# Patient Record
Sex: FEMALE | Race: WHITE | Employment: UNEMPLOYED | URBAN - METROPOLITAN AREA
[De-identification: names, ages, dates, MRNs, and addresses within clinical notes are randomized per-mention and may not be internally consistent; named-entity substitution may affect disease eponyms.]

---

## 2018-06-19 PROBLEM — I50.43 CHF (CONGESTIVE HEART FAILURE), NYHA CLASS I, ACUTE ON CHRONIC, COMBINED (HCC): Status: ACTIVE | Noted: 2018-06-19

## 2018-06-20 PROBLEM — I50.33 ACUTE ON CHRONIC DIASTOLIC CONGESTIVE HEART FAILURE (HCC): Status: ACTIVE | Noted: 2018-06-19

## 2018-08-29 ENCOUNTER — HOSPITAL ENCOUNTER (OUTPATIENT)
Age: 83
Discharge: HOME | End: 2018-08-29
Payer: MEDICARE

## 2018-08-29 LAB
BACTERIA: ABNORMAL /HPF
BILIRUBIN URINE: NEGATIVE
BLOOD, URINE: NEGATIVE
CLARITY: CLEAR
COLOR: YELLOW
EPITHELIAL CELLS, UA: ABNORMAL /HPF
GLUCOSE URINE: NEGATIVE MG/DL
KETONES, URINE: NEGATIVE MG/DL
LEUKOCYTE ESTERASE, URINE: NEGATIVE
MICROSCOPIC EXAMINATION: YES
NITRITE, URINE: POSITIVE
PH UA: 5.5
PROTEIN UA: NEGATIVE MG/DL
RBC UA: ABNORMAL /HPF (ref 0–2)
SPECIFIC GRAVITY UA: <=1.005
URINE TYPE: ABNORMAL
UROBILINOGEN, URINE: 0.2 E.U./DL
WBC UA: ABNORMAL /HPF (ref 0–5)

## 2018-08-29 PROCEDURE — 87186 SC STD MICRODIL/AGAR DIL: CPT

## 2018-08-29 PROCEDURE — 87077 CULTURE AEROBIC IDENTIFY: CPT

## 2018-08-29 PROCEDURE — 81001 URINALYSIS AUTO W/SCOPE: CPT

## 2018-08-29 PROCEDURE — 87086 URINE CULTURE/COLONY COUNT: CPT

## 2018-08-31 LAB
ORGANISM: ABNORMAL
URINE CULTURE, ROUTINE: ABNORMAL
URINE CULTURE, ROUTINE: ABNORMAL

## 2018-10-03 ENCOUNTER — HOSPITAL ENCOUNTER (OUTPATIENT)
Age: 83
Setting detail: SPECIMEN
Discharge: HOME | End: 2018-10-03
Payer: MEDICARE

## 2018-10-03 LAB
BILIRUBIN URINE: NEGATIVE
BLOOD, URINE: NEGATIVE
CLARITY: ABNORMAL
COLOR: YELLOW
GLUCOSE URINE: NEGATIVE MG/DL
KETONES, URINE: NEGATIVE MG/DL
LEUKOCYTE ESTERASE, URINE: NEGATIVE
MICROSCOPIC EXAMINATION: ABNORMAL
NITRITE, URINE: NEGATIVE
PH UA: 6.5
PROTEIN UA: NEGATIVE MG/DL
SPECIFIC GRAVITY UA: 1.01
URINE REFLEX TO CULTURE: ABNORMAL
URINE TYPE: ABNORMAL
UROBILINOGEN, URINE: 0.2 E.U./DL

## 2018-10-03 PROCEDURE — 81003 URINALYSIS AUTO W/O SCOPE: CPT

## 2018-12-06 ENCOUNTER — APPOINTMENT (OUTPATIENT)
Dept: GENERAL RADIOLOGY | Age: 83
End: 2018-12-06
Payer: MEDICARE

## 2018-12-06 ENCOUNTER — HOSPITAL ENCOUNTER (EMERGENCY)
Age: 83
Discharge: HOME | End: 2018-12-06
Attending: EMERGENCY MEDICINE
Payer: MEDICARE

## 2018-12-06 VITALS
OXYGEN SATURATION: 94 % | DIASTOLIC BLOOD PRESSURE: 65 MMHG | SYSTOLIC BLOOD PRESSURE: 148 MMHG | HEART RATE: 75 BPM | RESPIRATION RATE: 16 BRPM | TEMPERATURE: 98 F

## 2018-12-06 DIAGNOSIS — S22.060A CLOSED WEDGE COMPRESSION FRACTURE OF EIGHTH THORACIC VERTEBRA, INITIAL ENCOUNTER: ICD-10-CM

## 2018-12-06 DIAGNOSIS — M54.6 ACUTE BILATERAL THORACIC BACK PAIN: Primary | ICD-10-CM

## 2018-12-06 LAB
A/G RATIO: 1.3 (ref 1.1–2.2)
ALBUMIN SERPL-MCNC: 3.8 G/DL (ref 3.4–5)
ALP BLD-CCNC: 77 U/L (ref 40–129)
ALT SERPL-CCNC: 8 U/L (ref 10–40)
ANION GAP SERPL CALCULATED.3IONS-SCNC: 8 MMOL/L (ref 3–16)
AST SERPL-CCNC: 14 U/L (ref 15–37)
BILIRUB SERPL-MCNC: 0.5 MG/DL (ref 0–1)
BILIRUBIN URINE: NEGATIVE
BLOOD, URINE: NEGATIVE
BUN BLDV-MCNC: 21 MG/DL (ref 7–20)
CALCIUM SERPL-MCNC: 9.1 MG/DL (ref 8.3–10.6)
CHLORIDE BLD-SCNC: 91 MMOL/L (ref 99–110)
CLARITY: CLEAR
CO2: 35 MMOL/L (ref 21–32)
COLOR: YELLOW
CREAT SERPL-MCNC: 1.3 MG/DL (ref 0.6–1.2)
GFR AFRICAN AMERICAN: 46
GFR NON-AFRICAN AMERICAN: 38
GLOBULIN: 3 G/DL
GLUCOSE BLD-MCNC: 215 MG/DL (ref 70–99)
GLUCOSE URINE: NEGATIVE MG/DL
HCT VFR BLD CALC: 38.8 % (ref 36–48)
HEMOGLOBIN: 12.8 G/DL (ref 12–16)
KETONES, URINE: NEGATIVE MG/DL
LEUKOCYTE ESTERASE, URINE: NEGATIVE
LIPASE: 15 U/L (ref 13–60)
MCH RBC QN AUTO: 31.9 PG (ref 26–34)
MCHC RBC AUTO-ENTMCNC: 33.1 G/DL (ref 31–36)
MCV RBC AUTO: 96.4 FL (ref 80–100)
MICROSCOPIC EXAMINATION: NORMAL
NITRITE, URINE: NEGATIVE
PDW BLD-RTO: 14.3 % (ref 12.4–15.4)
PH UA: 7.5
PLATELET # BLD: 213 K/UL (ref 135–450)
PMV BLD AUTO: 7.3 FL (ref 5–10.5)
POTASSIUM SERPL-SCNC: 5.1 MMOL/L (ref 3.5–5.1)
PROTEIN UA: NEGATIVE MG/DL
RBC # BLD: 4.02 M/UL (ref 4–5.2)
SODIUM BLD-SCNC: 134 MMOL/L (ref 136–145)
SPECIFIC GRAVITY UA: 1.01
TOTAL PROTEIN: 6.8 G/DL (ref 6.4–8.2)
URINE TYPE: NORMAL
UROBILINOGEN, URINE: 0.2 E.U./DL
WBC # BLD: 7.9 K/UL (ref 4–11)

## 2018-12-06 PROCEDURE — 99283 EMERGENCY DEPT VISIT LOW MDM: CPT

## 2018-12-06 PROCEDURE — 72074 X-RAY EXAM THORAC SPINE4/>VW: CPT

## 2018-12-06 PROCEDURE — 80053 COMPREHEN METABOLIC PANEL: CPT

## 2018-12-06 PROCEDURE — 6370000000 HC RX 637 (ALT 250 FOR IP): Performed by: EMERGENCY MEDICINE

## 2018-12-06 PROCEDURE — 85027 COMPLETE CBC AUTOMATED: CPT

## 2018-12-06 PROCEDURE — 81003 URINALYSIS AUTO W/O SCOPE: CPT

## 2018-12-06 PROCEDURE — 72100 X-RAY EXAM L-S SPINE 2/3 VWS: CPT

## 2018-12-06 PROCEDURE — 72072 X-RAY EXAM THORAC SPINE 3VWS: CPT

## 2018-12-06 PROCEDURE — 83690 ASSAY OF LIPASE: CPT

## 2018-12-06 PROCEDURE — 71046 X-RAY EXAM CHEST 2 VIEWS: CPT

## 2018-12-06 RX ORDER — LIDOCAINE 4 G/G
1 PATCH TOPICAL ONCE
Status: DISCONTINUED | OUTPATIENT
Start: 2018-12-06 | End: 2018-12-06 | Stop reason: HOSPADM

## 2018-12-06 RX ORDER — LIDOCAINE 50 MG/G
2 PATCH TOPICAL DAILY
Qty: 30 PATCH | Refills: 0 | Status: SHIPPED | OUTPATIENT
Start: 2018-12-06

## 2018-12-06 RX ORDER — LIDOCAINE 50 MG/G
1 PATCH TOPICAL DAILY
Status: DISCONTINUED | OUTPATIENT
Start: 2018-12-06 | End: 2018-12-06 | Stop reason: CLARIF

## 2018-12-06 ASSESSMENT — PAIN SCALES - GENERAL: PAINLEVEL_OUTOF10: 10

## 2018-12-06 ASSESSMENT — PAIN DESCRIPTION - LOCATION: LOCATION: BACK;RIB CAGE

## 2018-12-06 ASSESSMENT — PAIN DESCRIPTION - PAIN TYPE: TYPE: ACUTE PAIN

## 2018-12-06 NOTE — ED NOTES
Medication reconciliation incomplete due to patient/family's inability to provide complete list.  Will need follow up.        Ross Daily, ENRIQUETA  12/06/18 6701

## 2018-12-07 ENCOUNTER — TELEPHONE (OUTPATIENT)
Dept: ORTHOPEDIC SURGERY | Age: 83
End: 2018-12-07

## 2018-12-07 NOTE — ED PROVIDER NOTES
CHIEF COMPLAINT  Back Pain (c/o back pain \"from waist up. \" and rib pain x 1 week. denies injury/trauma or falling or coughing. \"in my spine\")      HISTORY OF PRESENT ILLNESS  Ronan Vallejo is a 80 y.o. female with a history of diabetes mellitus, hypertension, and TIAs who presents to the ED complaining of back pain. Patient reports that she bent over to  a laundry basket within the last week and since that time his had severe lower back pain. Patient denies any radicular symptoms, saddle anesthesia, urinary incontinence. No blunt trauma noted. She further denies any abdominal pain. .   No other complaints, modifying factors or associated symptoms. I have reviewed the following from the nursing documentation. Past Medical History:   Diagnosis Date    Diabetes mellitus ([facility] [address]     Hypertension     TIA (transient ischemic attack)      Past Surgical History:   Procedure Laterality Date    CARDIAC SURGERY      CHOLECYSTECTOMY      CORONARY ARTERY BYPASS GRAFT      2 way     SPINE SURGERY      THYROIDECTOMY       History reviewed. No pertinent family history. Social History     Social History    Marital status:      Spouse name: N/A    Number of children: N/A    Years of education: N/A     Occupational History    Not on file. Social History Main Topics    Smoking status: Former Smoker    Smokeless tobacco: Not on file    Alcohol use No    Drug use: No    Sexual activity: Not on file     Other Topics Concern    Not on file     Social History Narrative    No narrative on file     No current facility-administered medications for this encounter. Current Outpatient Prescriptions   Medication Sig Dispense Refill    lidocaine (LIDODERM) 5 % Place 2 patches onto the skin daily 12 hours on, 12 hours off. 30 patch 0    gabapentin (NEURONTIN) 100 MG capsule Take 1 capsule by mouth 2 times daily for 30 days. . 60 capsule 0    lisinopril (PRINIVIL;ZESTRIL) 5 MG tablet Take 1

## 2018-12-11 ENCOUNTER — OFFICE VISIT (OUTPATIENT)
Dept: ORTHOPEDIC SURGERY | Age: 83
End: 2018-12-11
Payer: MEDICARE

## 2018-12-11 VITALS
DIASTOLIC BLOOD PRESSURE: 80 MMHG | SYSTOLIC BLOOD PRESSURE: 133 MMHG | HEART RATE: 78 BPM | WEIGHT: 152.78 LBS | BODY MASS INDEX: 28.84 KG/M2 | HEIGHT: 61 IN

## 2018-12-11 DIAGNOSIS — S22.000A CLOSED COMPRESSION FRACTURE OF THORACIC VERTEBRA, INITIAL ENCOUNTER (HCC): Primary | ICD-10-CM

## 2018-12-11 PROCEDURE — 1090F PRES/ABSN URINE INCON ASSESS: CPT | Performed by: PHYSICIAN ASSISTANT

## 2018-12-11 PROCEDURE — G8419 CALC BMI OUT NRM PARAM NOF/U: HCPCS | Performed by: PHYSICIAN ASSISTANT

## 2018-12-11 PROCEDURE — 99203 OFFICE O/P NEW LOW 30 MIN: CPT | Performed by: PHYSICIAN ASSISTANT

## 2018-12-11 PROCEDURE — G8427 DOCREV CUR MEDS BY ELIG CLIN: HCPCS | Performed by: PHYSICIAN ASSISTANT

## 2018-12-11 PROCEDURE — 1101F PT FALLS ASSESS-DOCD LE1/YR: CPT | Performed by: PHYSICIAN ASSISTANT

## 2018-12-11 PROCEDURE — G8484 FLU IMMUNIZE NO ADMIN: HCPCS | Performed by: PHYSICIAN ASSISTANT

## 2018-12-11 NOTE — PROGRESS NOTES
History of present illness:   Ms. Hunter Wei  is a pleasant 80 y.o. female with a PMH of TIA, HTN, and diabetes kindly referred by Dr. Ori Dang from [facility] ED for consultation regarding her thoracic back pain. She states her pain began insidiously about 1.5 weeks ago after doing laundry. Her pain has steadily persisted since then. She rates her back pain 10/10 and leg pain 0/10. She has a history of low back and intermittent right leg pain from lumbar stenosis, but notes her new pain is in her mid to lower thoracic spine and radiates into her lower rib cage. She describes the pain as sharp. She denies new lower extremity symptoms or bowel or bladder dysfunction. She states she can sit as long as she likes and stand for a maximum of 10 minutes. The pain moderately disrupts her sleep. She takes percocet. Past medical history:  Her past medical history has been reviewed and is significant for TIA, HTN, and diabetes. Her past surgical history has been reviewed and is non-contributory to her present illness. Her medications and allergies were reviewed. Her social history has been reviewed and she is a former smoker. Her family history has been reviewed is not pertinent to her current illness. Review of symptoms:  Patient's review of symptoms was reviewed and is significant for back pain and negative for recent weight loss, fatigue, chills, visual disturbances, blood in stool or urine, recent infection, chest pain, or shortness of breath. All other ROS were negative. Physical examination:  Ms. Deann Angulo's most recent vitals:  Vitals  BP: 133/80  Pulse: 78  Height: 5' 1.5\" (156.2 cm)  Weight: 152 lb 12.5 oz (69.3 kg)  Body mass index is 28.4 kg/m². General exam:  She is well-developed and well-nourished, is in obvious pain and alert and oriented to person, place, and time. She demonstrates appropriate mood and affect. Her skin is warm and dry.  Her gait is normal and she uses a
scar. She has 5/5 strength of EHLs, FHLs, foot evertors, ankle dorsiflexors, plantarflexors, quadriceps, hamstrings, iliopsoas, abductors and adductors about the hips bilaterally. She has a negative straight leg raise, bilaterally. Achilles and quadriceps reflexes are 1+. Sensation is intact to light touch L3 to S1 bilaterally. She has no clonus. Hip range of motion painless. Imaging:  I reviewed MRI images of her lumbar spine ***    Assessment:  ***    Plan:  We discussed treatment options including observation, additional oral steroids, physical therapy, epidural injection and microlumbar discectomy. She wishes to proceed with microdiscectomy. We discussed the risks, benefits, and alternatives to surgery including the risks of nerve or vessel injury, paralysis, spinal blood clot, spinal fluid leak, death, infection, need for additional surgery for recurrent herniation or low back pain, failure of surgery to alleviate her symptoms and worse symptoms following surgery. She understands these risks and wishes to proceed with surgery. Her questions were answered. She was instructed to call us emergently if she begins to experience bowel or bladder dysfunction, saddle anesthesia, increasing muscle weakness, or *** leg symptoms.

## 2018-12-28 ENCOUNTER — HOSPITAL ENCOUNTER (OUTPATIENT)
Age: 83
Setting detail: SPECIMEN
Discharge: HOME | End: 2018-12-28
Payer: MEDICARE

## 2018-12-28 LAB
AMORPHOUS: ABNORMAL /HPF
BACTERIA: ABNORMAL /HPF
BILIRUBIN URINE: NEGATIVE
BLOOD, URINE: NEGATIVE
CLARITY: ABNORMAL
COLOR: YELLOW
EPITHELIAL CELLS, UA: ABNORMAL /HPF
GLUCOSE URINE: NEGATIVE MG/DL
KETONES, URINE: NEGATIVE MG/DL
LEUKOCYTE ESTERASE, URINE: NEGATIVE
MICROSCOPIC EXAMINATION: YES
NITRITE, URINE: POSITIVE
PH UA: 8.5
PROTEIN UA: NEGATIVE MG/DL
RBC UA: ABNORMAL /HPF (ref 0–2)
SPECIFIC GRAVITY UA: 1.01
URINE TYPE: ABNORMAL
UROBILINOGEN, URINE: 0.2 E.U./DL
WBC UA: ABNORMAL /HPF (ref 0–5)

## 2018-12-28 PROCEDURE — 87186 SC STD MICRODIL/AGAR DIL: CPT

## 2018-12-28 PROCEDURE — 87077 CULTURE AEROBIC IDENTIFY: CPT

## 2018-12-28 PROCEDURE — 87086 URINE CULTURE/COLONY COUNT: CPT

## 2018-12-28 PROCEDURE — 81001 URINALYSIS AUTO W/SCOPE: CPT

## 2018-12-30 LAB
ORGANISM: ABNORMAL
URINE CULTURE, ROUTINE: ABNORMAL
URINE CULTURE, ROUTINE: ABNORMAL

## 2019-01-04 ENCOUNTER — APPOINTMENT (OUTPATIENT)
Dept: GENERAL RADIOLOGY | Age: 84
DRG: 291 | End: 2019-01-04
Payer: MEDICARE

## 2019-01-04 ENCOUNTER — HOSPITAL ENCOUNTER (INPATIENT)
Age: 84
LOS: 5 days | Discharge: NURSING, REHAB OR LONG-TERM CARE | DRG: 291 | End: 2019-01-09
Attending: EMERGENCY MEDICINE | Admitting: INTERNAL MEDICINE
Payer: MEDICARE

## 2019-01-04 DIAGNOSIS — J18.9 PNEUMONIA DUE TO ORGANISM: ICD-10-CM

## 2019-01-04 DIAGNOSIS — I50.9 ACUTE ON CHRONIC CONGESTIVE HEART FAILURE, UNSPECIFIED HEART FAILURE TYPE (HCC): Primary | ICD-10-CM

## 2019-01-04 DIAGNOSIS — G89.29 OTHER CHRONIC PAIN: ICD-10-CM

## 2019-01-04 PROBLEM — I10 BENIGN ESSENTIAL HTN: Status: ACTIVE | Noted: 2019-01-04

## 2019-01-04 PROBLEM — E11.9 DIABETES MELLITUS (HCC): Status: ACTIVE | Noted: 2019-01-04

## 2019-01-04 PROBLEM — I50.33 ACUTE ON CHRONIC DIASTOLIC CHF (CONGESTIVE HEART FAILURE) (HCC): Status: ACTIVE | Noted: 2019-01-04

## 2019-01-04 LAB
A/G RATIO: 1.1 (ref 1.1–2.2)
ALBUMIN SERPL-MCNC: 3.5 G/DL (ref 3.4–5)
ALP BLD-CCNC: 98 U/L (ref 40–129)
ALT SERPL-CCNC: 9 U/L (ref 10–40)
ANION GAP SERPL CALCULATED.3IONS-SCNC: 7 MMOL/L (ref 3–16)
AST SERPL-CCNC: 24 U/L (ref 15–37)
BASOPHILS ABSOLUTE: 0 K/UL (ref 0–0.2)
BASOPHILS RELATIVE PERCENT: 0.5 %
BILIRUB SERPL-MCNC: 0.3 MG/DL (ref 0–1)
BILIRUBIN URINE: NEGATIVE
BLOOD, URINE: NEGATIVE
BUN BLDV-MCNC: 19 MG/DL (ref 7–20)
CALCIUM SERPL-MCNC: 9.1 MG/DL (ref 8.3–10.6)
CHLORIDE BLD-SCNC: 91 MMOL/L (ref 99–110)
CLARITY: CLEAR
CO2: 35 MMOL/L (ref 21–32)
COLOR: YELLOW
CREAT SERPL-MCNC: 1.4 MG/DL (ref 0.6–1.2)
EKG ATRIAL RATE: 87 BPM
EKG DIAGNOSIS: NORMAL
EKG P AXIS: 40 DEGREES
EKG P-R INTERVAL: 242 MS
EKG Q-T INTERVAL: 416 MS
EKG QRS DURATION: 92 MS
EKG QTC CALCULATION (BAZETT): 500 MS
EKG R AXIS: 82 DEGREES
EKG T AXIS: 30 DEGREES
EKG VENTRICULAR RATE: 87 BPM
EOSINOPHILS ABSOLUTE: 0 K/UL (ref 0–0.6)
EOSINOPHILS RELATIVE PERCENT: 0.3 %
GFR AFRICAN AMERICAN: 43
GFR NON-AFRICAN AMERICAN: 35
GLOBULIN: 3.2 G/DL
GLUCOSE BLD-MCNC: 113 MG/DL (ref 70–99)
GLUCOSE BLD-MCNC: 170 MG/DL (ref 70–99)
GLUCOSE URINE: NEGATIVE MG/DL
HCT VFR BLD CALC: 37.9 % (ref 36–48)
HEMOGLOBIN: 12.7 G/DL (ref 12–16)
KETONES, URINE: NEGATIVE MG/DL
LEUKOCYTE ESTERASE, URINE: NEGATIVE
LYMPHOCYTES ABSOLUTE: 0.3 K/UL (ref 1–5.1)
LYMPHOCYTES RELATIVE PERCENT: 5.6 %
MCH RBC QN AUTO: 33 PG (ref 26–34)
MCHC RBC AUTO-ENTMCNC: 33.5 G/DL (ref 31–36)
MCV RBC AUTO: 98.3 FL (ref 80–100)
MICROSCOPIC EXAMINATION: ABNORMAL
MONOCYTES ABSOLUTE: 0.3 K/UL (ref 0–1.3)
MONOCYTES RELATIVE PERCENT: 6.3 %
NEUTROPHILS ABSOLUTE: 4.4 K/UL (ref 1.7–7.7)
NEUTROPHILS RELATIVE PERCENT: 87.3 %
NITRITE, URINE: NEGATIVE
PDW BLD-RTO: 14.9 % (ref 12.4–15.4)
PERFORMED ON: ABNORMAL
PH UA: 8.5
PLATELET # BLD: 225 K/UL (ref 135–450)
PMV BLD AUTO: 7.2 FL (ref 5–10.5)
POTASSIUM SERPL-SCNC: 4.9 MMOL/L (ref 3.5–5.1)
PRO-BNP: 2082 PG/ML (ref 0–449)
PROTEIN UA: NEGATIVE MG/DL
RBC # BLD: 3.86 M/UL (ref 4–5.2)
SODIUM BLD-SCNC: 133 MMOL/L (ref 136–145)
SPECIFIC GRAVITY UA: 1.01
TOTAL PROTEIN: 6.7 G/DL (ref 6.4–8.2)
TROPONIN: <0.01 NG/ML
TROPONIN: <0.01 NG/ML
URINE TYPE: ABNORMAL
UROBILINOGEN, URINE: 0.2 E.U./DL
WBC # BLD: 5 K/UL (ref 4–11)

## 2019-01-04 PROCEDURE — 2580000003 HC RX 258: Performed by: INTERNAL MEDICINE

## 2019-01-04 PROCEDURE — 83880 ASSAY OF NATRIURETIC PEPTIDE: CPT

## 2019-01-04 PROCEDURE — 80053 COMPREHEN METABOLIC PANEL: CPT

## 2019-01-04 PROCEDURE — 6360000002 HC RX W HCPCS: Performed by: EMERGENCY MEDICINE

## 2019-01-04 PROCEDURE — 93005 ELECTROCARDIOGRAM TRACING: CPT | Performed by: EMERGENCY MEDICINE

## 2019-01-04 PROCEDURE — 99285 EMERGENCY DEPT VISIT HI MDM: CPT

## 2019-01-04 PROCEDURE — 6370000000 HC RX 637 (ALT 250 FOR IP): Performed by: NURSE PRACTITIONER

## 2019-01-04 PROCEDURE — 84484 ASSAY OF TROPONIN QUANT: CPT

## 2019-01-04 PROCEDURE — 85025 COMPLETE CBC W/AUTO DIFF WBC: CPT

## 2019-01-04 PROCEDURE — 6360000002 HC RX W HCPCS: Performed by: INTERNAL MEDICINE

## 2019-01-04 PROCEDURE — 93010 ELECTROCARDIOGRAM REPORT: CPT | Performed by: INTERNAL MEDICINE

## 2019-01-04 PROCEDURE — 71046 X-RAY EXAM CHEST 2 VIEWS: CPT

## 2019-01-04 PROCEDURE — 81003 URINALYSIS AUTO W/O SCOPE: CPT

## 2019-01-04 PROCEDURE — 1200000000 HC SEMI PRIVATE

## 2019-01-04 PROCEDURE — 2580000003 HC RX 258: Performed by: EMERGENCY MEDICINE

## 2019-01-04 PROCEDURE — 6370000000 HC RX 637 (ALT 250 FOR IP): Performed by: INTERNAL MEDICINE

## 2019-01-04 PROCEDURE — 2700000000 HC OXYGEN THERAPY PER DAY

## 2019-01-04 PROCEDURE — 36415 COLL VENOUS BLD VENIPUNCTURE: CPT

## 2019-01-04 RX ORDER — OXYCODONE HYDROCHLORIDE AND ACETAMINOPHEN 5; 325 MG/1; MG/1
2 TABLET ORAL EVERY 4 HOURS PRN
Status: ON HOLD | COMMUNITY
End: 2019-01-09

## 2019-01-04 RX ORDER — ASPIRIN 325 MG
325 TABLET ORAL DAILY
Status: DISCONTINUED | OUTPATIENT
Start: 2019-01-05 | End: 2019-01-09 | Stop reason: HOSPADM

## 2019-01-04 RX ORDER — FUROSEMIDE 10 MG/ML
40 INJECTION INTRAMUSCULAR; INTRAVENOUS 2 TIMES DAILY
Status: DISCONTINUED | OUTPATIENT
Start: 2019-01-04 | End: 2019-01-06

## 2019-01-04 RX ORDER — ONDANSETRON 2 MG/ML
4 INJECTION INTRAMUSCULAR; INTRAVENOUS EVERY 6 HOURS PRN
Status: DISCONTINUED | OUTPATIENT
Start: 2019-01-04 | End: 2019-01-04

## 2019-01-04 RX ORDER — NICOTINE POLACRILEX 4 MG
15 LOZENGE BUCCAL PRN
Status: DISCONTINUED | OUTPATIENT
Start: 2019-01-04 | End: 2019-01-09 | Stop reason: HOSPADM

## 2019-01-04 RX ORDER — LEVOTHYROXINE SODIUM 0.1 MG/1
100 TABLET ORAL DAILY
Status: DISCONTINUED | OUTPATIENT
Start: 2019-01-04 | End: 2019-01-09 | Stop reason: HOSPADM

## 2019-01-04 RX ORDER — LISINOPRIL 5 MG/1
5 TABLET ORAL DAILY
Status: DISCONTINUED | OUTPATIENT
Start: 2019-01-05 | End: 2019-01-06

## 2019-01-04 RX ORDER — OXYCODONE HYDROCHLORIDE AND ACETAMINOPHEN 5; 325 MG/1; MG/1
2 TABLET ORAL EVERY 4 HOURS PRN
Status: DISCONTINUED | OUTPATIENT
Start: 2019-01-04 | End: 2019-01-09

## 2019-01-04 RX ORDER — DEXTROSE MONOHYDRATE 25 G/50ML
12.5 INJECTION, SOLUTION INTRAVENOUS PRN
Status: DISCONTINUED | OUTPATIENT
Start: 2019-01-04 | End: 2019-01-09 | Stop reason: HOSPADM

## 2019-01-04 RX ORDER — CITALOPRAM 20 MG/1
20 TABLET ORAL DAILY
Status: DISCONTINUED | OUTPATIENT
Start: 2019-01-05 | End: 2019-01-09 | Stop reason: HOSPADM

## 2019-01-04 RX ORDER — LIDOCAINE 4 G/G
1 PATCH TOPICAL DAILY
Status: DISCONTINUED | OUTPATIENT
Start: 2019-01-05 | End: 2019-01-09 | Stop reason: HOSPADM

## 2019-01-04 RX ORDER — SODIUM CHLORIDE 0.9 % (FLUSH) 0.9 %
10 SYRINGE (ML) INJECTION EVERY 12 HOURS SCHEDULED
Status: DISCONTINUED | OUTPATIENT
Start: 2019-01-04 | End: 2019-01-09 | Stop reason: HOSPADM

## 2019-01-04 RX ORDER — INSULIN GLARGINE 100 [IU]/ML
12 INJECTION, SOLUTION SUBCUTANEOUS NIGHTLY
Status: DISCONTINUED | OUTPATIENT
Start: 2019-01-04 | End: 2019-01-08

## 2019-01-04 RX ORDER — SODIUM CHLORIDE 9 MG/ML
INJECTION, SOLUTION INTRAVENOUS
Status: DISPENSED
Start: 2019-01-04 | End: 2019-01-05

## 2019-01-04 RX ORDER — SODIUM CHLORIDE 0.9 % (FLUSH) 0.9 %
10 SYRINGE (ML) INJECTION PRN
Status: DISCONTINUED | OUTPATIENT
Start: 2019-01-04 | End: 2019-01-09 | Stop reason: HOSPADM

## 2019-01-04 RX ORDER — GABAPENTIN 100 MG/1
100 CAPSULE ORAL 2 TIMES DAILY
Status: DISCONTINUED | OUTPATIENT
Start: 2019-01-04 | End: 2019-01-09 | Stop reason: HOSPADM

## 2019-01-04 RX ORDER — ATORVASTATIN CALCIUM 80 MG/1
80 TABLET, FILM COATED ORAL NIGHTLY
Status: DISCONTINUED | OUTPATIENT
Start: 2019-01-04 | End: 2019-01-09 | Stop reason: HOSPADM

## 2019-01-04 RX ORDER — TRAZODONE HYDROCHLORIDE 50 MG/1
150 TABLET ORAL NIGHTLY
Status: DISCONTINUED | OUTPATIENT
Start: 2019-01-04 | End: 2019-01-09 | Stop reason: HOSPADM

## 2019-01-04 RX ORDER — OXAZEPAM 15 MG/1
15 CAPSULE ORAL 3 TIMES DAILY PRN
Status: DISCONTINUED | OUTPATIENT
Start: 2019-01-04 | End: 2019-01-09 | Stop reason: HOSPADM

## 2019-01-04 RX ORDER — DEXTROSE MONOHYDRATE 50 MG/ML
100 INJECTION, SOLUTION INTRAVENOUS PRN
Status: DISCONTINUED | OUTPATIENT
Start: 2019-01-04 | End: 2019-01-09 | Stop reason: HOSPADM

## 2019-01-04 RX ORDER — OXAZEPAM 15 MG/1
CAPSULE ORAL
COMMUNITY
Start: 2019-01-03 | End: 2019-04-03

## 2019-01-04 RX ADMIN — FUROSEMIDE 40 MG: 10 INJECTION, SOLUTION INTRAMUSCULAR; INTRAVENOUS at 23:09

## 2019-01-04 RX ADMIN — TRAZODONE HYDROCHLORIDE 150 MG: 50 TABLET ORAL at 23:14

## 2019-01-04 RX ADMIN — INSULIN GLARGINE 12 UNITS: 100 INJECTION, SOLUTION SUBCUTANEOUS at 23:15

## 2019-01-04 RX ADMIN — OXYCODONE AND ACETAMINOPHEN 2 TABLET: 5; 325 TABLET ORAL at 23:13

## 2019-01-04 RX ADMIN — OXAZEPAM 15 MG: 15 CAPSULE ORAL at 23:12

## 2019-01-04 RX ADMIN — GABAPENTIN 100 MG: 100 CAPSULE ORAL at 23:13

## 2019-01-04 RX ADMIN — SODIUM CHLORIDE, PRESERVATIVE FREE 10 ML: 5 INJECTION INTRAVENOUS at 22:36

## 2019-01-04 RX ADMIN — ATORVASTATIN CALCIUM 80 MG: 80 TABLET, FILM COATED ORAL at 23:14

## 2019-01-04 RX ADMIN — CEFTRIAXONE SODIUM 1 G: 1 INJECTION, POWDER, FOR SOLUTION INTRAMUSCULAR; INTRAVENOUS at 22:36

## 2019-01-04 ASSESSMENT — PAIN DESCRIPTION - PAIN TYPE: TYPE: CHRONIC PAIN

## 2019-01-04 ASSESSMENT — ENCOUNTER SYMPTOMS
TROUBLE SWALLOWING: 0
COUGH: 1
SORE THROAT: 0
SHORTNESS OF BREATH: 1
RHINORRHEA: 0
DIARRHEA: 0
WHEEZING: 0
VOMITING: 0
CHEST TIGHTNESS: 0
ABDOMINAL PAIN: 0
STRIDOR: 0

## 2019-01-04 ASSESSMENT — PAIN DESCRIPTION - LOCATION: LOCATION: BACK;SHOULDER

## 2019-01-04 ASSESSMENT — PAIN SCALES - GENERAL: PAINLEVEL_OUTOF10: 9

## 2019-01-05 LAB
ANION GAP SERPL CALCULATED.3IONS-SCNC: 7 MMOL/L (ref 3–16)
BUN BLDV-MCNC: 20 MG/DL (ref 7–20)
CALCIUM SERPL-MCNC: 9 MG/DL (ref 8.3–10.6)
CHLORIDE BLD-SCNC: 93 MMOL/L (ref 99–110)
CHOLESTEROL, TOTAL: 120 MG/DL (ref 0–199)
CO2: 34 MMOL/L (ref 21–32)
CREAT SERPL-MCNC: 1.4 MG/DL (ref 0.6–1.2)
GFR AFRICAN AMERICAN: 43
GFR NON-AFRICAN AMERICAN: 35
GLUCOSE BLD-MCNC: 105 MG/DL (ref 70–99)
GLUCOSE BLD-MCNC: 153 MG/DL (ref 70–99)
GLUCOSE BLD-MCNC: 186 MG/DL (ref 70–99)
GLUCOSE BLD-MCNC: 226 MG/DL (ref 70–99)
GLUCOSE BLD-MCNC: 230 MG/DL (ref 70–99)
HDLC SERPL-MCNC: 52 MG/DL (ref 40–60)
LDL CHOLESTEROL CALCULATED: 54 MG/DL
MAGNESIUM: 2.5 MG/DL (ref 1.8–2.4)
PERFORMED ON: ABNORMAL
POTASSIUM SERPL-SCNC: 4.5 MMOL/L (ref 3.5–5.1)
SODIUM BLD-SCNC: 134 MMOL/L (ref 136–145)
TRIGL SERPL-MCNC: 68 MG/DL (ref 0–150)
TROPONIN: <0.01 NG/ML
VLDLC SERPL CALC-MCNC: 14 MG/DL

## 2019-01-05 PROCEDURE — 84484 ASSAY OF TROPONIN QUANT: CPT

## 2019-01-05 PROCEDURE — 94761 N-INVAS EAR/PLS OXIMETRY MLT: CPT

## 2019-01-05 PROCEDURE — 6370000000 HC RX 637 (ALT 250 FOR IP): Performed by: INTERNAL MEDICINE

## 2019-01-05 PROCEDURE — 6370000000 HC RX 637 (ALT 250 FOR IP): Performed by: NURSE PRACTITIONER

## 2019-01-05 PROCEDURE — 2580000003 HC RX 258: Performed by: INTERNAL MEDICINE

## 2019-01-05 PROCEDURE — 1200000000 HC SEMI PRIVATE

## 2019-01-05 PROCEDURE — 36415 COLL VENOUS BLD VENIPUNCTURE: CPT

## 2019-01-05 PROCEDURE — 83735 ASSAY OF MAGNESIUM: CPT

## 2019-01-05 PROCEDURE — 80061 LIPID PANEL: CPT

## 2019-01-05 PROCEDURE — 2700000000 HC OXYGEN THERAPY PER DAY

## 2019-01-05 PROCEDURE — 99223 1ST HOSP IP/OBS HIGH 75: CPT | Performed by: INTERNAL MEDICINE

## 2019-01-05 PROCEDURE — 80048 BASIC METABOLIC PNL TOTAL CA: CPT

## 2019-01-05 PROCEDURE — 6360000002 HC RX W HCPCS: Performed by: INTERNAL MEDICINE

## 2019-01-05 RX ORDER — METOLAZONE 2.5 MG/1
5 TABLET ORAL ONCE
Status: DISCONTINUED | OUTPATIENT
Start: 2019-01-05 | End: 2019-01-05

## 2019-01-05 RX ORDER — METOLAZONE 2.5 MG/1
5 TABLET ORAL ONCE
Status: COMPLETED | OUTPATIENT
Start: 2019-01-05 | End: 2019-01-05

## 2019-01-05 RX ADMIN — INSULIN GLARGINE 12 UNITS: 100 INJECTION, SOLUTION SUBCUTANEOUS at 22:01

## 2019-01-05 RX ADMIN — OXYCODONE AND ACETAMINOPHEN 2 TABLET: 5; 325 TABLET ORAL at 09:47

## 2019-01-05 RX ADMIN — TRAZODONE HYDROCHLORIDE 150 MG: 50 TABLET ORAL at 23:40

## 2019-01-05 RX ADMIN — OXYCODONE AND ACETAMINOPHEN 2 TABLET: 5; 325 TABLET ORAL at 14:20

## 2019-01-05 RX ADMIN — CITALOPRAM HYDROBROMIDE 20 MG: 20 TABLET ORAL at 07:59

## 2019-01-05 RX ADMIN — OXAZEPAM 15 MG: 15 CAPSULE ORAL at 17:11

## 2019-01-05 RX ADMIN — METOLAZONE 5 MG: 2.5 TABLET ORAL at 20:16

## 2019-01-05 RX ADMIN — SODIUM CHLORIDE, PRESERVATIVE FREE 10 ML: 5 INJECTION INTRAVENOUS at 08:03

## 2019-01-05 RX ADMIN — SODIUM CHLORIDE, PRESERVATIVE FREE 10 ML: 5 INJECTION INTRAVENOUS at 20:16

## 2019-01-05 RX ADMIN — LEVOTHYROXINE SODIUM 100 MCG: 100 TABLET ORAL at 07:59

## 2019-01-05 RX ADMIN — LISINOPRIL 5 MG: 5 TABLET ORAL at 07:59

## 2019-01-05 RX ADMIN — OXYCODONE AND ACETAMINOPHEN 2 TABLET: 5; 325 TABLET ORAL at 05:20

## 2019-01-05 RX ADMIN — GABAPENTIN 100 MG: 100 CAPSULE ORAL at 20:15

## 2019-01-05 RX ADMIN — FUROSEMIDE 40 MG: 10 INJECTION, SOLUTION INTRAMUSCULAR; INTRAVENOUS at 20:16

## 2019-01-05 RX ADMIN — OXYCODONE AND ACETAMINOPHEN 2 TABLET: 5; 325 TABLET ORAL at 20:15

## 2019-01-05 RX ADMIN — ASPIRIN 325 MG: 325 TABLET, COATED ORAL at 07:59

## 2019-01-05 RX ADMIN — ENOXAPARIN SODIUM 30 MG: 30 INJECTION SUBCUTANEOUS at 07:58

## 2019-01-05 RX ADMIN — OXAZEPAM 15 MG: 15 CAPSULE ORAL at 09:24

## 2019-01-05 RX ADMIN — ATORVASTATIN CALCIUM 80 MG: 80 TABLET, FILM COATED ORAL at 20:15

## 2019-01-05 RX ADMIN — FUROSEMIDE 40 MG: 10 INJECTION, SOLUTION INTRAMUSCULAR; INTRAVENOUS at 07:59

## 2019-01-05 RX ADMIN — INSULIN LISPRO 2 UNITS: 100 INJECTION, SOLUTION INTRAVENOUS; SUBCUTANEOUS at 22:01

## 2019-01-05 RX ADMIN — INSULIN LISPRO 2 UNITS: 100 INJECTION, SOLUTION INTRAVENOUS; SUBCUTANEOUS at 08:06

## 2019-01-05 RX ADMIN — GABAPENTIN 100 MG: 100 CAPSULE ORAL at 07:59

## 2019-01-05 RX ADMIN — INSULIN LISPRO 2 UNITS: 100 INJECTION, SOLUTION INTRAVENOUS; SUBCUTANEOUS at 13:18

## 2019-01-05 ASSESSMENT — PAIN SCALES - GENERAL
PAINLEVEL_OUTOF10: 8
PAINLEVEL_OUTOF10: 6
PAINLEVEL_OUTOF10: 9
PAINLEVEL_OUTOF10: 8
PAINLEVEL_OUTOF10: 9
PAINLEVEL_OUTOF10: 6
PAINLEVEL_OUTOF10: 2
PAINLEVEL_OUTOF10: 10

## 2019-01-06 ENCOUNTER — APPOINTMENT (OUTPATIENT)
Dept: ULTRASOUND IMAGING | Age: 84
DRG: 291 | End: 2019-01-06
Payer: MEDICARE

## 2019-01-06 LAB
ANION GAP SERPL CALCULATED.3IONS-SCNC: 8 MMOL/L (ref 3–16)
BILIRUBIN URINE: NEGATIVE
BLOOD, URINE: NEGATIVE
BUN BLDV-MCNC: 24 MG/DL (ref 7–20)
CALCIUM SERPL-MCNC: 9.1 MG/DL (ref 8.3–10.6)
CHLORIDE BLD-SCNC: 90 MMOL/L (ref 99–110)
CLARITY: CLEAR
CO2: 34 MMOL/L (ref 21–32)
COLOR: YELLOW
CREAT SERPL-MCNC: 1.7 MG/DL (ref 0.6–1.2)
GFR AFRICAN AMERICAN: 34
GFR NON-AFRICAN AMERICAN: 28
GLUCOSE BLD-MCNC: 100 MG/DL (ref 70–99)
GLUCOSE BLD-MCNC: 116 MG/DL (ref 70–99)
GLUCOSE BLD-MCNC: 132 MG/DL (ref 70–99)
GLUCOSE BLD-MCNC: 225 MG/DL (ref 70–99)
GLUCOSE BLD-MCNC: 248 MG/DL (ref 70–99)
GLUCOSE URINE: NEGATIVE MG/DL
KETONES, URINE: NEGATIVE MG/DL
LEUKOCYTE ESTERASE, URINE: NEGATIVE
MAGNESIUM: 2.3 MG/DL (ref 1.8–2.4)
MICROSCOPIC EXAMINATION: NORMAL
NITRITE, URINE: NEGATIVE
PERFORMED ON: ABNORMAL
PH UA: 7
POTASSIUM SERPL-SCNC: 4.5 MMOL/L (ref 3.5–5.1)
PRO-BNP: 1465 PG/ML (ref 0–449)
PROTEIN PROTEIN: 17 MG/DL
PROTEIN UA: NEGATIVE MG/DL
SODIUM BLD-SCNC: 132 MMOL/L (ref 136–145)
SODIUM URINE: 73 MMOL/L
SPECIFIC GRAVITY UA: 1.01
URINE TYPE: NORMAL
UROBILINOGEN, URINE: 0.2 E.U./DL

## 2019-01-06 PROCEDURE — 81003 URINALYSIS AUTO W/O SCOPE: CPT

## 2019-01-06 PROCEDURE — 83880 ASSAY OF NATRIURETIC PEPTIDE: CPT

## 2019-01-06 PROCEDURE — 6360000002 HC RX W HCPCS: Performed by: INTERNAL MEDICINE

## 2019-01-06 PROCEDURE — 80048 BASIC METABOLIC PNL TOTAL CA: CPT

## 2019-01-06 PROCEDURE — 2700000000 HC OXYGEN THERAPY PER DAY

## 2019-01-06 PROCEDURE — 2580000003 HC RX 258: Performed by: INTERNAL MEDICINE

## 2019-01-06 PROCEDURE — 83735 ASSAY OF MAGNESIUM: CPT

## 2019-01-06 PROCEDURE — 84300 ASSAY OF URINE SODIUM: CPT

## 2019-01-06 PROCEDURE — 6370000000 HC RX 637 (ALT 250 FOR IP): Performed by: INTERNAL MEDICINE

## 2019-01-06 PROCEDURE — 76770 US EXAM ABDO BACK WALL COMP: CPT

## 2019-01-06 PROCEDURE — 6370000000 HC RX 637 (ALT 250 FOR IP): Performed by: NURSE PRACTITIONER

## 2019-01-06 PROCEDURE — 36415 COLL VENOUS BLD VENIPUNCTURE: CPT

## 2019-01-06 PROCEDURE — 84156 ASSAY OF PROTEIN URINE: CPT

## 2019-01-06 PROCEDURE — 1200000000 HC SEMI PRIVATE

## 2019-01-06 PROCEDURE — 94761 N-INVAS EAR/PLS OXIMETRY MLT: CPT

## 2019-01-06 PROCEDURE — 99232 SBSQ HOSP IP/OBS MODERATE 35: CPT | Performed by: INTERNAL MEDICINE

## 2019-01-06 RX ORDER — HEPARIN SODIUM 5000 [USP'U]/ML
5000 INJECTION, SOLUTION INTRAVENOUS; SUBCUTANEOUS EVERY 8 HOURS SCHEDULED
Status: DISCONTINUED | OUTPATIENT
Start: 2019-01-06 | End: 2019-01-09 | Stop reason: HOSPADM

## 2019-01-06 RX ADMIN — OXAZEPAM 15 MG: 15 CAPSULE ORAL at 22:15

## 2019-01-06 RX ADMIN — TRAZODONE HYDROCHLORIDE 150 MG: 50 TABLET ORAL at 22:15

## 2019-01-06 RX ADMIN — HEPARIN SODIUM 5000 UNITS: 5000 INJECTION INTRAVENOUS; SUBCUTANEOUS at 21:18

## 2019-01-06 RX ADMIN — ATORVASTATIN CALCIUM 80 MG: 80 TABLET, FILM COATED ORAL at 21:17

## 2019-01-06 RX ADMIN — GABAPENTIN 100 MG: 100 CAPSULE ORAL at 21:17

## 2019-01-06 RX ADMIN — HEPARIN SODIUM 5000 UNITS: 5000 INJECTION INTRAVENOUS; SUBCUTANEOUS at 09:52

## 2019-01-06 RX ADMIN — OXAZEPAM 15 MG: 15 CAPSULE ORAL at 04:11

## 2019-01-06 RX ADMIN — ASPIRIN 325 MG: 325 TABLET, COATED ORAL at 09:43

## 2019-01-06 RX ADMIN — INSULIN LISPRO 4 UNITS: 100 INJECTION, SOLUTION INTRAVENOUS; SUBCUTANEOUS at 13:34

## 2019-01-06 RX ADMIN — OXYCODONE AND ACETAMINOPHEN 2 TABLET: 5; 325 TABLET ORAL at 09:59

## 2019-01-06 RX ADMIN — INSULIN GLARGINE 12 UNITS: 100 INJECTION, SOLUTION SUBCUTANEOUS at 21:18

## 2019-01-06 RX ADMIN — SODIUM CHLORIDE, PRESERVATIVE FREE 10 ML: 5 INJECTION INTRAVENOUS at 09:43

## 2019-01-06 RX ADMIN — INSULIN LISPRO 2 UNITS: 100 INJECTION, SOLUTION INTRAVENOUS; SUBCUTANEOUS at 21:18

## 2019-01-06 RX ADMIN — OXYCODONE AND ACETAMINOPHEN 2 TABLET: 5; 325 TABLET ORAL at 02:15

## 2019-01-06 RX ADMIN — OXYCODONE AND ACETAMINOPHEN 2 TABLET: 5; 325 TABLET ORAL at 16:24

## 2019-01-06 RX ADMIN — CITALOPRAM HYDROBROMIDE 20 MG: 20 TABLET ORAL at 09:43

## 2019-01-06 RX ADMIN — HEPARIN SODIUM 5000 UNITS: 5000 INJECTION INTRAVENOUS; SUBCUTANEOUS at 13:30

## 2019-01-06 RX ADMIN — FUROSEMIDE 40 MG: 10 INJECTION, SOLUTION INTRAMUSCULAR; INTRAVENOUS at 09:43

## 2019-01-06 RX ADMIN — LEVOTHYROXINE SODIUM 100 MCG: 100 TABLET ORAL at 09:43

## 2019-01-06 RX ADMIN — LISINOPRIL 5 MG: 5 TABLET ORAL at 09:43

## 2019-01-06 RX ADMIN — SODIUM CHLORIDE, PRESERVATIVE FREE 10 ML: 5 INJECTION INTRAVENOUS at 21:17

## 2019-01-06 RX ADMIN — OXAZEPAM 15 MG: 15 CAPSULE ORAL at 13:29

## 2019-01-06 RX ADMIN — GABAPENTIN 100 MG: 100 CAPSULE ORAL at 09:43

## 2019-01-06 ASSESSMENT — PAIN SCALES - GENERAL
PAINLEVEL_OUTOF10: 3
PAINLEVEL_OUTOF10: 6
PAINLEVEL_OUTOF10: 7
PAINLEVEL_OUTOF10: 8
PAINLEVEL_OUTOF10: 10

## 2019-01-06 ASSESSMENT — PAIN DESCRIPTION - PAIN TYPE: TYPE: CHRONIC PAIN

## 2019-01-06 ASSESSMENT — PAIN DESCRIPTION - LOCATION: LOCATION: BACK

## 2019-01-07 ENCOUNTER — APPOINTMENT (OUTPATIENT)
Dept: GENERAL RADIOLOGY | Age: 84
DRG: 291 | End: 2019-01-07
Payer: MEDICARE

## 2019-01-07 LAB
ALBUMIN SERPL-MCNC: 3.5 G/DL (ref 3.4–5)
ANION GAP SERPL CALCULATED.3IONS-SCNC: 10 MMOL/L (ref 3–16)
BUN BLDV-MCNC: 26 MG/DL (ref 7–20)
CALCIUM SERPL-MCNC: 9.3 MG/DL (ref 8.3–10.6)
CHLORIDE BLD-SCNC: 89 MMOL/L (ref 99–110)
CO2: 33 MMOL/L (ref 21–32)
CREAT SERPL-MCNC: 1.4 MG/DL (ref 0.6–1.2)
GFR AFRICAN AMERICAN: 43
GFR NON-AFRICAN AMERICAN: 35
GLUCOSE BLD-MCNC: 102 MG/DL (ref 70–99)
GLUCOSE BLD-MCNC: 146 MG/DL (ref 70–99)
GLUCOSE BLD-MCNC: 157 MG/DL (ref 70–99)
GLUCOSE BLD-MCNC: 56 MG/DL (ref 70–99)
GLUCOSE BLD-MCNC: 58 MG/DL (ref 70–99)
GLUCOSE BLD-MCNC: 94 MG/DL (ref 70–99)
GLUCOSE BLD-MCNC: 98 MG/DL (ref 70–99)
HCT VFR BLD CALC: 37.1 % (ref 36–48)
HEMOGLOBIN: 12.4 G/DL (ref 12–16)
MAGNESIUM: 2 MG/DL (ref 1.8–2.4)
MCH RBC QN AUTO: 32.3 PG (ref 26–34)
MCHC RBC AUTO-ENTMCNC: 33.3 G/DL (ref 31–36)
MCV RBC AUTO: 97 FL (ref 80–100)
PARATHYROID HORMONE INTACT: 40.9 PG/ML (ref 14–72)
PDW BLD-RTO: 14.7 % (ref 12.4–15.4)
PERFORMED ON: ABNORMAL
PERFORMED ON: NORMAL
PHOSPHORUS: 3.7 MG/DL (ref 2.5–4.9)
PLATELET # BLD: 225 K/UL (ref 135–450)
PMV BLD AUTO: 7 FL (ref 5–10.5)
POTASSIUM SERPL-SCNC: 4.5 MMOL/L (ref 3.5–5.1)
PROCALCITONIN: 0.19 NG/ML (ref 0–0.15)
RBC # BLD: 3.82 M/UL (ref 4–5.2)
SODIUM BLD-SCNC: 132 MMOL/L (ref 136–145)
TSH REFLEX: 2.14 UIU/ML (ref 0.27–4.2)
URIC ACID, SERUM: 7.1 MG/DL (ref 2.6–6)
VITAMIN D 25-HYDROXY: 52.8 NG/ML
WBC # BLD: 9.1 K/UL (ref 4–11)

## 2019-01-07 PROCEDURE — 97530 THERAPEUTIC ACTIVITIES: CPT

## 2019-01-07 PROCEDURE — 99232 SBSQ HOSP IP/OBS MODERATE 35: CPT | Performed by: NURSE PRACTITIONER

## 2019-01-07 PROCEDURE — 97116 GAIT TRAINING THERAPY: CPT

## 2019-01-07 PROCEDURE — 71046 X-RAY EXAM CHEST 2 VIEWS: CPT

## 2019-01-07 PROCEDURE — 6370000000 HC RX 637 (ALT 250 FOR IP): Performed by: INTERNAL MEDICINE

## 2019-01-07 PROCEDURE — 6370000000 HC RX 637 (ALT 250 FOR IP): Performed by: NURSE PRACTITIONER

## 2019-01-07 PROCEDURE — 6360000002 HC RX W HCPCS: Performed by: INTERNAL MEDICINE

## 2019-01-07 PROCEDURE — 84550 ASSAY OF BLOOD/URIC ACID: CPT

## 2019-01-07 PROCEDURE — 2580000003 HC RX 258: Performed by: INTERNAL MEDICINE

## 2019-01-07 PROCEDURE — 80069 RENAL FUNCTION PANEL: CPT

## 2019-01-07 PROCEDURE — 1200000000 HC SEMI PRIVATE

## 2019-01-07 PROCEDURE — 83970 ASSAY OF PARATHORMONE: CPT

## 2019-01-07 PROCEDURE — 83735 ASSAY OF MAGNESIUM: CPT

## 2019-01-07 PROCEDURE — 84443 ASSAY THYROID STIM HORMONE: CPT

## 2019-01-07 PROCEDURE — 82306 VITAMIN D 25 HYDROXY: CPT

## 2019-01-07 PROCEDURE — G8988 SELF CARE GOAL STATUS: HCPCS

## 2019-01-07 PROCEDURE — 97162 PT EVAL MOD COMPLEX 30 MIN: CPT

## 2019-01-07 PROCEDURE — G8987 SELF CARE CURRENT STATUS: HCPCS

## 2019-01-07 PROCEDURE — 85027 COMPLETE CBC AUTOMATED: CPT

## 2019-01-07 PROCEDURE — 2700000000 HC OXYGEN THERAPY PER DAY

## 2019-01-07 PROCEDURE — 36415 COLL VENOUS BLD VENIPUNCTURE: CPT

## 2019-01-07 PROCEDURE — 97166 OT EVAL MOD COMPLEX 45 MIN: CPT

## 2019-01-07 PROCEDURE — 84145 PROCALCITONIN (PCT): CPT

## 2019-01-07 RX ORDER — DOXYCYCLINE HYCLATE 100 MG
100 TABLET ORAL 2 TIMES DAILY WITH MEALS
Status: DISCONTINUED | OUTPATIENT
Start: 2019-01-07 | End: 2019-01-09 | Stop reason: HOSPADM

## 2019-01-07 RX ORDER — SODIUM CHLORIDE 9 MG/ML
INJECTION, SOLUTION INTRAVENOUS
Status: DISPENSED
Start: 2019-01-07 | End: 2019-01-08

## 2019-01-07 RX ORDER — FUROSEMIDE 10 MG/ML
40 INJECTION INTRAMUSCULAR; INTRAVENOUS ONCE
Status: COMPLETED | OUTPATIENT
Start: 2019-01-07 | End: 2019-01-07

## 2019-01-07 RX ADMIN — DOXYCYCLINE HYCLATE 100 MG: 100 TABLET, COATED ORAL at 17:59

## 2019-01-07 RX ADMIN — HEPARIN SODIUM 5000 UNITS: 5000 INJECTION INTRAVENOUS; SUBCUTANEOUS at 13:39

## 2019-01-07 RX ADMIN — SODIUM CHLORIDE, PRESERVATIVE FREE 10 ML: 5 INJECTION INTRAVENOUS at 09:31

## 2019-01-07 RX ADMIN — OXAZEPAM 15 MG: 15 CAPSULE ORAL at 16:06

## 2019-01-07 RX ADMIN — DEXTROSE 15 G: 15 GEL ORAL at 21:53

## 2019-01-07 RX ADMIN — GABAPENTIN 100 MG: 100 CAPSULE ORAL at 21:46

## 2019-01-07 RX ADMIN — TRAZODONE HYDROCHLORIDE 150 MG: 50 TABLET ORAL at 21:46

## 2019-01-07 RX ADMIN — OXYCODONE AND ACETAMINOPHEN 2 TABLET: 5; 325 TABLET ORAL at 13:40

## 2019-01-07 RX ADMIN — LEVOTHYROXINE SODIUM 100 MCG: 100 TABLET ORAL at 09:31

## 2019-01-07 RX ADMIN — OXYCODONE AND ACETAMINOPHEN 2 TABLET: 5; 325 TABLET ORAL at 21:46

## 2019-01-07 RX ADMIN — FUROSEMIDE 40 MG: 10 INJECTION, SOLUTION INTRAMUSCULAR; INTRAVENOUS at 14:14

## 2019-01-07 RX ADMIN — ATORVASTATIN CALCIUM 80 MG: 80 TABLET, FILM COATED ORAL at 21:47

## 2019-01-07 RX ADMIN — ASPIRIN 325 MG: 325 TABLET, COATED ORAL at 09:31

## 2019-01-07 RX ADMIN — SODIUM CHLORIDE, PRESERVATIVE FREE 10 ML: 5 INJECTION INTRAVENOUS at 23:31

## 2019-01-07 RX ADMIN — HEPARIN SODIUM 5000 UNITS: 5000 INJECTION INTRAVENOUS; SUBCUTANEOUS at 23:29

## 2019-01-07 RX ADMIN — CITALOPRAM HYDROBROMIDE 20 MG: 20 TABLET ORAL at 09:31

## 2019-01-07 RX ADMIN — INSULIN LISPRO 2 UNITS: 100 INJECTION, SOLUTION INTRAVENOUS; SUBCUTANEOUS at 16:07

## 2019-01-07 RX ADMIN — GABAPENTIN 100 MG: 100 CAPSULE ORAL at 09:31

## 2019-01-07 RX ADMIN — CEFTRIAXONE SODIUM 1 G: 1 INJECTION, POWDER, FOR SOLUTION INTRAMUSCULAR; INTRAVENOUS at 13:40

## 2019-01-07 RX ADMIN — HEPARIN SODIUM 5000 UNITS: 5000 INJECTION INTRAVENOUS; SUBCUTANEOUS at 05:32

## 2019-01-07 RX ADMIN — SODIUM CHLORIDE, PRESERVATIVE FREE 10 ML: 5 INJECTION INTRAVENOUS at 13:40

## 2019-01-07 RX ADMIN — INSULIN LISPRO 2 UNITS: 100 INJECTION, SOLUTION INTRAVENOUS; SUBCUTANEOUS at 18:02

## 2019-01-07 RX ADMIN — OXAZEPAM 15 MG: 15 CAPSULE ORAL at 07:10

## 2019-01-07 ASSESSMENT — ACTIVITIES OF DAILY LIVING (ADL): EFFECT OF PAIN ON DAILY ACTIVITIES: AMBULATING

## 2019-01-07 ASSESSMENT — PAIN SCALES - GENERAL
PAINLEVEL_OUTOF10: 10
PAINLEVEL_OUTOF10: 5
PAINLEVEL_OUTOF10: 0
PAINLEVEL_OUTOF10: 6
PAINLEVEL_OUTOF10: 8
PAINLEVEL_OUTOF10: 8

## 2019-01-07 ASSESSMENT — PAIN DESCRIPTION - DIRECTION: RADIATING_TOWARDS: LOWER MID BACK

## 2019-01-07 ASSESSMENT — PAIN DESCRIPTION - LOCATION
LOCATION: BACK

## 2019-01-07 ASSESSMENT — PAIN DESCRIPTION - ORIENTATION
ORIENTATION: MID

## 2019-01-07 ASSESSMENT — PAIN DESCRIPTION - PAIN TYPE
TYPE: ACUTE PAIN;CHRONIC PAIN
TYPE: CHRONIC PAIN
TYPE: ACUTE PAIN;CHRONIC PAIN
TYPE: ACUTE PAIN;CHRONIC PAIN

## 2019-01-07 ASSESSMENT — PAIN DESCRIPTION - DESCRIPTORS: DESCRIPTORS: ACHING

## 2019-01-07 ASSESSMENT — PAIN DESCRIPTION - PROGRESSION: CLINICAL_PROGRESSION: NOT CHANGED

## 2019-01-07 ASSESSMENT — PAIN DESCRIPTION - FREQUENCY: FREQUENCY: CONTINUOUS

## 2019-01-07 ASSESSMENT — PAIN DESCRIPTION - ONSET: ONSET: ON-GOING

## 2019-01-08 LAB
ALBUMIN SERPL-MCNC: 3.1 G/DL (ref 3.4–5)
ANION GAP SERPL CALCULATED.3IONS-SCNC: 9 MMOL/L (ref 3–16)
BUN BLDV-MCNC: 36 MG/DL (ref 7–20)
CALCIUM SERPL-MCNC: 8.8 MG/DL (ref 8.3–10.6)
CHLORIDE BLD-SCNC: 88 MMOL/L (ref 99–110)
CO2: 35 MMOL/L (ref 21–32)
CREAT SERPL-MCNC: 1.5 MG/DL (ref 0.6–1.2)
GFR AFRICAN AMERICAN: 39
GFR NON-AFRICAN AMERICAN: 33
GLUCOSE BLD-MCNC: 105 MG/DL (ref 70–99)
GLUCOSE BLD-MCNC: 136 MG/DL (ref 70–99)
GLUCOSE BLD-MCNC: 233 MG/DL (ref 70–99)
GLUCOSE BLD-MCNC: 316 MG/DL (ref 70–99)
GLUCOSE BLD-MCNC: 61 MG/DL (ref 70–99)
GLUCOSE BLD-MCNC: 86 MG/DL (ref 70–99)
GLUCOSE BLD-MCNC: 91 MG/DL (ref 70–99)
HCT VFR BLD CALC: 33.8 % (ref 36–48)
HEMOGLOBIN: 11.3 G/DL (ref 12–16)
MAGNESIUM: 2 MG/DL (ref 1.8–2.4)
MCH RBC QN AUTO: 32.7 PG (ref 26–34)
MCHC RBC AUTO-ENTMCNC: 33.3 G/DL (ref 31–36)
MCV RBC AUTO: 98.1 FL (ref 80–100)
PDW BLD-RTO: 14.9 % (ref 12.4–15.4)
PERFORMED ON: ABNORMAL
PERFORMED ON: NORMAL
PHOSPHORUS: 4.2 MG/DL (ref 2.5–4.9)
PLATELET # BLD: 215 K/UL (ref 135–450)
PMV BLD AUTO: 7.1 FL (ref 5–10.5)
POTASSIUM SERPL-SCNC: 4.7 MMOL/L (ref 3.5–5.1)
PRO-BNP: 1441 PG/ML (ref 0–449)
RBC # BLD: 3.45 M/UL (ref 4–5.2)
SODIUM BLD-SCNC: 132 MMOL/L (ref 136–145)
WBC # BLD: 8.8 K/UL (ref 4–11)

## 2019-01-08 PROCEDURE — 36415 COLL VENOUS BLD VENIPUNCTURE: CPT

## 2019-01-08 PROCEDURE — 2580000003 HC RX 258: Performed by: INTERNAL MEDICINE

## 2019-01-08 PROCEDURE — 6370000000 HC RX 637 (ALT 250 FOR IP): Performed by: NURSE PRACTITIONER

## 2019-01-08 PROCEDURE — 6370000000 HC RX 637 (ALT 250 FOR IP): Performed by: INTERNAL MEDICINE

## 2019-01-08 PROCEDURE — 83735 ASSAY OF MAGNESIUM: CPT

## 2019-01-08 PROCEDURE — 97116 GAIT TRAINING THERAPY: CPT

## 2019-01-08 PROCEDURE — 6360000002 HC RX W HCPCS: Performed by: INTERNAL MEDICINE

## 2019-01-08 PROCEDURE — 83880 ASSAY OF NATRIURETIC PEPTIDE: CPT

## 2019-01-08 PROCEDURE — 94761 N-INVAS EAR/PLS OXIMETRY MLT: CPT

## 2019-01-08 PROCEDURE — 99232 SBSQ HOSP IP/OBS MODERATE 35: CPT | Performed by: NURSE PRACTITIONER

## 2019-01-08 PROCEDURE — 1200000000 HC SEMI PRIVATE

## 2019-01-08 PROCEDURE — 97110 THERAPEUTIC EXERCISES: CPT

## 2019-01-08 PROCEDURE — 80069 RENAL FUNCTION PANEL: CPT

## 2019-01-08 PROCEDURE — 85027 COMPLETE CBC AUTOMATED: CPT

## 2019-01-08 PROCEDURE — 2700000000 HC OXYGEN THERAPY PER DAY

## 2019-01-08 RX ORDER — FUROSEMIDE 10 MG/ML
40 INJECTION INTRAMUSCULAR; INTRAVENOUS ONCE
Status: COMPLETED | OUTPATIENT
Start: 2019-01-08 | End: 2019-01-08

## 2019-01-08 RX ORDER — INSULIN GLARGINE 100 [IU]/ML
5 INJECTION, SOLUTION SUBCUTANEOUS NIGHTLY
Status: DISCONTINUED | OUTPATIENT
Start: 2019-01-08 | End: 2019-01-09 | Stop reason: HOSPADM

## 2019-01-08 RX ADMIN — INSULIN LISPRO 8 UNITS: 100 INJECTION, SOLUTION INTRAVENOUS; SUBCUTANEOUS at 12:56

## 2019-01-08 RX ADMIN — DOXYCYCLINE HYCLATE 100 MG: 100 TABLET, COATED ORAL at 18:04

## 2019-01-08 RX ADMIN — HEPARIN SODIUM 5000 UNITS: 5000 INJECTION INTRAVENOUS; SUBCUTANEOUS at 20:37

## 2019-01-08 RX ADMIN — FUROSEMIDE 40 MG: 10 INJECTION, SOLUTION INTRAMUSCULAR; INTRAVENOUS at 18:04

## 2019-01-08 RX ADMIN — OXYCODONE AND ACETAMINOPHEN 2 TABLET: 5; 325 TABLET ORAL at 15:08

## 2019-01-08 RX ADMIN — DOXYCYCLINE HYCLATE 100 MG: 100 TABLET, COATED ORAL at 08:39

## 2019-01-08 RX ADMIN — LEVOTHYROXINE SODIUM 100 MCG: 100 TABLET ORAL at 08:39

## 2019-01-08 RX ADMIN — CEFTRIAXONE SODIUM 1 G: 1 INJECTION, POWDER, FOR SOLUTION INTRAMUSCULAR; INTRAVENOUS at 12:56

## 2019-01-08 RX ADMIN — GABAPENTIN 100 MG: 100 CAPSULE ORAL at 08:39

## 2019-01-08 RX ADMIN — INSULIN GLARGINE 5 UNITS: 100 INJECTION, SOLUTION SUBCUTANEOUS at 20:37

## 2019-01-08 RX ADMIN — OXYCODONE AND ACETAMINOPHEN 2 TABLET: 5; 325 TABLET ORAL at 08:39

## 2019-01-08 RX ADMIN — GABAPENTIN 100 MG: 100 CAPSULE ORAL at 20:37

## 2019-01-08 RX ADMIN — ATORVASTATIN CALCIUM 80 MG: 80 TABLET, FILM COATED ORAL at 20:37

## 2019-01-08 RX ADMIN — INSULIN LISPRO 2 UNITS: 100 INJECTION, SOLUTION INTRAVENOUS; SUBCUTANEOUS at 20:39

## 2019-01-08 RX ADMIN — OXAZEPAM 15 MG: 15 CAPSULE ORAL at 15:45

## 2019-01-08 RX ADMIN — SODIUM CHLORIDE, PRESERVATIVE FREE 10 ML: 5 INJECTION INTRAVENOUS at 20:45

## 2019-01-08 RX ADMIN — HEPARIN SODIUM 5000 UNITS: 5000 INJECTION INTRAVENOUS; SUBCUTANEOUS at 15:08

## 2019-01-08 RX ADMIN — ASPIRIN 325 MG: 325 TABLET, COATED ORAL at 08:39

## 2019-01-08 RX ADMIN — SODIUM CHLORIDE, PRESERVATIVE FREE 10 ML: 5 INJECTION INTRAVENOUS at 08:40

## 2019-01-08 RX ADMIN — OXAZEPAM 15 MG: 15 CAPSULE ORAL at 09:27

## 2019-01-08 RX ADMIN — HEPARIN SODIUM 5000 UNITS: 5000 INJECTION INTRAVENOUS; SUBCUTANEOUS at 05:27

## 2019-01-08 RX ADMIN — CITALOPRAM HYDROBROMIDE 20 MG: 20 TABLET ORAL at 08:39

## 2019-01-08 RX ADMIN — OXAZEPAM 15 MG: 15 CAPSULE ORAL at 00:59

## 2019-01-08 RX ADMIN — TRAZODONE HYDROCHLORIDE 150 MG: 50 TABLET ORAL at 20:37

## 2019-01-08 ASSESSMENT — PAIN SCALES - GENERAL
PAINLEVEL_OUTOF10: 10
PAINLEVEL_OUTOF10: 10

## 2019-01-09 VITALS
WEIGHT: 146.3 LBS | RESPIRATION RATE: 16 BRPM | DIASTOLIC BLOOD PRESSURE: 74 MMHG | OXYGEN SATURATION: 97 % | SYSTOLIC BLOOD PRESSURE: 158 MMHG | HEIGHT: 61 IN | HEART RATE: 78 BPM | BODY MASS INDEX: 27.62 KG/M2 | TEMPERATURE: 98.3 F

## 2019-01-09 LAB
ALBUMIN SERPL-MCNC: 3.4 G/DL (ref 3.4–5)
ANION GAP SERPL CALCULATED.3IONS-SCNC: 10 MMOL/L (ref 3–16)
BUN BLDV-MCNC: 39 MG/DL (ref 7–20)
CALCIUM SERPL-MCNC: 9.2 MG/DL (ref 8.3–10.6)
CHLORIDE BLD-SCNC: 86 MMOL/L (ref 99–110)
CO2: 34 MMOL/L (ref 21–32)
CREAT SERPL-MCNC: 1.4 MG/DL (ref 0.6–1.2)
GFR AFRICAN AMERICAN: 43
GFR NON-AFRICAN AMERICAN: 35
GLUCOSE BLD-MCNC: 125 MG/DL (ref 70–99)
GLUCOSE BLD-MCNC: 210 MG/DL (ref 70–99)
MAGNESIUM: 1.9 MG/DL (ref 1.8–2.4)
PERFORMED ON: ABNORMAL
PHOSPHORUS: 3.4 MG/DL (ref 2.5–4.9)
POTASSIUM SERPL-SCNC: 4.9 MMOL/L (ref 3.5–5.1)
SODIUM BLD-SCNC: 130 MMOL/L (ref 136–145)

## 2019-01-09 PROCEDURE — 6370000000 HC RX 637 (ALT 250 FOR IP): Performed by: INTERNAL MEDICINE

## 2019-01-09 PROCEDURE — 2700000000 HC OXYGEN THERAPY PER DAY

## 2019-01-09 PROCEDURE — 36415 COLL VENOUS BLD VENIPUNCTURE: CPT

## 2019-01-09 PROCEDURE — 6370000000 HC RX 637 (ALT 250 FOR IP): Performed by: NURSE PRACTITIONER

## 2019-01-09 PROCEDURE — 83735 ASSAY OF MAGNESIUM: CPT

## 2019-01-09 PROCEDURE — 97110 THERAPEUTIC EXERCISES: CPT

## 2019-01-09 PROCEDURE — 94761 N-INVAS EAR/PLS OXIMETRY MLT: CPT

## 2019-01-09 PROCEDURE — 80069 RENAL FUNCTION PANEL: CPT

## 2019-01-09 PROCEDURE — 2580000003 HC RX 258: Performed by: INTERNAL MEDICINE

## 2019-01-09 PROCEDURE — 97116 GAIT TRAINING THERAPY: CPT

## 2019-01-09 PROCEDURE — 99232 SBSQ HOSP IP/OBS MODERATE 35: CPT | Performed by: NURSE PRACTITIONER

## 2019-01-09 PROCEDURE — 6360000002 HC RX W HCPCS: Performed by: INTERNAL MEDICINE

## 2019-01-09 RX ORDER — OXYCODONE HYDROCHLORIDE AND ACETAMINOPHEN 5; 325 MG/1; MG/1
2 TABLET ORAL EVERY 6 HOURS PRN
Status: DISCONTINUED | OUTPATIENT
Start: 2019-01-09 | End: 2019-01-09 | Stop reason: HOSPADM

## 2019-01-09 RX ORDER — OXYCODONE HYDROCHLORIDE AND ACETAMINOPHEN 5; 325 MG/1; MG/1
2 TABLET ORAL EVERY 6 HOURS PRN
Qty: 20 TABLET | Refills: 0 | Status: SHIPPED | OUTPATIENT
Start: 2019-01-09 | End: 2019-01-16

## 2019-01-09 RX ORDER — DOXYCYCLINE HYCLATE 100 MG
100 TABLET ORAL 2 TIMES DAILY WITH MEALS
Qty: 6 TABLET | Refills: 0
Start: 2019-01-09 | End: 2019-01-12

## 2019-01-09 RX ORDER — TORSEMIDE 20 MG/1
20 TABLET ORAL DAILY
Qty: 30 TABLET | Refills: 3 | Status: ON HOLD
Start: 2019-01-09 | End: 2019-06-24

## 2019-01-09 RX ADMIN — HEPARIN SODIUM 5000 UNITS: 5000 INJECTION INTRAVENOUS; SUBCUTANEOUS at 06:37

## 2019-01-09 RX ADMIN — SODIUM CHLORIDE, PRESERVATIVE FREE 10 ML: 5 INJECTION INTRAVENOUS at 08:44

## 2019-01-09 RX ADMIN — CITALOPRAM HYDROBROMIDE 20 MG: 20 TABLET ORAL at 08:43

## 2019-01-09 RX ADMIN — OXAZEPAM 15 MG: 15 CAPSULE ORAL at 11:03

## 2019-01-09 RX ADMIN — Medication 2 MG: at 01:54

## 2019-01-09 RX ADMIN — LEVOTHYROXINE SODIUM 100 MCG: 100 TABLET ORAL at 08:43

## 2019-01-09 RX ADMIN — DOXYCYCLINE HYCLATE 100 MG: 100 TABLET, COATED ORAL at 08:43

## 2019-01-09 RX ADMIN — OXYCODONE AND ACETAMINOPHEN 2 TABLET: 5; 325 TABLET ORAL at 06:36

## 2019-01-09 RX ADMIN — GABAPENTIN 100 MG: 100 CAPSULE ORAL at 08:43

## 2019-01-09 RX ADMIN — OXYCODONE AND ACETAMINOPHEN 2 TABLET: 5; 325 TABLET ORAL at 00:52

## 2019-01-09 RX ADMIN — OXYCODONE AND ACETAMINOPHEN 2 TABLET: 5; 325 TABLET ORAL at 15:09

## 2019-01-09 RX ADMIN — ASPIRIN 325 MG: 325 TABLET, COATED ORAL at 08:43

## 2019-01-09 ASSESSMENT — PAIN DESCRIPTION - PAIN TYPE
TYPE: CHRONIC PAIN

## 2019-01-09 ASSESSMENT — PAIN DESCRIPTION - LOCATION
LOCATION: BACK;SHOULDER

## 2019-01-09 ASSESSMENT — PAIN SCALES - GENERAL
PAINLEVEL_OUTOF10: 10
PAINLEVEL_OUTOF10: 8
PAINLEVEL_OUTOF10: 10
PAINLEVEL_OUTOF10: 10
PAINLEVEL_OUTOF10: 6

## 2019-01-09 ASSESSMENT — PAIN SCALES - WONG BAKER: WONGBAKER_NUMERICALRESPONSE: 8

## 2019-02-08 ENCOUNTER — HOSPITAL ENCOUNTER (OUTPATIENT)
Age: 84
Discharge: HOME | End: 2019-02-08
Payer: MEDICARE

## 2019-02-08 LAB
ANION GAP SERPL CALCULATED.3IONS-SCNC: 12 MMOL/L (ref 3–16)
BUN BLDV-MCNC: 16 MG/DL (ref 7–20)
CALCIUM SERPL-MCNC: 8.9 MG/DL (ref 8.3–10.6)
CHLORIDE BLD-SCNC: 94 MMOL/L (ref 99–110)
CO2: 32 MMOL/L (ref 21–32)
CREAT SERPL-MCNC: 1 MG/DL (ref 0.6–1.2)
GFR AFRICAN AMERICAN: >60
GFR NON-AFRICAN AMERICAN: 52
GLUCOSE BLD-MCNC: 144 MG/DL (ref 70–99)
POTASSIUM SERPL-SCNC: 4.1 MMOL/L (ref 3.5–5.1)
SODIUM BLD-SCNC: 138 MMOL/L (ref 136–145)

## 2019-02-08 PROCEDURE — 80048 BASIC METABOLIC PNL TOTAL CA: CPT

## 2019-02-08 PROCEDURE — 36415 COLL VENOUS BLD VENIPUNCTURE: CPT

## 2019-02-21 ENCOUNTER — HOSPITAL ENCOUNTER (OUTPATIENT)
Age: 84
Setting detail: SPECIMEN
Discharge: HOME | End: 2019-02-21
Payer: MEDICARE

## 2019-02-21 LAB
ANION GAP SERPL CALCULATED.3IONS-SCNC: 18 MMOL/L (ref 3–16)
BUN BLDV-MCNC: 17 MG/DL (ref 7–20)
CALCIUM SERPL-MCNC: 9.2 MG/DL (ref 8.3–10.6)
CHLORIDE BLD-SCNC: 92 MMOL/L (ref 99–110)
CO2: 35 MMOL/L (ref 21–32)
CREAT SERPL-MCNC: 1.1 MG/DL (ref 0.6–1.2)
GFR AFRICAN AMERICAN: 56
GFR NON-AFRICAN AMERICAN: 47
GLUCOSE BLD-MCNC: 155 MG/DL (ref 70–99)
POTASSIUM SERPL-SCNC: 3.6 MMOL/L (ref 3.5–5.1)
SODIUM BLD-SCNC: 145 MMOL/L (ref 136–145)

## 2019-02-21 PROCEDURE — 36415 COLL VENOUS BLD VENIPUNCTURE: CPT

## 2019-02-21 PROCEDURE — 80048 BASIC METABOLIC PNL TOTAL CA: CPT

## 2019-04-17 ENCOUNTER — APPOINTMENT (OUTPATIENT)
Dept: GENERAL RADIOLOGY | Age: 84
DRG: 291 | End: 2019-04-17
Payer: MEDICARE

## 2019-04-17 ENCOUNTER — HOSPITAL ENCOUNTER (INPATIENT)
Age: 84
LOS: 8 days | Discharge: NURSING, REHAB OR LONG-TERM CARE | DRG: 291 | End: 2019-04-25
Attending: EMERGENCY MEDICINE | Admitting: INTERNAL MEDICINE
Payer: MEDICARE
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
## 2019-04-17 DIAGNOSIS — S42.495A OTHER CLOSED NONDISPLACED FRACTURE OF DISTAL END OF LEFT HUMERUS, INITIAL ENCOUNTER: Primary | ICD-10-CM

## 2019-04-17 DIAGNOSIS — I50.9 ACUTE ON CHRONIC CONGESTIVE HEART FAILURE, UNSPECIFIED HEART FAILURE TYPE (HCC): ICD-10-CM

## 2019-04-17 PROBLEM — S42.302A CLOSED FRACTURE OF LEFT HUMERUS: Status: ACTIVE | Noted: 2019-04-17

## 2019-04-17 PROBLEM — I50.40 CHF (CONGESTIVE HEART FAILURE), NYHA CLASS III, UNSPECIFIED FAILURE CHRONICITY, COMBINED (HCC): Status: ACTIVE | Noted: 2019-04-17

## 2019-04-17 PROBLEM — I50.40 CHF (CONGESTIVE HEART FAILURE), NYHA CLASS III, UNSPECIFIED FAILURE CHRONICITY, COMBINED (HCC): Status: RESOLVED | Noted: 2019-04-17 | Resolved: 2019-04-17

## 2019-04-17 LAB
A/G RATIO: 1.3 (ref 1.1–2.2)
ALBUMIN SERPL-MCNC: 3.6 G/DL (ref 3.4–5)
ALP BLD-CCNC: 75 U/L (ref 40–129)
ALT SERPL-CCNC: 7 U/L (ref 10–40)
ANION GAP SERPL CALCULATED.3IONS-SCNC: 10 MMOL/L (ref 3–16)
AST SERPL-CCNC: 12 U/L (ref 15–37)
BASOPHILS ABSOLUTE: 0 K/UL (ref 0–0.2)
BASOPHILS RELATIVE PERCENT: 0.4 %
BILIRUB SERPL-MCNC: 0.5 MG/DL (ref 0–1)
BUN BLDV-MCNC: 17 MG/DL (ref 7–20)
CALCIUM SERPL-MCNC: 8.6 MG/DL (ref 8.3–10.6)
CHLORIDE BLD-SCNC: 97 MMOL/L (ref 99–110)
CO2: 31 MMOL/L (ref 21–32)
CREAT SERPL-MCNC: 1 MG/DL (ref 0.6–1.2)
EKG ATRIAL RATE: 83 BPM
EKG DIAGNOSIS: NORMAL
EKG P AXIS: 45 DEGREES
EKG P-R INTERVAL: 194 MS
EKG Q-T INTERVAL: 428 MS
EKG QRS DURATION: 96 MS
EKG QTC CALCULATION (BAZETT): 502 MS
EKG R AXIS: 85 DEGREES
EKG T AXIS: -4 DEGREES
EKG VENTRICULAR RATE: 83 BPM
EOSINOPHILS ABSOLUTE: 0 K/UL (ref 0–0.6)
EOSINOPHILS RELATIVE PERCENT: 0 %
GFR AFRICAN AMERICAN: >60
GFR NON-AFRICAN AMERICAN: 52
GLOBULIN: 2.7 G/DL
GLUCOSE BLD-MCNC: 182 MG/DL (ref 70–99)
GLUCOSE BLD-MCNC: 233 MG/DL (ref 70–99)
GLUCOSE BLD-MCNC: 241 MG/DL (ref 70–99)
GLUCOSE BLD-MCNC: 279 MG/DL (ref 70–99)
HCT VFR BLD CALC: 38.8 % (ref 36–48)
HEMOGLOBIN: 12.9 G/DL (ref 12–16)
LV EF: 55 %
LVEF MODALITY: NORMAL
LYMPHOCYTES ABSOLUTE: 0.5 K/UL (ref 1–5.1)
LYMPHOCYTES RELATIVE PERCENT: 8.4 %
MCH RBC QN AUTO: 31.8 PG (ref 26–34)
MCHC RBC AUTO-ENTMCNC: 33.4 G/DL (ref 31–36)
MCV RBC AUTO: 95.4 FL (ref 80–100)
MONOCYTES ABSOLUTE: 0.3 K/UL (ref 0–1.3)
MONOCYTES RELATIVE PERCENT: 5 %
NEUTROPHILS ABSOLUTE: 5.3 K/UL (ref 1.7–7.7)
NEUTROPHILS RELATIVE PERCENT: 86.2 %
PDW BLD-RTO: 13.5 % (ref 12.4–15.4)
PERFORMED ON: ABNORMAL
PLATELET # BLD: 226 K/UL (ref 135–450)
PMV BLD AUTO: 7.3 FL (ref 5–10.5)
POTASSIUM REFLEX MAGNESIUM: 3.9 MMOL/L (ref 3.5–5.1)
PRO-BNP: 2248 PG/ML (ref 0–449)
RBC # BLD: 4.07 M/UL (ref 4–5.2)
SODIUM BLD-SCNC: 138 MMOL/L (ref 136–145)
TOTAL PROTEIN: 6.3 G/DL (ref 6.4–8.2)
TROPONIN: <0.01 NG/ML
VITAMIN D 25-HYDROXY: 47.5 NG/ML
WBC # BLD: 6.2 K/UL (ref 4–11)

## 2019-04-17 PROCEDURE — 97535 SELF CARE MNGMENT TRAINING: CPT

## 2019-04-17 PROCEDURE — 93005 ELECTROCARDIOGRAM TRACING: CPT | Performed by: EMERGENCY MEDICINE

## 2019-04-17 PROCEDURE — 2580000003 HC RX 258: Performed by: INTERNAL MEDICINE

## 2019-04-17 PROCEDURE — 97162 PT EVAL MOD COMPLEX 30 MIN: CPT

## 2019-04-17 PROCEDURE — 6370000000 HC RX 637 (ALT 250 FOR IP): Performed by: EMERGENCY MEDICINE

## 2019-04-17 PROCEDURE — 6360000002 HC RX W HCPCS: Performed by: INTERNAL MEDICINE

## 2019-04-17 PROCEDURE — 2700000000 HC OXYGEN THERAPY PER DAY

## 2019-04-17 PROCEDURE — 97167 OT EVAL HIGH COMPLEX 60 MIN: CPT

## 2019-04-17 PROCEDURE — 6370000000 HC RX 637 (ALT 250 FOR IP): Performed by: INTERNAL MEDICINE

## 2019-04-17 PROCEDURE — 96375 TX/PRO/DX INJ NEW DRUG ADDON: CPT

## 2019-04-17 PROCEDURE — 82306 VITAMIN D 25 HYDROXY: CPT

## 2019-04-17 PROCEDURE — 6360000002 HC RX W HCPCS: Performed by: EMERGENCY MEDICINE

## 2019-04-17 PROCEDURE — 1200000000 HC SEMI PRIVATE

## 2019-04-17 PROCEDURE — 84484 ASSAY OF TROPONIN QUANT: CPT

## 2019-04-17 PROCEDURE — 71045 X-RAY EXAM CHEST 1 VIEW: CPT

## 2019-04-17 PROCEDURE — 85025 COMPLETE CBC W/AUTO DIFF WBC: CPT

## 2019-04-17 PROCEDURE — 94761 N-INVAS EAR/PLS OXIMETRY MLT: CPT

## 2019-04-17 PROCEDURE — 83036 HEMOGLOBIN GLYCOSYLATED A1C: CPT

## 2019-04-17 PROCEDURE — 96374 THER/PROPH/DIAG INJ IV PUSH: CPT

## 2019-04-17 PROCEDURE — 4500000024 HC ED LEVEL 4 PROCEDURE

## 2019-04-17 PROCEDURE — 36415 COLL VENOUS BLD VENIPUNCTURE: CPT

## 2019-04-17 PROCEDURE — 83880 ASSAY OF NATRIURETIC PEPTIDE: CPT

## 2019-04-17 PROCEDURE — 96372 THER/PROPH/DIAG INJ SC/IM: CPT

## 2019-04-17 PROCEDURE — 73060 X-RAY EXAM OF HUMERUS: CPT

## 2019-04-17 PROCEDURE — APPNB30 APP NON BILLABLE TIME 0-30 MINS: Performed by: PHYSICIAN ASSISTANT

## 2019-04-17 PROCEDURE — 93010 ELECTROCARDIOGRAM REPORT: CPT | Performed by: INTERNAL MEDICINE

## 2019-04-17 PROCEDURE — 97530 THERAPEUTIC ACTIVITIES: CPT

## 2019-04-17 PROCEDURE — 99284 EMERGENCY DEPT VISIT MOD MDM: CPT

## 2019-04-17 PROCEDURE — 94640 AIRWAY INHALATION TREATMENT: CPT

## 2019-04-17 PROCEDURE — 80053 COMPREHEN METABOLIC PANEL: CPT

## 2019-04-17 PROCEDURE — 93306 TTE W/DOPPLER COMPLETE: CPT

## 2019-04-17 RX ORDER — FUROSEMIDE 10 MG/ML
40 INJECTION INTRAMUSCULAR; INTRAVENOUS 2 TIMES DAILY
Status: DISCONTINUED | OUTPATIENT
Start: 2019-04-17 | End: 2019-04-19

## 2019-04-17 RX ORDER — FERROUS SULFATE 325(65) MG
325 TABLET ORAL 2 TIMES DAILY WITH MEALS
Status: DISCONTINUED | OUTPATIENT
Start: 2019-04-17 | End: 2019-04-25 | Stop reason: HOSPADM

## 2019-04-17 RX ORDER — ONDANSETRON 2 MG/ML
4 INJECTION INTRAMUSCULAR; INTRAVENOUS EVERY 6 HOURS PRN
Status: DISCONTINUED | OUTPATIENT
Start: 2019-04-17 | End: 2019-04-19

## 2019-04-17 RX ORDER — PANTOPRAZOLE SODIUM 40 MG/1
40 TABLET, DELAYED RELEASE ORAL 2 TIMES DAILY
Status: DISCONTINUED | OUTPATIENT
Start: 2019-04-17 | End: 2019-04-25 | Stop reason: HOSPADM

## 2019-04-17 RX ORDER — INSULIN GLARGINE 100 [IU]/ML
12 INJECTION, SOLUTION SUBCUTANEOUS NIGHTLY
Status: DISCONTINUED | OUTPATIENT
Start: 2019-04-17 | End: 2019-04-25 | Stop reason: HOSPADM

## 2019-04-17 RX ORDER — GABAPENTIN 100 MG/1
100 CAPSULE ORAL 2 TIMES DAILY
Status: DISCONTINUED | OUTPATIENT
Start: 2019-04-17 | End: 2019-04-25 | Stop reason: HOSPADM

## 2019-04-17 RX ORDER — SODIUM CHLORIDE 0.9 % (FLUSH) 0.9 %
10 SYRINGE (ML) INJECTION PRN
Status: DISCONTINUED | OUTPATIENT
Start: 2019-04-17 | End: 2019-04-25 | Stop reason: HOSPADM

## 2019-04-17 RX ORDER — FUROSEMIDE 10 MG/ML
20 INJECTION INTRAMUSCULAR; INTRAVENOUS ONCE
Status: DISCONTINUED | OUTPATIENT
Start: 2019-04-17 | End: 2019-04-22

## 2019-04-17 RX ORDER — OXYCODONE HYDROCHLORIDE 5 MG/1
5 TABLET ORAL ONCE
Status: COMPLETED | OUTPATIENT
Start: 2019-04-17 | End: 2019-04-17

## 2019-04-17 RX ORDER — OXYCODONE HYDROCHLORIDE AND ACETAMINOPHEN 5; 325 MG/1; MG/1
1 TABLET ORAL EVERY 4 HOURS PRN
Status: DISCONTINUED | OUTPATIENT
Start: 2019-04-17 | End: 2019-04-20

## 2019-04-17 RX ORDER — NITROGLYCERIN 0.4 MG/1
0.4 TABLET SUBLINGUAL EVERY 5 MIN PRN
Status: DISCONTINUED | OUTPATIENT
Start: 2019-04-17 | End: 2019-04-25 | Stop reason: HOSPADM

## 2019-04-17 RX ORDER — CITALOPRAM 20 MG/1
20 TABLET ORAL DAILY
Status: DISCONTINUED | OUTPATIENT
Start: 2019-04-17 | End: 2019-04-25 | Stop reason: HOSPADM

## 2019-04-17 RX ORDER — TRAZODONE HYDROCHLORIDE 50 MG/1
150 TABLET ORAL NIGHTLY
Status: DISCONTINUED | OUTPATIENT
Start: 2019-04-17 | End: 2019-04-25 | Stop reason: HOSPADM

## 2019-04-17 RX ORDER — CETIRIZINE HYDROCHLORIDE 5 MG/1
5 TABLET ORAL DAILY
Status: DISCONTINUED | OUTPATIENT
Start: 2019-04-17 | End: 2019-04-25 | Stop reason: HOSPADM

## 2019-04-17 RX ORDER — MORPHINE SULFATE 4 MG/ML
4 INJECTION, SOLUTION INTRAMUSCULAR; INTRAVENOUS ONCE
Status: COMPLETED | OUTPATIENT
Start: 2019-04-17 | End: 2019-04-17

## 2019-04-17 RX ORDER — ASPIRIN 325 MG
325 TABLET ORAL DAILY
Status: DISCONTINUED | OUTPATIENT
Start: 2019-04-17 | End: 2019-04-25 | Stop reason: HOSPADM

## 2019-04-17 RX ORDER — NICOTINE POLACRILEX 4 MG
15 LOZENGE BUCCAL PRN
Status: DISCONTINUED | OUTPATIENT
Start: 2019-04-17 | End: 2019-04-25 | Stop reason: HOSPADM

## 2019-04-17 RX ORDER — SODIUM CHLORIDE 0.9 % (FLUSH) 0.9 %
10 SYRINGE (ML) INJECTION EVERY 12 HOURS SCHEDULED
Status: DISCONTINUED | OUTPATIENT
Start: 2019-04-17 | End: 2019-04-25 | Stop reason: HOSPADM

## 2019-04-17 RX ORDER — DEXTROSE MONOHYDRATE 25 G/50ML
12.5 INJECTION, SOLUTION INTRAVENOUS PRN
Status: DISCONTINUED | OUTPATIENT
Start: 2019-04-17 | End: 2019-04-25 | Stop reason: HOSPADM

## 2019-04-17 RX ORDER — LATANOPROST 50 UG/ML
1 SOLUTION/ DROPS OPHTHALMIC DAILY
Status: DISCONTINUED | OUTPATIENT
Start: 2019-04-17 | End: 2019-04-25 | Stop reason: HOSPADM

## 2019-04-17 RX ORDER — DORZOLAMIDE HCL 20 MG/ML
1 SOLUTION/ DROPS OPHTHALMIC 2 TIMES DAILY
Status: DISCONTINUED | OUTPATIENT
Start: 2019-04-17 | End: 2019-04-25 | Stop reason: HOSPADM

## 2019-04-17 RX ORDER — ACETAMINOPHEN AND CODEINE PHOSPHATE 300; 30 MG/1; MG/1
1 TABLET ORAL EVERY 4 HOURS PRN
Qty: 12 TABLET | Refills: 0 | Status: SHIPPED | OUTPATIENT
Start: 2019-04-17 | End: 2019-04-17

## 2019-04-17 RX ORDER — OXAZEPAM 15 MG/1
15 CAPSULE ORAL 3 TIMES DAILY PRN
Status: ON HOLD | COMMUNITY
End: 2019-06-26 | Stop reason: SDUPTHER

## 2019-04-17 RX ORDER — OXAZEPAM 15 MG/1
15 CAPSULE ORAL 3 TIMES DAILY
Status: DISCONTINUED | OUTPATIENT
Start: 2019-04-17 | End: 2019-04-25 | Stop reason: HOSPADM

## 2019-04-17 RX ORDER — LORAZEPAM 0.5 MG/1
0.5 TABLET ORAL 2 TIMES DAILY PRN
Status: DISCONTINUED | OUTPATIENT
Start: 2019-04-17 | End: 2019-04-22

## 2019-04-17 RX ORDER — DEXTROSE MONOHYDRATE 50 MG/ML
100 INJECTION, SOLUTION INTRAVENOUS PRN
Status: DISCONTINUED | OUTPATIENT
Start: 2019-04-17 | End: 2019-04-25 | Stop reason: HOSPADM

## 2019-04-17 RX ORDER — CALCITRIOL 0.25 UG/1
0.25 CAPSULE, LIQUID FILLED ORAL
Status: DISCONTINUED | OUTPATIENT
Start: 2019-04-19 | End: 2019-04-25 | Stop reason: HOSPADM

## 2019-04-17 RX ORDER — HYDROCODONE BITARTRATE AND ACETAMINOPHEN 5; 325 MG/1; MG/1
1 TABLET ORAL EVERY 6 HOURS PRN
Status: DISCONTINUED | OUTPATIENT
Start: 2019-04-17 | End: 2019-04-17

## 2019-04-17 RX ORDER — ATORVASTATIN CALCIUM 80 MG/1
80 TABLET, FILM COATED ORAL NIGHTLY
Status: DISCONTINUED | OUTPATIENT
Start: 2019-04-17 | End: 2019-04-25 | Stop reason: HOSPADM

## 2019-04-17 RX ORDER — LEVOTHYROXINE SODIUM 0.1 MG/1
100 TABLET ORAL DAILY
Status: DISCONTINUED | OUTPATIENT
Start: 2019-04-17 | End: 2019-04-25 | Stop reason: HOSPADM

## 2019-04-17 RX ORDER — OXYCODONE HYDROCHLORIDE AND ACETAMINOPHEN 5; 325 MG/1; MG/1
1 TABLET ORAL ONCE
Status: COMPLETED | OUTPATIENT
Start: 2019-04-17 | End: 2019-04-17

## 2019-04-17 RX ADMIN — PANTOPRAZOLE SODIUM 40 MG: 40 TABLET, DELAYED RELEASE ORAL at 22:07

## 2019-04-17 RX ADMIN — FERROUS SULFATE TAB 325 MG (65 MG ELEMENTAL FE) 325 MG: 325 (65 FE) TAB at 14:13

## 2019-04-17 RX ADMIN — Medication 10 ML: at 22:08

## 2019-04-17 RX ADMIN — OXYCODONE AND ACETAMINOPHEN 1 TABLET: 5; 325 TABLET ORAL at 14:12

## 2019-04-17 RX ADMIN — DORZOLAMIDE HYDROCHLORIDE 1 DROP: 20 SOLUTION/ DROPS OPHTHALMIC at 22:08

## 2019-04-17 RX ADMIN — ATORVASTATIN CALCIUM 80 MG: 80 TABLET, FILM COATED ORAL at 22:07

## 2019-04-17 RX ADMIN — CETIRIZINE HYDROCHLORIDE 5 MG: 5 TABLET ORAL at 14:12

## 2019-04-17 RX ADMIN — Medication 2 PUFF: at 20:17

## 2019-04-17 RX ADMIN — FUROSEMIDE 40 MG: 10 INJECTION, SOLUTION INTRAMUSCULAR; INTRAVENOUS at 11:58

## 2019-04-17 RX ADMIN — INSULIN LISPRO 2 UNITS: 100 INJECTION, SOLUTION INTRAVENOUS; SUBCUTANEOUS at 16:54

## 2019-04-17 RX ADMIN — ASPIRIN 325 MG: 325 TABLET, COATED ORAL at 11:38

## 2019-04-17 RX ADMIN — GABAPENTIN 100 MG: 100 CAPSULE ORAL at 11:38

## 2019-04-17 RX ADMIN — TRAZODONE HYDROCHLORIDE 150 MG: 50 TABLET ORAL at 22:07

## 2019-04-17 RX ADMIN — LATANOPROST 1 DROP: 50 SOLUTION OPHTHALMIC at 22:08

## 2019-04-17 RX ADMIN — GABAPENTIN 100 MG: 100 CAPSULE ORAL at 22:07

## 2019-04-17 RX ADMIN — CITALOPRAM HYDROBROMIDE 20 MG: 20 TABLET ORAL at 11:38

## 2019-04-17 RX ADMIN — HYDROMORPHONE HYDROCHLORIDE 0.5 MG: 1 INJECTION, SOLUTION INTRAMUSCULAR; INTRAVENOUS; SUBCUTANEOUS at 04:17

## 2019-04-17 RX ADMIN — OXYCODONE AND ACETAMINOPHEN 1 TABLET: 5; 325 TABLET ORAL at 22:58

## 2019-04-17 RX ADMIN — FUROSEMIDE 40 MG: 10 INJECTION, SOLUTION INTRAMUSCULAR; INTRAVENOUS at 22:08

## 2019-04-17 RX ADMIN — DORZOLAMIDE HYDROCHLORIDE 1 DROP: 20 SOLUTION/ DROPS OPHTHALMIC at 11:38

## 2019-04-17 RX ADMIN — OXYCODONE HYDROCHLORIDE 5 MG: 5 TABLET ORAL at 08:52

## 2019-04-17 RX ADMIN — PANTOPRAZOLE SODIUM 40 MG: 40 TABLET, DELAYED RELEASE ORAL at 11:38

## 2019-04-17 RX ADMIN — OXAZEPAM 15 MG: 15 CAPSULE ORAL at 22:07

## 2019-04-17 RX ADMIN — LEVOTHYROXINE SODIUM 100 MCG: 100 TABLET ORAL at 14:12

## 2019-04-17 RX ADMIN — OXYCODONE AND ACETAMINOPHEN 1 TABLET: 5; 325 TABLET ORAL at 18:49

## 2019-04-17 RX ADMIN — INSULIN LISPRO 2 UNITS: 100 INJECTION, SOLUTION INTRAVENOUS; SUBCUTANEOUS at 22:09

## 2019-04-17 RX ADMIN — OXYCODONE HYDROCHLORIDE AND ACETAMINOPHEN 1 TABLET: 5; 325 TABLET ORAL at 01:53

## 2019-04-17 RX ADMIN — MORPHINE SULFATE 4 MG: 4 INJECTION INTRAVENOUS at 03:43

## 2019-04-17 RX ADMIN — FERROUS SULFATE TAB 325 MG (65 MG ELEMENTAL FE) 325 MG: 325 (65 FE) TAB at 16:47

## 2019-04-17 RX ADMIN — INSULIN GLARGINE 12 UNITS: 100 INJECTION, SOLUTION SUBCUTANEOUS at 22:10

## 2019-04-17 RX ADMIN — Medication 10 ML: at 14:13

## 2019-04-17 ASSESSMENT — PAIN SCALES - GENERAL
PAINLEVEL_OUTOF10: 10
PAINLEVEL_OUTOF10: 10
PAINLEVEL_OUTOF10: 7
PAINLEVEL_OUTOF10: 6
PAINLEVEL_OUTOF10: 8
PAINLEVEL_OUTOF10: 9
PAINLEVEL_OUTOF10: 10
PAINLEVEL_OUTOF10: 10
PAINLEVEL_OUTOF10: 8
PAINLEVEL_OUTOF10: 10

## 2019-04-17 ASSESSMENT — PAIN DESCRIPTION - LOCATION
LOCATION: ARM

## 2019-04-17 ASSESSMENT — PAIN DESCRIPTION - ORIENTATION
ORIENTATION: LEFT
ORIENTATION: LEFT
ORIENTATION: RIGHT
ORIENTATION: LEFT
ORIENTATION: LEFT

## 2019-04-17 ASSESSMENT — PAIN DESCRIPTION - PAIN TYPE
TYPE: ACUTE PAIN
TYPE: ACUTE PAIN

## 2019-04-17 ASSESSMENT — PAIN DESCRIPTION - PROGRESSION: CLINICAL_PROGRESSION: GRADUALLY WORSENING

## 2019-04-17 ASSESSMENT — PAIN DESCRIPTION - ONSET: ONSET: ON-GOING

## 2019-04-17 NOTE — ED NOTES
Bedside report given to RN and then transferred via transport.      [facility]  04/17/19 4893

## 2019-04-17 NOTE — ED NOTES
Alert and oriented. Pt c/o pain in left arm from shoulder sown to wrist.  Pain much worse with movement. Left hand is warm to touch, and sensation intact in fingers.      Rohini Chaidez RN  04/17/19 8152

## 2019-04-17 NOTE — CARE COORDINATION
[facility]    Spoke with patient and son present regarding [facility]  Present in hospital and will continue to follow till discharge. Son did confirm request for rehab at [facility] when discharged. Electronically signed by Shreley Crockett LPN on 6/62/46 at 39:83 AM        [facility] Transition Nurse  [phone]

## 2019-04-17 NOTE — LETTER
· To find a different doctor, visit Medicare's Physician Compare website, HDTapes.co.nz, or call 1-800-MEDICARE (504 2359). TTY users should call 5-135.913.7035. · To find a different skilled nursing facility, visit [facility] Medico website, https://www.NeoChord/, or call 1-800-MEDICARE (1- 800.853.4802). TTY users should call 5-353.952.6009. · To find a different long term care hospital, visit [facility] 940 Compare website, Done In :60 Secondsellology.be, or call 1-800- MEDICARE (531 6146). TTY users should call 6-884.733.4563. · To find a different inpatient rehabilitation facility, visit [facility] E Compare website, www.medicare.gov/ inpatientrehabilitation facilitycompare, or call 1-800-MEDICARE (4-710.634.7610). TTY users should call 0- 368.921.4834. · To find a different home health agency, visit [address] website, www.medicare.gov/homehealthcompare, or call 1-800-MEDICARE (7-040- 276-4361). TTY users should call [phone].

## 2019-04-17 NOTE — PROGRESS NOTES
Pt's daughter brought pt's home medication Oxazepam 15 mg. She also gave the pt one 15 mg pill without notifying the RN. Home meds were sent to the pharmacy.

## 2019-04-17 NOTE — PROGRESS NOTES
Sit: Moderate assistance;2 Person assistance  Sit to Supine: Maximum assistance;2 Person assistance  Transfers  Stand Step Transfers: Contact guard assistance(HHA)  Sit to stand: Contact guard assistance  Stand to sit: Contact guard assistance     Cognition  Overall Cognitive Status: Exceptions  Following Commands:  Follows one step commands consistently  Attention Span: Attends with cues to redirect  Problem Solving: Assistance required to correct errors made;Assistance required to identify errors made     Sensation  Overall Sensation Status: (c/o numbness in L hand)      LUE AROM (degrees)  LUE General AROM: n/a  RUE AROM (degrees)  RUE AROM : WFL  RUE Strength  Gross RUE Strength: WFL     Hand Dominance  Hand Dominance: Right       Plan   Plan  Times per week: 3-5x    AM-PAC Score   AM-Located within [facility] Inpatient Daily Activity Raw Score: 12  AM-PAC Inpatient ADL T-Scale Score : 30.6  ADL Inpatient CMS 0-100% Score: 66.57  ADL Inpatient CMS G-Code Modifier : CL  Goals  Short term goals  Time Frame for Short term goals: for 1 week or otherwise specified  Short term goal 1: Perform UE dressing with min A by 4/23  Short term goal 2: Perform functional transfer to chair/commode with CGA by 4/19  Short term goal 3: Perform toileting with mod A  Patient Goals   Patient goals : Pt did not state     Therapy Time   Individual Concurrent Group Co-treatment   Time In 0740         Time Out 0800         Minutes 20         Timed Code Treatment Minutes: 10 Minutes(10 min eval )     Mony Vizcaino OTSERVANDO/L

## 2019-04-17 NOTE — CARE COORDINATION
CM spoke to [place] with [facility] and they can accept pt when medically stable for d/c and has met her three [place] stay. Writer updated pt and son Fredy Collins at bedside, both in agreement with d/c plan. Will follow.

## 2019-04-17 NOTE — PROGRESS NOTES
Patient admitted to room 218-2 from ED. Patient oriented to room, call light, bed rails, phone, lights and bathroom. Patient instructed about the schedule of the day including: vital sign frequency, lab draws, possible tests, frequency of MD and staff rounds, including RN/MD rounding together at bedside, daily weights, and I &O's. Patient instructed about prescribed diet, how to use 8MENU, and television. bed alarm in place, patient aware of placement and reason. Telemetry box 23 in place, patient aware of placement and reason. Bed locked, in lowest position, side rails up 2/4, call light within reach. Will continue to monitor.

## 2019-04-17 NOTE — PROGRESS NOTES
4 Eyes Skin Assessment     The patient is being assess for  Admission    I agree that 2 RN's have performed a thorough Head to Toe Skin Assessment on the patient. ALL assessment sites listed below have been assessed. Areas assessed by both nurses: Vivian and Niharika  [x]   Head, Face, and Ears   [x]   Shoulders, Back, and Chest  [x]   Arms, Elbows, and Hands   [x]   Coccyx, Sacrum, and Ischum  [x]   Legs, Feet, and Heels    Pt has scattered bruising , old scratches, nothing significant. Does the Patient have Skin Breakdown?   No         Guerrero Prevention initiated:  No   Wound Care Orders initiated:  No      [facility] nurse consulted for Pressure Injury (Stage 3,4, Unstageable, DTI, NWPT, and Complex wounds):  No      Nurse 1 eSignature: Electronically signed by Clemente Candelaria RN on 4/17/19 at 5:28 PM    **SHARE this note so that the co-signing nurse is able to place an eSignature**    Nurse 2 eSignature: Electronically signed by Cyril Avila RN on 4/21/19 at 6:51 AM

## 2019-04-17 NOTE — ED PROVIDER NOTES
[facility] Emergency Department    CHIEF COMPLAINT  Arm Pain (left arm pain from shoulder down, started 3 weeks ago, worse in elbow right now. .  saw Jerrell Pisano yesterday and goiven percocet for pain, last took at 1800)      Shruthi Melvin is a 80 y.o. female  who presents to the ED for evaluation of left arm pain and shortness of breath. Patient says that her left arm has been hurting her now for the past several weeks. She denies any recent trauma that she can think of. Pain is worse with movement. Additionally, patient says that she gets short of breath with exertion. She does have a history of congestive heart failure. She denies any chest pain. No other complaints, modifying factors or associated symptoms. I have reviewed the following from the nursing documentation. Past Medical History:   Diagnosis Date    Anxiety     CAD (coronary artery disease)     Cancer ([facility] [address]     breast    CHF (congestive heart failure) (HCC)     Depression     Diabetes mellitus ([facility] [address]     Glaucoma     Hyperlipidemia     Hypertension     Macular degeneration     Thyroid disease     TIA (transient ischemic attack)      Past Surgical History:   Procedure Laterality Date    CARDIAC SURGERY      CHOLECYSTECTOMY      CORONARY ARTERY BYPASS GRAFT      2 way     SPINE SURGERY      THYROIDECTOMY       History reviewed. No pertinent family history. Social History     Socioeconomic History    Marital status:       Spouse name: Not on file    Number of children: Not on file    Years of education: Not on file    Highest education level: Not on file   Occupational History    Not on file   Social Needs    Financial resource strain: Not on file    Food insecurity:     Worry: Not on file     Inability: Not on file    Transportation needs:     Medical: Not on file     Non-medical: Not on file   Tobacco Use    Smoking status: Former Smoker    Smokeless tobacco: Never Used   Substance and Sexual Activity    Alcohol use: No    Drug use: No    Sexual activity: Not on file   Lifestyle    Physical activity:     Days per week: Not on file     Minutes per session: Not on file    Stress: Not on file   Relationships    Social connections:     Talks on phone: Not on file     Gets together: Not on file     Attends Mormonism service: Not on file     Active member of club or organization: Not on file     Attends meetings of clubs or organizations: Not on file     Relationship status: Not on file    Intimate partner violence:     Fear of current or ex partner: Not on file     Emotionally abused: Not on file     Physically abused: Not on file     Forced sexual activity: Not on file   Other Topics Concern    Not on file   Social History Narrative    Not on file     Current Facility-Administered Medications   Medication Dose Route Frequency Provider Last Rate Last Dose    furosemide (LASIX) injection 20 mg  20 mg Intravenous Once Anurag Primer, DO        oxyCODONE (ROXICODONE) immediate release tablet 5 mg  5 mg Oral Once Anurag Primer, DO         Current Outpatient Medications   Medication Sig Dispense Refill    insulin lispro (HUMALOG) 100 UNIT/ML injection vial Inject 6 Units into the skin 2 times daily      oxazepam (SERAX) 15 MG capsule Take 15 mg by mouth 3 times daily as needed for Sleep or Anxiety.  Insulin Detemir (LEVEMIR SC) Inject 12 Units into the skin nightly      torsemide (DEMADEX) 20 MG tablet Take 1 tablet by mouth daily (Patient taking differently: Take 40 mg by mouth daily ) 30 tablet 3    lidocaine (LIDODERM) 5 % Place 2 patches onto the skin daily 12 hours on, 12 hours off. 30 patch 0    gabapentin (NEURONTIN) 100 MG capsule Take 1 capsule by mouth 2 times daily for 30 days. . (Patient taking differently: Take 100 mg by mouth 2 times daily.  Unsure of dose but takes 3 times a day.) 60 capsule 0    mometasone-formoterol (DULERA) 100-5 MCG/ACT inhaler Inhale 2 puffs into the lungs 2 times daily as needed      dorzolamide (TRUSOPT) 2 % ophthalmic solution Place 1 drop into both eyes 2 times daily      calcitRIOL (ROCALTROL) 0.25 MCG capsule Take 0.25 mcg by mouth See Admin Instructions Takes only on tuesday and friday      traZODone (DESYREL) 50 MG tablet Take 150 mg by mouth nightly       esomeprazole Magnesium (NEXIUM) 40 MG PACK Take 20 mg by mouth 2 times daily       citalopram (CELEXA) 20 MG tablet Take 20 mg by mouth daily      atorvastatin (LIPITOR) 80 MG tablet Take 80 mg by mouth nightly       levothyroxine (SYNTHROID) 100 MCG tablet Take 100 mcg by mouth daily      nitroGLYCERIN (NITROSTAT) 0.4 MG SL tablet Place 0.4 mg under the tongue every 5 minutes as needed for Chest pain      desloratadine (CLARINEX) 5 MG tablet Take 5 mg by mouth daily      bimatoprost (LUMIGAN) 0.01 % SOLN ophthalmic drops Place 1 drop into both eyes nightly      aspirin 325 MG tablet Take 325 mg by mouth daily      ferrous sulfate 325 (65 FE) MG tablet Take 325 mg by mouth 2 times daily (with meals)        Allergies   Allergen Reactions    Aciphex [Rabeprazole]      Intolerance      Altace [Ramipril]      Intolerance      Codeine     Cozaar [Losartan]      Intolerance      Iodine Itching    Iv Dye [Iodides]     Sulfa Antibiotics Nausea Only    Ativan [Lorazepam] Anxiety       REVIEW OF SYSTEMS  10 systems reviewed, pertinent positives per HPI otherwise noted to be negative. PHYSICAL EXAM  BP (!) 173/74   Pulse 96   Temp 98.2 °F (36.8 °C) (Oral)   Resp 16   Ht 5' 1\" (1.549 m)   Wt 147 lb (66.7 kg)   SpO2 97%   BMI 27.78 kg/m²   GENERAL APPEARANCE: Awake and alert. Cooperative. No acute distress. HEAD: Normocephalic. Atraumatic. EYES: PERRL. EOM's grossly intact. ENT: Mucous membranes are moist.   NECK: Supple. Non-tender  HEART: RRR. LUNGS: Bibasilar crackles  ABDOMEN: Soft. Non-distended. Non-tender. No masses. No organomegaly.  No guarding or rebound. BACK:  No midline Tenderness. EXTREMITIES: Limited range of motion of the left arm secondary to pain. Tenderness to palpation distal humerus area. All extremities neurovascularly intact. SKIN: Warm and dry. No acute rashes. NEUROLOGICAL: Alert and oriented. CN's 2-12 intact. No gross facial drooping. Strength 5/5, sensation intact. 2 plus DTR's in lower extremity bilaterally. PSYCHIATRIC: Normal mood and affect. LABS  I have reviewed all labs for this visit.    Results for orders placed or performed during the hospital encounter of 04/17/19   CBC Auto Differential   Result Value Ref Range    WBC 6.2 4.0 - 11.0 K/uL    RBC 4.07 4.00 - 5.20 M/uL    Hemoglobin 12.9 12.0 - 16.0 g/dL    Hematocrit 38.8 36.0 - 48.0 %    MCV 95.4 80.0 - 100.0 fL    MCH 31.8 26.0 - 34.0 pg    MCHC 33.4 31.0 - 36.0 g/dL    RDW 13.5 12.4 - 15.4 %    Platelets 412 500 - 750 K/uL    MPV 7.3 5.0 - 10.5 fL    Neutrophils % 86.2 %    Lymphocytes % 8.4 %    Monocytes % 5.0 %    Eosinophils % 0.0 %    Basophils % 0.4 %    Neutrophils # 5.3 1.7 - 7.7 K/uL    Lymphocytes # 0.5 (L) 1.0 - 5.1 K/uL    Monocytes # 0.3 0.0 - 1.3 K/uL    Eosinophils # 0.0 0.0 - 0.6 K/uL    Basophils # 0.0 0.0 - 0.2 K/uL   Comprehensive Metabolic Panel w/ Reflex to MG   Result Value Ref Range    Sodium 138 136 - 145 mmol/L    Potassium reflex Magnesium 3.9 3.5 - 5.1 mmol/L    Chloride 97 (L) 99 - 110 mmol/L    CO2 31 21 - 32 mmol/L    Anion Gap 10 3 - 16    Glucose 182 (H) 70 - 99 mg/dL    BUN 17 7 - 20 mg/dL    CREATININE 1.0 0.6 - 1.2 mg/dL    GFR Non-African American 52 (A) >60    GFR African American >60 >60    Calcium 8.6 8.3 - 10.6 mg/dL    Total Protein 6.3 (L) 6.4 - 8.2 g/dL    Alb 3.6 3.4 - 5.0 g/dL    Albumin/Globulin Ratio 1.3 1.1 - 2.2    Total Bilirubin 0.5 0.0 - 1.0 mg/dL    Alkaline Phosphatase 75 40 - 129 U/L    ALT 7 (L) 10 - 40 U/L    AST 12 (L) 15 - 37 U/L    Globulin 2.7 g/dL   Troponin   Result Value Ref Range    Troponin <0.01 <0.01 ng/mL   Brain Natriuretic Peptide   Result Value Ref Range    Pro-BNP 2,248 (H) 0 - 449 pg/mL   EKG 12 Lead   Result Value Ref Range    Ventricular Rate 83 BPM    Atrial Rate 83 BPM    P-R Interval 194 ms    QRS Duration 96 ms    Q-T Interval 428 ms    QTc Calculation (Bazett) 502 ms    P Axis 45 degrees    R Axis 85 degrees    T Axis -4 degrees    Diagnosis       Normal sinus rhythmPossible Inferior infarct (cited on or before 04-JAN-2019)Abnormal ECGWhen compared with ECG of 04-JAN-2019 16:17,[place] interval has decreased       The Ekg interpreted by me shows  normal sinus rhythm with a rate of 83  Axis is   Normal  QTc is  normal  Inferior Q waves   ST Segments: nonspecific changes        RADIOLOGY    Xr Humerus Left (min 2 Views)    Result Date: 4/17/2019  EXAMINATION: TWO XRAY VIEWS OF THE LEFT HUMERUS 4/17/2019 1:41 am COMPARISON: None. HISTORY: ORDERING SYSTEM PROVIDED HISTORY: PAIN TECHNOLOGIST PROVIDED HISTORY: Reason for exam:->PAIN Ordering Physician Provided Reason for Exam: been hurting for a couple of days, pt states she has not fallen- no trauma Relevant Medical/Surgical History: hx of breast CA FINDINGS: Minimally displaced distal humeral transverse fracture within an area of ovoid lytic/moth eaten appearing humerus with widened indistinct zone of transition. Features of chronic rotator cuff injury involving the included left shoulder. Atelectasis and small effusion along the included left lung. Distal humeral fracture, presumably pathologic as it occurs in an area demonstrating subtle lytic irregularity suggesting underlying bone lesion.      Xr Chest Portable    Result Date: 4/17/2019  EXAMINATION: SINGLE XRAY VIEW OF THE CHEST 4/17/2019 4:22 am COMPARISON: Chest radiographs 01/07/2019, 01/04/2019 HISTORY: ORDERING SYSTEM PROVIDED HISTORY: FALL TECHNOLOGIST PROVIDED HISTORY: Reason for exam:->FALL Ordering Physician Provided Reason for Exam: fall-arm injury Acuity: Acute Type of Exam: Initial FINDINGS: No pneumothorax. Unchanged left pleural effusion. Bibasilar atelectasis. Central vascular congestion with slightly decreased interstitial prominence. Stable cardiomediastinal contours with postsurgical changes of CABG. No displaced rib fracture or any other acute osseous abnormality. Features of chronic rotator cuff abnormality, bilaterally. Mild heart failure with slightly improved pulmonary edema and unchanged left pleural effusion. No acute osseous abnormality. ED COURSE/MDM  Patient seen and evaluated. Old records reviewed. Labs and imaging reviewed and results discussed with patient. This is a 77-year-old female presenting with left arm pain. She is found to have a distal humerus fracture which is suspected to be pathological in nature. Patient has congestive heart failure identified on chest x-ray and was given IV Lasix. CLINICAL IMPRESSION  1. Other closed nondisplaced fracture of distal end of left humerus, initial encounter    2. Fall, initial encounter    3. Acute on chronic congestive heart failure, unspecified heart failure type (HCC)        Blood pressure (!) 173/74, pulse 96, temperature 98.2 °F (36.8 °C), temperature source Oral, resp. rate 16, height 5' 1\" (1.549 m), weight 147 lb (66.7 kg), SpO2 97 %. Terra Braswell was admitted in fair condition.          Kelle Dance, DO  04/17/19 5711

## 2019-04-17 NOTE — ED NOTES
Brisk cap refill to fingers on left hand, sensation intact in fingers left hand.      Barbara Dillard, [facility]  04/17/19 8463

## 2019-04-17 NOTE — CARE COORDINATION
[facility]    Writer received notification from [facility] SN regarding pt's admission. Pt is active with [facility] for SN services, pt is also on [place] [address] Program for Management of CHF. Pt lives alone and [facility] SN discussed plan of care with pt's dtr, Pal Baum. Writer discussed with JOSIE Laws RN, CM about pt being active with [facility] and writer was made aware the pt will be made inpatient and admitted to - 218-2. Per PT/OT rec's at [facility] rec's are for SNF. I will continue to follow patient for needs and arrange for [facility] with [facility] upon [address] home. Should pt dc over the weekend please fax facesheet, order, and NICOLE to [facility].      Donnell Like ENRIQUETA CTN with [facility]  LTP:[phone]

## 2019-04-17 NOTE — ED NOTES
Called Case Management @ 0740 per Elza/Casper. No answer  Again @ 0806.  No answer  RE: Assistance with Discharge Brandon 22 notified @ [address]  04/17/19 9726

## 2019-04-17 NOTE — PROGRESS NOTES
Secure message sent to :    Pt is complaining of uncomfortable leg cramps, she is asking for some cream to help her. At home, she uses cream to help with cramping. Please advise. Thank you. Awaiting response.

## 2019-04-17 NOTE — ED PROVIDER NOTES
Social connections:     Talks on phone: Not on file     Gets together: Not on file     Attends Gnosticism service: Not on file     Active member of club or organization: Not on file     Attends meetings of clubs or organizations: Not on file     Relationship status: Not on file    Intimate partner violence:     Fear of current or ex partner: Not on file     Emotionally abused: Not on file     Physically abused: Not on file     Forced sexual activity: Not on file   Other Topics Concern    Not on file   Social History Narrative    Not on file     Current Facility-Administered Medications   Medication Dose Route Frequency Provider Last Rate Last Dose    HYDROmorphone (DILAUDID) injection 0.5 mg  0.5 mg Intravenous Once Alexander Neema, DO         Current Outpatient Medications   Medication Sig Dispense Refill    insulin lispro (HUMALOG) 100 UNIT/ML injection vial Inject 6 Units into the skin 2 times daily      oxazepam (SERAX) 15 MG capsule Take 15 mg by mouth 3 times daily as needed for Sleep or Anxiety.  Insulin Detemir (LEVEMIR SC) Inject 12 Units into the skin nightly      torsemide (DEMADEX) 20 MG tablet Take 1 tablet by mouth daily (Patient taking differently: Take 40 mg by mouth daily ) 30 tablet 3    lidocaine (LIDODERM) 5 % Place 2 patches onto the skin daily 12 hours on, 12 hours off. 30 patch 0    gabapentin (NEURONTIN) 100 MG capsule Take 1 capsule by mouth 2 times daily for 30 days. . (Patient taking differently: Take 100 mg by mouth 2 times daily.  Unsure of dose but takes 3 times a day.) 60 capsule 0    mometasone-formoterol (DULERA) 100-5 MCG/ACT inhaler Inhale 2 puffs into the lungs 2 times daily as needed      dorzolamide (TRUSOPT) 2 % ophthalmic solution Place 1 drop into both eyes 2 times daily      calcitRIOL (ROCALTROL) 0.25 MCG capsule Take 0.25 mcg by mouth See Admin Instructions Takes only on tuesday and friday      traZODone (DESYREL) 50 MG tablet Take 150 mg by mouth nightly  esomeprazole Magnesium (NEXIUM) 40 MG PACK Take 20 mg by mouth 2 times daily       citalopram (CELEXA) 20 MG tablet Take 20 mg by mouth daily      atorvastatin (LIPITOR) 80 MG tablet Take 80 mg by mouth nightly       levothyroxine (SYNTHROID) 100 MCG tablet Take 100 mcg by mouth daily      nitroGLYCERIN (NITROSTAT) 0.4 MG SL tablet Place 0.4 mg under the tongue every 5 minutes as needed for Chest pain      desloratadine (CLARINEX) 5 MG tablet Take 5 mg by mouth daily      bimatoprost (LUMIGAN) 0.01 % SOLN ophthalmic drops Place 1 drop into both eyes nightly      aspirin 325 MG tablet Take 325 mg by mouth daily      ferrous sulfate 325 (65 FE) MG tablet Take 325 mg by mouth 2 times daily (with meals)        Allergies   Allergen Reactions    Aciphex [Rabeprazole]      Intolerance      Altace [Ramipril]      Intolerance      Codeine     Cozaar [Losartan]      Intolerance      Iodine Itching    Iv Dye [Iodides]     Sulfa Antibiotics Nausea Only    Ativan [Lorazepam] Anxiety       REVIEW OF SYSTEMS  10 systems reviewed, pertinent positives per HPI otherwise noted to be negative. PHYSICAL EXAM  BP (!) 176/84   Pulse 92   Temp 98.2 °F (36.8 °C) (Oral)   Resp 14   Ht 5' 1\" (1.549 m)   Wt 147 lb (66.7 kg)   SpO2 95%   BMI 27.78 kg/m²   GENERAL APPEARANCE: Awake and alert. Cooperative. No acute distress. HEAD: Normocephalic. Atraumatic. EYES: PERRL. EOM's grossly intact. ENT: Mucous membranes are moist.   NECK: Supple. HEART: RRR. LUNGS: Respirations unlabored. CTAB. Good air exchange. Speaking comfortably in full sentences. BACK: No midline spinal tenderness or step-off. ABDOMEN: Soft. Non-distended. Non-tender. No guarding or rebound. Normal bowel sounds. EXTREMITIES: Left arm pain, decreased ROm 2/2 pain All extremities neurovascularly intact. SKIN: Warm and dry. No acute rashes. NEUROLOGICAL: Alert and oriented. No gross facial drooping. Strength 5/5, sensation intact.  Normal coordination. Gait normal.   PSYCHIATRIC: Normal mood and affect. LABS  I have reviewed all labs for this visit.    Results for orders placed or performed during the hospital encounter of 04/17/19   CBC Auto Differential   Result Value Ref Range    WBC 6.2 4.0 - 11.0 K/uL    RBC 4.07 4.00 - 5.20 M/uL    Hemoglobin 12.9 12.0 - 16.0 g/dL    Hematocrit 38.8 36.0 - 48.0 %    MCV 95.4 80.0 - 100.0 fL    MCH 31.8 26.0 - 34.0 pg    MCHC 33.4 31.0 - 36.0 g/dL    RDW 13.5 12.4 - 15.4 %    Platelets 033 783 - 074 K/uL    MPV 7.3 5.0 - 10.5 fL    Neutrophils % 86.2 %    Lymphocytes % 8.4 %    Monocytes % 5.0 %    Eosinophils % 0.0 %    Basophils % 0.4 %    Neutrophils # 5.3 1.7 - 7.7 K/uL    Lymphocytes # 0.5 (L) 1.0 - 5.1 K/uL    Monocytes # 0.3 0.0 - 1.3 K/uL    Eosinophils # 0.0 0.0 - 0.6 K/uL    Basophils # 0.0 0.0 - 0.2 K/uL   Comprehensive Metabolic Panel w/ Reflex to MG   Result Value Ref Range    Sodium 138 136 - 145 mmol/L    Potassium reflex Magnesium 3.9 3.5 - 5.1 mmol/L    Chloride 97 (L) 99 - 110 mmol/L    CO2 31 21 - 32 mmol/L    Anion Gap 10 3 - 16    Glucose 182 (H) 70 - 99 mg/dL    BUN 17 7 - 20 mg/dL    CREATININE 1.0 0.6 - 1.2 mg/dL    GFR Non-African American 52 (A) >60    GFR African American >60 >60    Calcium 8.6 8.3 - 10.6 mg/dL    Total Protein 6.3 (L) 6.4 - 8.2 g/dL    Alb 3.6 3.4 - 5.0 g/dL    Albumin/Globulin Ratio 1.3 1.1 - 2.2    Total Bilirubin 0.5 0.0 - 1.0 mg/dL    Alkaline Phosphatase 75 40 - 129 U/L    ALT 7 (L) 10 - 40 U/L    AST 12 (L) 15 - 37 U/L    Globulin 2.7 g/dL   Troponin   Result Value Ref Range    Troponin <0.01 <0.01 ng/mL   Brain Natriuretic Peptide   Result Value Ref Range    Pro-BNP 2,248 (H) 0 - 449 pg/mL   Hemoglobin A1c   Result Value Ref Range    Hemoglobin A1C 7.5 See comment %    eAG 168.6 mg/dL   Troponin   Result Value Ref Range    Troponin <0.01 <0.01 ng/mL   Troponin   Result Value Ref Range    Troponin <0.01 <0.01 ng/mL   Vitamin D 25 hydroxy   Result Value Ref pg/mL   Basic Metabolic Panel   Result Value Ref Range    Sodium 136 136 - 145 mmol/L    Potassium 4.1 3.5 - 5.1 mmol/L    Chloride 93 (L) 99 - 110 mmol/L    CO2 34 (H) 21 - 32 mmol/L    Anion Gap 9 3 - 16    Glucose 155 (H) 70 - 99 mg/dL    BUN 22 (H) 7 - 20 mg/dL    CREATININE 1.3 (H) 0.6 - 1.2 mg/dL    GFR Non- 38 (A) >60    GFR  46 (A) >60    Calcium 8.8 8.3 - 10.6 mg/dL   Magnesium   Result Value Ref Range    Magnesium 2.00 1.80 - 2.40 mg/dL   POCT Glucose   Result Value Ref Range    POC Glucose 233 (H) 70 - 99 mg/dl    Performed on ACCU-CHEK    POCT Glucose   Result Value Ref Range    POC Glucose 241 (H) 70 - 99 mg/dl    Performed on ACCU-CHEK    POCT Glucose   Result Value Ref Range    POC Glucose 279 (H) 70 - 99 mg/dl    Performed on ACCU-CHEK    POCT Glucose   Result Value Ref Range    POC Glucose 113 (H) 70 - 99 mg/dl    Performed on ACCU-CHEK    POCT Glucose   Result Value Ref Range    POC Glucose 288 (H) 70 - 99 mg/dl    Performed on ACCU-CHEK    POCT Glucose   Result Value Ref Range    POC Glucose 230 (H) 70 - 99 mg/dl    Performed on ACCU-CHEK    POCT Glucose   Result Value Ref Range    POC Glucose 109 (H) 70 - 99 mg/dl    Performed on ACCU-CHEK    POCT Glucose   Result Value Ref Range    POC Glucose 123 (H) 70 - 99 mg/dl    Performed on ACCU-CHEK    POCT Glucose   Result Value Ref Range    POC Glucose 151 (H) 70 - 99 mg/dl    Performed on ACCU-CHEK    POCT Glucose   Result Value Ref Range    POC Glucose 294 (H) 70 - 99 mg/dl    Performed on ACCU-CHEK    POCT Glucose   Result Value Ref Range    POC Glucose 102 (H) 70 - 99 mg/dl    Performed on ACCU-CHEK    POCT Glucose   Result Value Ref Range    POC Glucose 54 (L) 70 - 99 mg/dl    Performed on ACCU-CHEK    POCT Glucose   Result Value Ref Range    POC Glucose 114 (H) 70 - 99 mg/dl    Performed on ACCU-CHEK    POCT Glucose   Result Value Ref Range    POC Glucose 142 (H) 70 - 99 mg/dl    Performed on ACCU-CHEK    POCT Glucose   Result Value Ref Range    POC Glucose 168 (H) 70 - 99 mg/dl    Performed on ACCU-CHEK    POCT Glucose   Result Value Ref Range    POC Glucose 146 (H) 70 - 99 mg/dl    Performed on ACCU-CHEK    POCT Glucose   Result Value Ref Range    POC Glucose 335 (H) 70 - 99 mg/dl    Performed on ACCU-CHEK    POCT Glucose   Result Value Ref Range    POC Glucose 202 (H) 70 - 99 mg/dl    Performed on ACCU-CHEK    POCT Glucose   Result Value Ref Range    POC Glucose 126 (H) 70 - 99 mg/dl    Performed on ACCU-CHEK    EKG 12 Lead   Result Value Ref Range    Ventricular Rate 83 BPM    Atrial Rate 83 BPM    P-R Interval 194 ms    QRS Duration 96 ms    Q-T Interval 428 ms    QTc Calculation (Bazett) 502 ms    P Axis 45 degrees    R Axis 85 degrees    T Axis -4 degrees    Diagnosis       Normal sinus rhythmProlonged QTPossible Inferior infarct (cited on or before 04-JAN-2019)Abnormal ECGWhen compared with ECG of 04-JAN-2019 16:17,WI interval has decreasedConfirmed by [facility] YOAV QUINONEZ MD ([id number]) on 4/17/2019 8:43:13 AM         RADIOLOGY  X-RAYS:  I have reviewed radiologic plain film image(s). ALL OTHER NON-PLAIN FILM IMAGES SUCH AS CT, ULTRASOUND AND MRI HAVE BEEN READ BY THE RADIOLOGIST. XR CHEST PORTABLE   Final Result   Mild heart failure with slightly improved pulmonary edema and unchanged left   pleural effusion. No acute osseous abnormality. XR HUMERUS LEFT (MIN 2 VIEWS)   Final Result   Distal humeral fracture, presumably pathologic as it occurs in an area   demonstrating subtle lytic irregularity suggesting underlying bone lesion. ED COURSE/MDM  Patient seen and evaluated. Pt with distal humerus fx. Freq falls, ambulates only with walker. Will need likely placement as lives alone and is fall risk and needs walker for ambulation but now has L arm in sling and splint. Will have pt/ot eval for placement. CLINICAL IMPRESSION  1.  Other closed nondisplaced fracture of distal end of left humerus, initial encounter        Blood pressure (!) 176/84, pulse 92, temperature 98.2 °F (36.8 °C), temperature source Oral, resp. rate 14, height 5' 1\" (1.549 m), weight 147 lb (66.7 kg), SpO2 95 %. Jessy Quevedo was admitted in stable condition. This chart was generated in part by using Dragon Dictation system and may contain errors related to that system including errors in grammar, punctuation, and spelling, as well as words and phrases that may be inappropriate. When dictating, effort is made to correct spelling/grammar errors. If there are any questions or concerns please feel free to contact the dictating provider for clarification.      Clive Vázquez, DO  EMERGENCY MEDICINE       Savannah Apley, DO  04/17/19 2028 [facility], DO  04/20/19 2310

## 2019-04-17 NOTE — ED NOTES
Nurse to room with physician to place long arm splint, patient tolerated fair, sling applied. Patient re adjusted in bed.  Awaiting morning for pt/ot to see patient for possible placement vs admission     Griffin Ricketts RN  04/17/19 [id number]

## 2019-04-17 NOTE — DISCHARGE INSTR - COC
Continuity of Care Form    Patient Name: Gerri Hill   :  1928  MRN:  [id number]    Admit date:  2019  Discharge date:  19    Code Status Order: Prior   Advance Directives:     Admitting Physician:  Mariah Siu MD  PCP: [address]    Discharging Nurse: [facility] Unit/Room#:   Discharging Unit Phone Number: [phone]    Emergency Contact:   Extended Emergency Contact Information  Primary Emergency Contact: Bobo Phone: [phone]  Relation: Child  Secondary Emergency Contact: Georgina Herbert Phone: [phone]  Mobile Phone: [phone]  Relation: Child    Past Surgical History:  Past Surgical History:   Procedure Laterality Date    CARDIAC SURGERY      CHOLECYSTECTOMY      CORONARY ARTERY BYPASS GRAFT      2 way     SPINE SURGERY      THYROIDECTOMY         Immunization History:   Immunization History   Administered Date(s) Administered    Td, unspecified formulation 2015       Active Problems:  Patient Active Problem List   Diagnosis Code    Acute on chronic diastolic congestive heart failure (HCC) I50.33    Pleural effusion J90    Coronary artery disease involving native heart without angina pectoris I25.10    Non-rheumatic mitral regurgitation I34.0    Bilateral carotid artery stenosis I65.23    Mixed hyperlipidemia E78.2    Acute on chronic diastolic CHF (congestive heart failure) ([facility]) I50.33    Diabetes mellitus (HCC) E11.9    Benign essential HTN I10    CHF (congestive heart failure), NYHA class III, unspecified failure chronicity, combined [facility] I50.40       Isolation/Infection:   Isolation          No Isolation            Nurse Assessment:  Last Vital Signs: BP (!) 175/81   Pulse 84   Temp 98.3 °F (36.8 °C) (Oral)   Resp 15   Ht 5' 1\" (1.549 m)   Wt 147 lb (66.7 kg)   SpO2 97%   BMI 27.78 kg/m²     Last documented pain score (0-10 scale): Pain Level: 10  Last Weight:   Wt Readings from Last 1 Encounters:   04/17/19 147 lb (66.7 kg)     Mental Status:  oriented and alert    IV Access:  - None    Nursing Mobility/ADLs:  Walking   Assisted  Transfer  Assisted  Bathing  Assisted  Dressing  Independent  [address] [facility] 22 Delivery   whole    Wound Care Documentation and Therapy:        Elimination:  Continence:   · Bowel: Yes  · Bladder: No  Urinary Catheter: None   Colostomy/Ileostomy/Ileal Conduit: No       Date of Last BM: 4/23/19    Safety Concerns:     History of Falls (last 30 days) and At Risk for Falls    Impairments/Disabilities:      Vision, Hearing and L arm fracture    Nutrition Therapy:  Current Nutrition Therapy:   - Oral Diet:  Carb Control 4 carbs/meal (1800kcals/day) and Low Sodium (2gm)    Routes of Feeding: Oral  Liquids: No Restrictions  Daily Fluid Restriction: yes - amount 1800  Last Modified Barium Swallow with Video (Video Swallowing Test): not done    Treatments at the Time of Hospital Discharge:   Respiratory Treatments: none  Oxygen Therapy:  is not on home oxygen therapy.   Ventilator:    - No ventilator support    Rehab Therapies: Physical Therapy and Occupational Therapy  Weight Bearing Status/Restrictions: No weight bearing restirctions  Other Medical Equipment (for information only, NOT a DME order):  walker  Other Treatments: L arm splint for fracture    Patient's personal belongings (please select all that are sent with patient):  Glasses, Hearing Aides bilateral, Jewelry    RN SIGNATURE:  Electronically signed by Leah Ibrahim RN on 4/24/19 at 11:09 AM    CASE MANAGEMENT/SOCIAL WORK SECTION    Inpatient Status Date: 04/17/2019    Readmission Risk Assessment Score:  Readmission Risk              Risk of Unplanned Readmission:        0           Discharging to Facility/ Agency   · Name: [facility] [address]  · [phone]  · Fax:[phone]    / signature: Electronically signed by Denise Donato, RN on 4/25/19 at 3:56 PM    PHYSICIAN SECTION    Prognosis: Fair    Condition at Discharge: Stable    Rehab Potential (if transferring to Rehab): Good    Recommended Labs or Other Treatments After Discharge: Follow up with PCP within 1-2 weeks. Check a renal panel in 1 week to make sure she isn't on too strong of a diuretic. Physician Certification: I certify the above information and transfer of Segun Sigaal  is necessary for the continuing treatment of the diagnosis listed and that she requires [place] for less 30 days. Update Admission H&P: No change in H&P   Recommended Follow-up, Labs or Other Treatments After Discharge: Follow up with PCP within 1-2 weeks. Check a renal panel in 1 week to make sure she isn't on too strong of a diuretic.                 PHYSICIAN SIGNATURE:  Electronically signed by Tio Diaz MD on 4/25/19 at 4:41 PM

## 2019-04-17 NOTE — CARE COORDINATION
CASE MANAGEMENT INITIAL ASSESSMENT      Reviewed chart and met with patient today, re: 80year old female. Explained Case Management role/services. Family present: son  Primary contact information: Suki Gandhi [phone]    Admit date/status: 4/17/19  Diagnosis: CHF    Insurance: medicare primary and [employer] secondary   Precert required for SNF - no     3 night stay required - yes    Living arrangements, Adls, care needs, prior to admission: patient lives alone in a single story house. Normally very independent in ADL's     Transportation: private    [employer] at home: Walker_X_Cane__RTS__ BSC__Shower Chair_X_  02__ HHN__ CPAP__  BiPap__  Hospital Bed__ W/C___ Other_walk in tub, grab bars_________    Services in the home and/or outpatient, prior to admission: [address]     PT[facility]OT recs: [facility] Exemption Notification (HEN): not initiated    Barriers to discharge: none    Plan/comments:   Met with patient in ED to discuss potential discharge needs. Patient and son both verbalizing that patient will need to go to rehab at discharge. Aware of need for inpatient stay to qualify. Patient being admitted to floor for CHF requiring oxygen supplement.   Referral sent to [facility] per family request.  Call placed to ensure reciept    ECOC on chart for MD signature     Steven Borrego RN

## 2019-04-17 NOTE — H&P
Hospital Medicine History & Physical      PCP: [address]    Date of Admission: 4/17/2019    Date of Service: Pt seen/examined on 4/17/2019 and Admitted to Inpatient with expected LOS greater than two midnights due to medical therapy. Chief Complaint:  Left arm pain, increasing shortness of breath with minimal exertion      History Of Present Illness:      80 y.o. female who presented to [facility] with subacute onset of pain in left arm for last 3 weeks, also noted gradually worsening shortness of breath on exertion and increasing swelling on lower extremity. Patient denies any accidental fall or injury, fever, chest pain, cough or congestion, abdominal pain, nausea or vomiting. In ER workup patient was found possible pathological fracture on left lower end of the humerus and CHF. Patient was admitted for further evaluation and management. Past Medical History:          Diagnosis Date    Anxiety     CAD (coronary artery disease)     Cancer ([facility] [address]     breast    CHF (congestive heart failure) (HCC)     Depression     Diabetes mellitus ([facility] [facility]     Glaucoma     Hyperlipidemia     Hypertension     Macular degeneration     Thyroid disease     TIA (transient ischemic attack)        Past Surgical History:          Procedure Laterality Date    CARDIAC SURGERY      CHOLECYSTECTOMY      CORONARY ARTERY BYPASS GRAFT      2 way     SPINE SURGERY      THYROIDECTOMY         Medications Prior to Admission:      Prior to Admission medications    Medication Sig Start Date End Date Taking? Authorizing Provider   insulin lispro (HUMALOG) 100 UNIT/ML injection vial Inject 6 Units into the skin 2 times daily   Yes Historical Provider, MD   oxazepam (SERAX) 15 MG capsule Take 15 mg by mouth 3 times daily as needed for Sleep or Anxiety.    Yes Historical Provider, MD   Insulin Detemir (LEVEMIR SC) Inject 12 Units into the skin nightly   Yes Historical Provider, MD   torsemide (DEMADEX) 20 MG tablet Take 1 tablet by mouth daily  Patient taking differently: Take 40 mg by mouth daily  1/9/19   Stevie Akins MD   lidocaine (LIDODERM) 5 % Place 2 patches onto the skin daily 12 hours on, 12 hours off. 12/6/18   Theresa Fail, DO   gabapentin (NEURONTIN) 100 MG capsule Take 1 capsule by mouth 2 times daily for 30 days. .  Patient taking differently: Take 100 mg by mouth 2 times daily. Unsure of dose but takes 3 times a day. 6/25/18 1/4/19  Toro Keys MD   mometasone-formoterol [facility]) 100-5 MCG/ACT inhaler Inhale 2 puffs into the lungs 2 times daily as needed    Historical Provider, MD   dorzolamide (TRUSOPT) 2 % ophthalmic solution Place 1 drop into both eyes 2 times daily    Historical Provider, MD   calcitRIOL (ROCALTROL) 0.25 MCG capsule Take 0.25 mcg by mouth See Admin Instructions Takes only on tuesday and friday    Historical Provider, MD   traZODone (DESYREL) 50 MG tablet Take 150 mg by mouth nightly     Historical Provider, MD   esomeprazole Magnesium (NEXIUM) 40 MG PACK Take 20 mg by mouth 2 times daily     Historical Provider, MD   citalopram (CELEXA) 20 MG tablet Take 20 mg by mouth daily    Historical Provider, MD   atorvastatin (LIPITOR) 80 MG tablet Take 80 mg by mouth nightly     Historical Provider, MD   levothyroxine (SYNTHROID) 100 MCG tablet Take 100 mcg by mouth daily    Historical Provider, MD   nitroGLYCERIN (NITROSTAT) 0.4 MG SL tablet Place 0.4 mg under the tongue every 5 minutes as needed for Chest pain    Historical Provider, MD   desloratadine (CLARINEX) 5 MG tablet Take 5 mg by mouth daily    Historical Provider, MD   bimatoprost (LUMIGAN) 0.01 % SOLN ophthalmic drops Place 1 drop into both eyes nightly    Historical Provider, MD   aspirin 325 MG tablet Take 325 mg by mouth daily    Historical Provider, MD   ferrous sulfate 325 (65 FE) MG tablet Take 325 mg by mouth 2 times daily (with meals)     Historical Provider, MD       Allergies:  Aciphex [rabeprazole];  Altace [ramipril]; Codeine; Cozaar [losartan]; Iodine; Iv dye [iodides]; Sulfa antibiotics; and Ativan [lorazepam]    Social History:      The patient currently lives at home    TOBACCO:   reports that she has quit smoking. She has never used smokeless tobacco.  ETOH:   reports that she does not drink alcohol. Family History:       Reviewed in detail and negative for DM, CAD, Cancer, CVA. Positive as follows:    History reviewed. No pertinent family history. REVIEW OF SYSTEMS:   Pertinent positives as noted in the HPI. All other systems reviewed and negative. PHYSICAL EXAM PERFORMED:    BP (!) 180/83   Pulse 94   Temp 98 °F (36.7 °C) (Oral)   Resp 16   Ht 5' 1\" (1.549 m)   Wt 147 lb (66.7 kg)   SpO2 93%   BMI 27.78 kg/m²     General appearance:  No apparent distress, appears stated age and cooperative. HEENT:  Normal cephalic, atraumatic without obvious deformity. Pupils equal, round, and reactive to light. Extra ocular muscles intact. Conjunctivae/corneas clear. Neck: Supple, with full range of motion. No jugular venous distention. Trachea midline. Respiratory:  Normal respiratory effort. Clear to auscultation, bilaterally without Rales/Wheezes/Rhonchi. Cardiovascular:  Regular rate and rhythm with normal S1/S2 without murmurs, rubs or gallops. Abdomen: Soft, non-tender, non-distended with normal bowel sounds. Musculoskeletal:  No clubbing, cyanosis , 3+ edema bilaterally. Left humerus fracture. Skin: Skin color, texture, turgor normal.  No rashes or lesions. Neurologic:  Neurovascularly intact without any focal sensory/motor deficits.  Cranial nerves: II-XII intact, grossly non-focal.  Psychiatric:  Alert and oriented, thought content appropriate, normal insight  Capillary Refill: Brisk,< 3 seconds   Peripheral Pulses: +2 palpable, equal bilaterally       Labs:     Recent Labs     04/17/19  0340   WBC 6.2   HGB 12.9   HCT 38.8        Recent Labs     04/17/19  0340      K 3.9   CL 97*   CO2 31   BUN 17   CREATININE 1.0   CALCIUM 8.6     Recent Labs     04/17/19  0340   AST 12*   ALT 7*   BILITOT 0.5   ALKPHOS 75     No results for input(s): INR in the last 72 hours. Recent Labs     04/17/19  0340 04/17/19  1015   TROPONINI <0.01 <0.01       Urinalysis:      Lab Results   Component Value Date    NITRU Negative 01/06/2019    WBCUA 0-2 12/28/2018    BACTERIA 4+ 12/28/2018    RBCUA 0-2 12/28/2018    BLOODU Negative 01/06/2019    SPECGRAV 1.015 01/06/2019    GLUCOSEU Negative 01/06/2019       Radiology:     CXR: I have reviewed the CXR with the following interpretation:   Mild vascular congestion  EKG:  I have reviewed the EKG with the following interpretation:   NSR, no ischemic changes noted. XR CHEST PORTABLE   Final Result   Mild heart failure with slightly improved pulmonary edema and unchanged left   pleural effusion. No acute osseous abnormality. XR HUMERUS LEFT (MIN 2 VIEWS)   Final Result   Distal humeral fracture, presumably pathologic as it occurs in an area   demonstrating subtle lytic irregularity suggesting underlying bone lesion. ASSESSMENT:    Active Hospital Problems    Diagnosis Date Noted    Coronary artery disease involving native heart without angina pectoris [I25.10]      Priority: High    Acute on chronic diastolic congestive heart failure ([facility] [address] [I50.33] 06/19/2018     Priority: High    Closed fracture of left humerus [S42.302A] 04/17/2019    Diabetes mellitus ([facility] [address] [E11.9] 01/04/2019    Benign essential HTN [I10] 01/04/2019         PLAN:    Acute and chronic diastolic heart failure, admit to telemetry, trend troponin, check 2-D echo with Doppler, patient was started on IV Lasix 40 mg twice a day, monitor I's and O's, low-salt diet, cardiology consulted, monitor. Possible pathological fracture on left humerus, pain medication with Percocet as needed, orthopedic surgery consulted for further recommendation and management.     Coronary artery

## 2019-04-17 NOTE — PROGRESS NOTES
Physical Therapy    Facility/Department: [facility]  ED  Initial Assessment    NAME: Paula Hewitt  : 1928  MRN: [id number]    Date of Service: 2019    Discharge Recommendations:  Subacute/Skilled Nursing Facility   PT Equipment Recommendations  Equipment Needed: No    Assessment   Body structures, Functions, Activity limitations: Decreased functional mobility ; Decreased endurance;Decreased strength;Decreased balance;Decreased sensation;Decreased safe awareness; Increased Pain  Assessment: Pt is 81 yo female who presents with left nondisplaced humerus fracture. Pt Ax2 for bed mobility and CGA for sit<>Stand. Pt mod I with mobility using 4WW at baseline and lives alone. Pt limited by increased pain. PCA notified of pain level. Pt would benefit from continued skilled therapy to address deficits. Recommend SNF at d/c due to pain and pt functioning below baseline. Treatment Diagnosis: decreased (I) with mobility  Specific instructions for Next Treatment: progress mobility as tolerated  Prognosis: Good  Decision Making: Medium Complexity  Patient Education: Role of PT, safety with mobility, POC, d/c recs; pt verbalized understanding  Barriers to Learning: none  REQUIRES PT FOLLOW UP: Yes  Activity Tolerance  Activity Tolerance: Patient Tolerated treatment well;Patient limited by pain  Activity Tolerance: SpO2 91% at end of session on 1L       Patient Diagnosis(es): The primary encounter diagnosis was Other closed nondisplaced fracture of distal end of left humerus, initial encounter. Diagnoses of Fall, initial encounter and Acute on chronic congestive heart failure, unspecified heart failure type [facility]) were also pertinent to this visit.      has a past medical history of Anxiety, CAD (coronary artery disease), Cancer ([facility] [address] CHF (congestive heart failure) ([facility] [address] Depression, Diabetes mellitus ([facility] [address] Glaucoma, Hyperlipidemia, Hypertension, Macular degeneration, Thyroid disease, and TIA (transient ischemic attack). has a past surgical history that includes Cholecystectomy; Cardiac surgery; Thyroidectomy; Spine surgery; and Coronary artery bypass graft. Restrictions  Restrictions/Precautions  Restrictions/Precautions: Weight Bearing, Fall Risk  Required Braces or Orthoses?: Yes  Upper Extremity Weight Bearing Restrictions  Right Upper Extremity Weight Bearing: Non Weight Bearing  Required Braces or Orthoses  Left Upper Extremity Brace/Splint: Sling  Left Upper Extremity Brace/Splint: L long arm splint    Subjective  General  Chart Reviewed: Yes  Patient assessed for rehabilitation services?: Yes  Response To Previous Treatment: Not applicable  Family / Caregiver Present: No  Referring Practitioner: Dr. Gautam Calderon DO  Referral Date : 04/17/19  Diagnosis: CHF; Nondisplaced fracture distal end of left humerus  Follows Commands: Within Functional Limits  General Comment  Comments: Pt resting in bed on approach; RN cleared pt for therapy  Subjective  Subjective: pt agreeable to therapy  Pain Screening  Patient Currently in Pain: Yes  Pain Assessment  Pain Assessment: 0-10  Pain Level: 8  Pain Type: Acute pain  Pain Location: Arm  Pain Orientation: Left  Non-Pharmaceutical Pain Intervention(s): Ambulation/Increased Activity;Repositioned  Response to Pain Intervention: Patient Satisfied       Orientation  Orientation  Overall Orientation Status: Within Functional Limits  Social/Functional History  Social/Functional History  Lives With: Alone  Type of Home: House  Home Layout: One level  Home Access: Ramped entrance  Bathroom Shower/Tub: Walk-in shower  Bathroom Toilet: Handicap height  Bathroom Equipment: Grab bars in shower, Shower chair  Home Equipment: Alert Button, 4 wheeled walker  ADL Assistance: [facility] [address]: Needs assistance(Has cleaning service. Family does grocery shopping.  Pt able to prep small meals)  Ambulation Assistance: Independent(4WW)  Transfer Assistance: Independent  Active : No  Leisure & Hobbies: reading    Objective     RLE AROM: WFL  LLE AROM : WFL  Strength RLE: WFL  Strength LLE: WFL     Sensation  Overall Sensation Status: (c/o numbness LUE)     Bed mobility  Supine to Sit: Dependent/Total(mod A x 2 with increased time)  Sit to Supine: Dependent/Total(max A x 2)     Transfers  Sit to Stand: Contact guard assistance  Stand to sit: Contact guard assistance     Ambulation  Ambulation? : (pt able to take a few lateral steps to [facility] with HHA and CGA)     Balance  Sitting - Static: Good  Sitting - Dynamic: Good;-  Standing - Static: Fair  Standing - Dynamic: Fair;-        Plan   Plan  Times per week: 3-5x/wk  Times per day: Daily  Specific instructions for Next Treatment: progress mobility as tolerated  Current Treatment Recommendations: Strengthening, Gait Training, ROM, Balance Training, Neuromuscular Re-education, Functional Mobility Training, Endurance Training, Transfer Training, Safety Education & Training, Home Exercise Program  Safety Devices  Type of devices: All fall risk precautions in place, Call light within reach, Gait belt, Nurse notified, Left in bed                        AM-PAC Score     AM-PAC Inpatient Mobility without Stair Climbing Raw Score : 13  AM-PAC Inpatient without Stair Climbing T-Scale Score : 38.96  Mobility Inpatient CMS 0-100% Score: 58.44  Mobility Inpatient without Stair CMS G-Code Modifier : CK       Goals  Short term goals  Time Frame for Short term goals: 1 week (4/23/19)  Short term goal 1: Pt will be mod A x 1 for bed mobility. Short term goal 2: Pt will be SBA for sit<>stand and bed<>chair transfers. Short term goal 3: Pt will ambulate 50 ft with LRAD and SBA. Short term goal 4: 4/20: Pt will participate in 12-15 reps of BLE exercises to promote strength and activity tolerance.   Patient Goals   Patient goals : \"to get better\"       Therapy Time   Individual Concurrent Group Co-treatment   Time In 0800         Time Out 0820 Minutes 20         Timed Code Treatment Minutes: [address] [facility], [address] [place] [address], DPT #[id number]

## 2019-04-17 NOTE — ED NOTES
Left arm splint with long arm splint per dr Adrianna Obrien, and sling applied per carlos jauregui rn.        Ronaldo Casanova, [facility]  04/17/19 2685

## 2019-04-18 LAB
ANION GAP SERPL CALCULATED.3IONS-SCNC: 9 MMOL/L (ref 3–16)
BASOPHILS ABSOLUTE: 0 K/UL (ref 0–0.2)
BASOPHILS RELATIVE PERCENT: 0.5 %
BUN BLDV-MCNC: 15 MG/DL (ref 7–20)
CALCIUM SERPL-MCNC: 8.7 MG/DL (ref 8.3–10.6)
CHLORIDE BLD-SCNC: 96 MMOL/L (ref 99–110)
CHOLESTEROL, TOTAL: 125 MG/DL (ref 0–199)
CO2: 35 MMOL/L (ref 21–32)
CREAT SERPL-MCNC: 1 MG/DL (ref 0.6–1.2)
EOSINOPHILS ABSOLUTE: 0 K/UL (ref 0–0.6)
EOSINOPHILS RELATIVE PERCENT: 0.5 %
ESTIMATED AVERAGE GLUCOSE: 168.6 MG/DL
GFR AFRICAN AMERICAN: >60
GFR NON-AFRICAN AMERICAN: 52
GLUCOSE BLD-MCNC: 109 MG/DL (ref 70–99)
GLUCOSE BLD-MCNC: 113 MG/DL (ref 70–99)
GLUCOSE BLD-MCNC: 114 MG/DL (ref 70–99)
GLUCOSE BLD-MCNC: 230 MG/DL (ref 70–99)
GLUCOSE BLD-MCNC: 288 MG/DL (ref 70–99)
HBA1C MFR BLD: 7.5 %
HCT VFR BLD CALC: 37.6 % (ref 36–48)
HDLC SERPL-MCNC: 42 MG/DL (ref 40–60)
HEMOGLOBIN: 12.8 G/DL (ref 12–16)
LDL CHOLESTEROL CALCULATED: 62 MG/DL
LYMPHOCYTES ABSOLUTE: 0.9 K/UL (ref 1–5.1)
LYMPHOCYTES RELATIVE PERCENT: 15 %
MAGNESIUM: 1.7 MG/DL (ref 1.8–2.4)
MCH RBC QN AUTO: 32.1 PG (ref 26–34)
MCHC RBC AUTO-ENTMCNC: 34.1 G/DL (ref 31–36)
MCV RBC AUTO: 94.2 FL (ref 80–100)
MONOCYTES ABSOLUTE: 0.5 K/UL (ref 0–1.3)
MONOCYTES RELATIVE PERCENT: 8 %
NEUTROPHILS ABSOLUTE: 4.8 K/UL (ref 1.7–7.7)
NEUTROPHILS RELATIVE PERCENT: 76 %
PDW BLD-RTO: 13.7 % (ref 12.4–15.4)
PERFORMED ON: ABNORMAL
PLATELET # BLD: 220 K/UL (ref 135–450)
PMV BLD AUTO: 7.4 FL (ref 5–10.5)
POTASSIUM SERPL-SCNC: 3.3 MMOL/L (ref 3.5–5.1)
RBC # BLD: 3.99 M/UL (ref 4–5.2)
SODIUM BLD-SCNC: 140 MMOL/L (ref 136–145)
TRIGL SERPL-MCNC: 105 MG/DL (ref 0–150)
VLDLC SERPL CALC-MCNC: 21 MG/DL
WBC # BLD: 6.3 K/UL (ref 4–11)

## 2019-04-18 PROCEDURE — 6370000000 HC RX 637 (ALT 250 FOR IP): Performed by: INTERNAL MEDICINE

## 2019-04-18 PROCEDURE — 94640 AIRWAY INHALATION TREATMENT: CPT

## 2019-04-18 PROCEDURE — 1200000000 HC SEMI PRIVATE

## 2019-04-18 PROCEDURE — 97530 THERAPEUTIC ACTIVITIES: CPT

## 2019-04-18 PROCEDURE — 97535 SELF CARE MNGMENT TRAINING: CPT

## 2019-04-18 PROCEDURE — 36415 COLL VENOUS BLD VENIPUNCTURE: CPT

## 2019-04-18 PROCEDURE — 80048 BASIC METABOLIC PNL TOTAL CA: CPT

## 2019-04-18 PROCEDURE — 97116 GAIT TRAINING THERAPY: CPT

## 2019-04-18 PROCEDURE — 2580000003 HC RX 258: Performed by: INTERNAL MEDICINE

## 2019-04-18 PROCEDURE — 83735 ASSAY OF MAGNESIUM: CPT

## 2019-04-18 PROCEDURE — 80061 LIPID PANEL: CPT

## 2019-04-18 PROCEDURE — 85025 COMPLETE CBC W/AUTO DIFF WBC: CPT

## 2019-04-18 PROCEDURE — 6360000002 HC RX W HCPCS: Performed by: INTERNAL MEDICINE

## 2019-04-18 RX ORDER — MAGNESIUM SULFATE IN WATER 40 MG/ML
2 INJECTION, SOLUTION INTRAVENOUS ONCE
Status: COMPLETED | OUTPATIENT
Start: 2019-04-18 | End: 2019-04-18

## 2019-04-18 RX ADMIN — OXAZEPAM 15 MG: 15 CAPSULE ORAL at 11:21

## 2019-04-18 RX ADMIN — FERROUS SULFATE TAB 325 MG (65 MG ELEMENTAL FE) 325 MG: 325 (65 FE) TAB at 09:42

## 2019-04-18 RX ADMIN — OXAZEPAM 15 MG: 15 CAPSULE ORAL at 22:16

## 2019-04-18 RX ADMIN — ASPIRIN 325 MG: 325 TABLET, COATED ORAL at 09:41

## 2019-04-18 RX ADMIN — FUROSEMIDE 40 MG: 10 INJECTION, SOLUTION INTRAMUSCULAR; INTRAVENOUS at 11:16

## 2019-04-18 RX ADMIN — GABAPENTIN 100 MG: 100 CAPSULE ORAL at 09:42

## 2019-04-18 RX ADMIN — PANTOPRAZOLE SODIUM 40 MG: 40 TABLET, DELAYED RELEASE ORAL at 22:16

## 2019-04-18 RX ADMIN — OXYCODONE AND ACETAMINOPHEN 1 TABLET: 5; 325 TABLET ORAL at 10:17

## 2019-04-18 RX ADMIN — Medication 2 PUFF: at 08:33

## 2019-04-18 RX ADMIN — OXYCODONE AND ACETAMINOPHEN 1 TABLET: 5; 325 TABLET ORAL at 22:45

## 2019-04-18 RX ADMIN — FUROSEMIDE 40 MG: 10 INJECTION, SOLUTION INTRAMUSCULAR; INTRAVENOUS at 22:16

## 2019-04-18 RX ADMIN — Medication 10 ML: at 09:42

## 2019-04-18 RX ADMIN — INSULIN GLARGINE 12 UNITS: 100 INJECTION, SOLUTION SUBCUTANEOUS at 22:18

## 2019-04-18 RX ADMIN — OXAZEPAM 15 MG: 15 CAPSULE ORAL at 15:17

## 2019-04-18 RX ADMIN — INSULIN LISPRO 6 UNITS: 100 INJECTION, SOLUTION INTRAVENOUS; SUBCUTANEOUS at 18:40

## 2019-04-18 RX ADMIN — INSULIN LISPRO 3 UNITS: 100 INJECTION, SOLUTION INTRAVENOUS; SUBCUTANEOUS at 12:32

## 2019-04-18 RX ADMIN — FERROUS SULFATE TAB 325 MG (65 MG ELEMENTAL FE) 325 MG: 325 (65 FE) TAB at 18:39

## 2019-04-18 RX ADMIN — TRAZODONE HYDROCHLORIDE 150 MG: 50 TABLET ORAL at 22:16

## 2019-04-18 RX ADMIN — PANTOPRAZOLE SODIUM 40 MG: 40 TABLET, DELAYED RELEASE ORAL at 09:42

## 2019-04-18 RX ADMIN — POTASSIUM BICARBONATE 20 MEQ: 782 TABLET, EFFERVESCENT ORAL at 10:17

## 2019-04-18 RX ADMIN — INSULIN LISPRO 2 UNITS: 100 INJECTION, SOLUTION INTRAVENOUS; SUBCUTANEOUS at 18:40

## 2019-04-18 RX ADMIN — POTASSIUM BICARBONATE 20 MEQ: 782 TABLET, EFFERVESCENT ORAL at 22:16

## 2019-04-18 RX ADMIN — DORZOLAMIDE HYDROCHLORIDE 1 DROP: 20 SOLUTION/ DROPS OPHTHALMIC at 09:44

## 2019-04-18 RX ADMIN — CETIRIZINE HYDROCHLORIDE 5 MG: 5 TABLET ORAL at 09:42

## 2019-04-18 RX ADMIN — LEVOTHYROXINE SODIUM 100 MCG: 100 TABLET ORAL at 05:47

## 2019-04-18 RX ADMIN — ATORVASTATIN CALCIUM 80 MG: 80 TABLET, FILM COATED ORAL at 22:17

## 2019-04-18 RX ADMIN — OXYCODONE AND ACETAMINOPHEN 1 TABLET: 5; 325 TABLET ORAL at 18:39

## 2019-04-18 RX ADMIN — LATANOPROST 1 DROP: 50 SOLUTION OPHTHALMIC at 22:18

## 2019-04-18 RX ADMIN — GABAPENTIN 100 MG: 100 CAPSULE ORAL at 22:17

## 2019-04-18 RX ADMIN — Medication 10 ML: at 22:17

## 2019-04-18 RX ADMIN — OXYCODONE AND ACETAMINOPHEN 1 TABLET: 5; 325 TABLET ORAL at 14:29

## 2019-04-18 RX ADMIN — MAGNESIUM SULFATE HEPTAHYDRATE 2 G: 40 INJECTION, SOLUTION INTRAVENOUS at 11:16

## 2019-04-18 RX ADMIN — DORZOLAMIDE HYDROCHLORIDE 1 DROP: 20 SOLUTION/ DROPS OPHTHALMIC at 22:17

## 2019-04-18 RX ADMIN — CITALOPRAM HYDROBROMIDE 20 MG: 20 TABLET ORAL at 09:41

## 2019-04-18 RX ADMIN — ENOXAPARIN SODIUM 30 MG: 30 INJECTION SUBCUTANEOUS at 09:40

## 2019-04-18 ASSESSMENT — PAIN SCALES - GENERAL
PAINLEVEL_OUTOF10: 8
PAINLEVEL_OUTOF10: 9
PAINLEVEL_OUTOF10: 8
PAINLEVEL_OUTOF10: 10
PAINLEVEL_OUTOF10: 0
PAINLEVEL_OUTOF10: 8
PAINLEVEL_OUTOF10: 8

## 2019-04-18 ASSESSMENT — PAIN DESCRIPTION - ORIENTATION
ORIENTATION: LEFT

## 2019-04-18 ASSESSMENT — PAIN DESCRIPTION - LOCATION
LOCATION: ARM

## 2019-04-18 ASSESSMENT — PAIN DESCRIPTION - PAIN TYPE
TYPE: ACUTE PAIN
TYPE: ACUTE PAIN

## 2019-04-18 ASSESSMENT — PAIN DESCRIPTION - PROGRESSION: CLINICAL_PROGRESSION: GRADUALLY WORSENING

## 2019-04-18 ASSESSMENT — PAIN DESCRIPTION - ONSET: ONSET: ON-GOING

## 2019-04-18 NOTE — PROGRESS NOTES
Occupational Therapy  Facility/Department: [facility] A2 CARD TELEMETRY  Daily Treatment Note  NAME: Raissa Doe  : 1928  MRN: [id number]    Date of Service: 2019    Discharge Recommendations:  Subacute/Skilled Nursing Facility     Assessment   Performance deficits / Impairments: Decreased functional mobility ; Decreased ADL status; Decreased endurance;Decreased balance;Decreased high-level IADLs;Decreased safe awareness  Assessment: Pt able to perform bathroom mobility and toilet/chair transfers with min/mod x2 today. Pt experienced increased pain LUE with transfers. (She reported that LUE was \"moving\" though she was in sling, splint and had therapist support). Increased edema noted in L hand today. Recommend SNF for skilled OT services. Cont OT. Prognosis: Good  Patient Education: role of OT, transfers, ADLs, safety   REQUIRES OT FOLLOW UP: Yes  Activity Tolerance  Activity Tolerance: Patient limited by pain  Activity Tolerance: Ortho PA notified regarding pt's c/o increased pain in LUE as she attempts to sit. Safety Devices  Type of devices: Nurse notified;Gait belt;Call light within reach; Left in chair;Chair alarm in place       Patient Diagnosis(es): The primary encounter diagnosis was Other closed nondisplaced fracture of distal end of left humerus, initial encounter. Diagnoses of Fall, initial encounter and Acute on chronic congestive heart failure, unspecified heart failure type [facility]) were also pertinent to this visit. has a past medical history of Anxiety, CAD (coronary artery disease), Cancer ([facility] [address] CHF (congestive heart failure) ([facility] [address] Depression, Diabetes mellitus ([facility] [address] Glaucoma, Hyperlipidemia, Hypertension, Macular degeneration, Thyroid disease, and TIA (transient ischemic attack). has a past surgical history that includes Cholecystectomy; Cardiac surgery; Thyroidectomy;  Spine surgery; and Coronary artery bypass graft. Restrictions  Restrictions/Precautions  Restrictions/Precautions: Weight Bearing, Fall Risk  Required Braces or Orthoses?: Yes  Upper Extremity Weight Bearing Restrictions  Right Upper Extremity Weight Bearing: Non Weight Bearing  Required Braces or Orthoses  Left Upper Extremity Brace/Splint: Sling  Left Upper Extremity Brace/Splint: L long arm splint  Subjective   General  Chart Reviewed: Yes  Patient assessed for rehabilitation services?: Yes  Family / Caregiver Present: Yes(daughter)  Referring Practitioner: Sylvie Soto  Diagnosis: L distal humerus fx (presumably pathological)  Subjective  Subjective: Pt resting in bed at OT arrival.   General Comment  Comments: RN approved therapy   Pain Assessment  Pain Level: 8  Pain Location: Arm  Pain Orientation: Left  Non-Pharmaceutical Pain Intervention(s): Repositioned; Therapeutic presence   Orientation  Orientation  Overall Orientation Status: Within Functional Limits  Objective    ADL  LE Dressing: Maximum assistance(brief)  Toileting: Dependent/Total(Assist x1 to manage pericare and brief plus assist of another for standing balance)  Additional Comments: L arm sling repositioned for support when pt was seated      Balance  Sitting Balance: Contact guard assistance  Standing Balance: Dependent/Total(min x2 with HHA of RUE)  Standing Balance  Time: >1 min   Functional Mobility  Functional - Mobility Device: (R HHA)  Activity: To/from bathroom  Assist Level: Dependent/Total(min x2)  Toilet Transfers  Toilet - Technique: Ambulating(R HHA)  Equipment Used: Raised toilet seat with rails(over toilet)  Toilet Transfer: Minimal assistance;2 Person assistance  Bed mobility  Supine to Sit: Moderate assistance;2 Person assistance(with HOB elevated)  Sit to Supine: Unable to assess  Transfers  Stand Pivot Transfers: Minimal assistance;2 Person assistance(R HHA)  Sit to stand: Minimal assistance;2 Person assistance  Stand to sit: Moderate assistance;2 Person assistance(Pt reporting increased pain in LUE as she attempted to sit. Pt required multiple attempts before she could tolerate sitting on commode or chair. )      Cognition  Following Commands:  Follows one step commands with repetition  Attention Span: Attends with cues to redirect  Problem Solving: Assistance required to correct errors made;Assistance required to identify errors made  Sequencing: Requires cues for some      Plan   Plan  Times per week: 3-5x    AM-PAC Score   AM-PAC Inpatient Daily Activity Raw Score: 11  AM-PAC Inpatient ADL T-Scale Score : 29.04  ADL Inpatient CMS 0-100% Score: 70.42  ADL Inpatient CMS G-Code Modifier : CL  Goals  Short term goals  Time Frame for Short term goals: for 1 week or otherwise specified  Short term goal 1: Perform UE dressing with min A by 4/23  Short term goal 2: Perform functional transfer to chair/commode with CGA by 4/19  Short term goal 3: Perform toileting with mod A  Patient Goals   Patient goals : Pt did not state     Therapy Time   Individual Concurrent Group Co-treatment   Time In [address]         Time Out 1218         Minutes 38         Timed Code Treatment Minutes: [address] OTR/L

## 2019-04-18 NOTE — PROGRESS NOTES
VSS - afebrile. Pt is alert and oriented x 4 with no history of falls. Assessment completed as charted. Bed is in lowest position with 2/4 bed rails raised. Bed alarm turned on, wheels locked, and call light within reach. Patient wearing non-skid socks and verbalizes understanding to call out for assistance. No further requests at this time. Will continue to monitor patient.     Vitals:    04/17/19 2317   BP: 121/64   Pulse: 84   Resp: 16   Temp: 97.9 °F (36.6 °C)   SpO2: 92%     Electronically signed by Gladys Ozuna RN on 4/18/2019 at 2:57 AM

## 2019-04-18 NOTE — CONSULTS
Nutrition Education    Type and Reason for Visit: Consult, Patient Education    Consult received for CHF diet education. Pt eating breakfast at time of visit and did not wish to discuss diet education. Pt did accept handout on HF nutrition therapy. Handout discusses low sodium diet, daily weights, and fluid restriction. Will continue to follow per [facility] and monitor for diet education needs. · Written educational materials provided. · Contact name and number provided. · Refer to Patient Education activity for more details.     Electronically signed by Anayeli Pond RD, LD on 4/18/19 at 10:42 AM    Contact Number: Office: [phone]; [address]: [phone]

## 2019-04-18 NOTE — PROGRESS NOTES
Physical Therapy  Facility/Department: [facility] A2 CARD TELEMETRY  Daily Treatment Note  NAME: Raissa Doe  : 1928  MRN: [id number]    Date of Service: 2019    Discharge Recommendations:  Subacute/Skilled Nursing Facility   PT Equipment Recommendations  Equipment Needed: No    Patient Diagnosis(es): The primary encounter diagnosis was Other closed nondisplaced fracture of distal end of left humerus, initial encounter. Diagnoses of Fall, initial encounter and Acute on chronic congestive heart failure, unspecified heart failure type [facility]) were also pertinent to this visit. has a past medical history of Anxiety, CAD (coronary artery disease), Cancer ([facility] [address] CHF (congestive heart failure) ([facility] [address] Depression, [facility] Glaucoma, Hyperlipidemia, Hypertension, Macular degeneration, Thyroid disease, and TIA (transient ischemic attack). has a past surgical history that includes Cholecystectomy; Cardiac surgery; Thyroidectomy; Spine surgery; and Coronary artery bypass graft. Restrictions  Restrictions/Precautions  Restrictions/Precautions: Weight Bearing, Fall Risk  Required Braces or Orthoses?: Yes  Upper Extremity Weight Bearing Restrictions  Right Upper Extremity Weight Bearing: Non Weight Bearing  Required Braces or Orthoses  Left Upper Extremity Brace/Splint: Sling  Left Upper Extremity Brace/Splint: L long arm splint  Subjective   General  Chart Reviewed: Yes  Response To Previous Treatment: Patient with no complaints from previous session.   Family / Caregiver Present: Yes(daughter)  Referring Practitioner: Dr. Katty Carrero, DO  Subjective  Subjective: pt agreeable to therapy  General Comment  Comments: Pt resting in bed on approach; RN cleared pt for therapy  Pain Screening  Patient Currently in Pain: Yes  Pain Assessment  Pain Assessment: 0-10  Pain Level: 8  Pain Type: Acute pain  Pain Location: Arm  Pain Orientation: Left  Non-Pharmaceutical Pain Intervention(s): Ambulation/Increased Activity;Repositioned;Cold applied; Therapeutic presence  Response to Pain Intervention: Patient Satisfied       Objective   Bed mobility  Supine to Sit: 2 Person assistance(mod A x 2 with HOB elevated)  Sit to Supine: Unable to assess(pt up in chair at end of session)     Transfers  Sit to Stand: Dependent/Total  Stand to sit: Dependent/Total  Comment: Min A x 2 to stand from EOB; min A x 1 and mod A of another to stand from raised toilet seat and to sit due to increased pain with forward flexion to sit     Ambulation  Ambulation?: Yes  Ambulation 1  Surface: level tile  Device: Hand-Held Assist  Assistance: Dependent/Total(min A x 2)  Quality of Gait: Decreased love and step length bilaterally. Cues for upright posture and forward gaze. Decreased foot clearance  Distance: 10 ft + 20 ft        Exercises  Comments: Hand grasp/release LUE x 10             Assessment   Body structures, Functions, Activity limitations: Decreased functional mobility ; Decreased endurance;Decreased strength;Decreased balance;Decreased sensation;Decreased safe awareness; Increased Pain  Assessment: Pt limited by pain this date. Significantly increased time required for all mobility tasks. Increased to required to sit due to increased pain with forward flexion. OT called JORGE Conde to notify of potential adjustment to sling or splint in order to improve stability and decrease pain with mobility. Ax2 required for mobility tasks due to pain and decreased activity tolerance. Pt and pt's daughter educated on fit of sling and positioning on LUE in chair and arm to decrease swelling and pain. Continue to recommend SNF at d/c.   Treatment Diagnosis: decreased (I) with mobility  Specific instructions for Next Treatment: progress mobility as tolerated  Prognosis: Good  Patient Education: Role of PT, safety with mobility, POC, d/c recs, sling, positioning, importance of mobility; pt verbalized understanding  Barriers to Learning: none  REQUIRES PT FOLLOW UP: Yes  Activity Tolerance  Activity Tolerance: Patient Tolerated treatment well;Patient limited by pain            AM-PAC Score     AM-PAC Inpatient Mobility without Stair Climbing Raw Score : 13  AM-PAC Inpatient without Stair Climbing T-Scale Score : 38.96  Mobility Inpatient CMS 0-100% Score: 58.44  Mobility Inpatient without Stair CMS G-Code Modifier : CK       Goals  Short term goals  Time Frame for Short term goals: 1 week (4/23/19)  Short term goal 1: Pt will be mod A x 1 for bed mobility. Short term goal 2: Pt will be SBA for sit<>stand and bed<>chair transfers. Short term goal 3: Pt will ambulate 50 ft with LRAD and SBA. Short term goal 4: 4/20: Pt will participate in 12-15 reps of BLE exercises to promote strength and activity tolerance. Patient Goals   Patient goals : \"to get better\"    Plan    Plan  Times per week: 4-5x/wk  Times per day: Daily  Specific instructions for Next Treatment: progress mobility as tolerated  Current Treatment Recommendations: Strengthening, Gait Training, ROM, Balance Training, Neuromuscular Re-education, Functional Mobility Training, Endurance Training, Transfer Training, Safety Education & Training, Home Exercise Program  Safety Devices  Type of devices:  All fall risk precautions in place, Call light within reach, Gait belt, Patient at risk for falls, Nurse notified, Left in chair, Chair alarm in place     Therapy Time   Individual Concurrent Group Co-treatment   Time In [address]         Minutes 38         Timed Code Treatment Minutes: [place] [id number] [facility]

## 2019-04-18 NOTE — CARE COORDINATION
[address] Transitions Interview     2019    Patient: Funmi Mcclendon Patient : 1928   MRN: [id number]  Reason for Admission: CHF   RARS: Readmission Risk Score: 23         Spoke with: Priyank Angulo        Readmission Risk  Patient Active Problem List   Diagnosis    Acute on chronic diastolic congestive heart failure ([facility] [facility]    Pleural effusion    Coronary artery disease involving native heart without angina pectoris    Non-rheumatic mitral regurgitation    Bilateral carotid artery stenosis    Mixed hyperlipidemia    Acute on chronic diastolic CHF (congestive heart failure) ([facility]    Diabetes mellitus ([facility]    Benign essential HTN    Closed fracture of left humerus       Inpatient Assessment  Care Transitions Summary    Care Transitions Inpatient Review  Medication Review  Are you able to afford your medications?:  Yes  How often do you have difficulty taking your medications?:  I always take them as prescribed. Housing Review  Who do you live with?:  Alone  Are you an active caregiver in your home?:  No  Social Support  Durable Medical Equipment  Patient DME:  Wheelchair, [address] chair, Hospital bed, [facility], Other  Other Patient DME:  ramped entrance, rollator, walk in tub  Patient Home Equipment:  Nebulizer (Comment: but can't find it)  Functional Review  Ability to seek help/take action for Emergent/Urgent situations i.e. fire, crime, inclement weather or health crisis. :  Independent  Ability handle personal hygiene needs (bathing/dressing/grooming): Independent  Ability to manage medications: Independent  Ability to prepare food:  Independent  Ability to maintain home (clean home, laundry):  Dependent  Ability to drive and/or has transportation:  Dependent  Ability to do shopping:  Dependent  Hearing and Vision  Care Transitions Interventions     Met with patient to discuss care transition. Role of CTC and care transition program explained to patient.  Spoke with

## 2019-04-18 NOTE — PLAN OF CARE
Problem: Pain:  Goal: Pain level will decrease  Description  Pain level will decrease  Outcome: Ongoing  Note:   Patient's pain controlled at this time. Resting in bed. Will continue to monitor. Problem: Falls - Risk of:  Goal: Will remain free from falls  Description  Will remain free from falls  Note:   Pt high fall risk. Instructed to use call light before getting out of bed. Call light within reach. Bed in low position. Bed alarm on. Patient free from falls and near-misses this shift. Will continue to monitor patient. Problem: Risk for Impaired Skin Integrity  Goal: Tissue integrity - skin and mucous membranes  Description  Structural intactness and normal physiological function of skin and  mucous membranes. Note:   Patient's skin assessed. See flowsheet. Patient turned in bed. Pressure ulcer prevention in place. No issues with skin integrity this shift. Will continue to monitor.

## 2019-04-18 NOTE — PROGRESS NOTES
Hospitalist Progress Note      PCP: Henna RUELAS    Date of Admission: 4/17/2019    Chief Complaint: Left arm pain, shortness of breath    Hospital Course:      Subjective:   Patient is up in bed, comfortable, not in distress. Denies any chest pain or shortness of breath. No new event overnight noted.        Medications:  Reviewed    Infusion Medications    dextrose       Scheduled Medications    magnesium sulfate  2 g Intravenous Once    potassium bicarb-citric acid  20 mEq Oral BID    furosemide  20 mg Intravenous Once    atorvastatin  80 mg Oral Nightly    aspirin  325 mg Oral Daily    latanoprost  1 drop Ophthalmic Daily    citalopram  20 mg Oral Daily    [START ON 4/19/2019] calcitRIOL  0.25 mcg Oral Once per day on Tue Fri    dorzolamide  1 drop Both Eyes BID    cetirizine  5 mg Oral Daily    traZODone  150 mg Oral Nightly    insulin lispro  6 Units Subcutaneous BID    insulin glargine  12 Units Subcutaneous Nightly    ferrous sulfate  325 mg Oral BID WC    pantoprazole  40 mg Oral BID    gabapentin  100 mg Oral BID    levothyroxine  100 mcg Oral Daily    mometasone-formoterol  2 puff Inhalation BID    sodium chloride flush  10 mL Intravenous 2 times per day    enoxaparin  30 mg Subcutaneous Daily    insulin lispro  0-6 Units Subcutaneous TID WC    insulin lispro  0-3 Units Subcutaneous Nightly    furosemide  40 mg Intravenous BID    oxazepam  15 mg Oral TID     PRN Meds: nitroGLYCERIN, LORazepam, sodium chloride flush, magnesium hydroxide, ondansetron, glucose, dextrose, glucagon (rDNA), dextrose, oxyCODONE-acetaminophen      Intake/Output Summary (Last 24 hours) at 4/18/2019 1154  Last data filed at 4/18/2019 1121  Gross per 24 hour   Intake 1490 ml   Output 2101 ml   Net -611 ml       Physical Exam Performed:    BP (!) 160/79   Pulse 76   Temp 98.9 °F (37.2 °C) (Oral)   Resp 16   Ht 5' 1\" (1.549 m)   Wt 134 lb 14.4 oz (61.2 kg)   SpO2 91%   BMI 25.49 kg/m² General appearance: No apparent distress, appears stated age and cooperative. HEENT: Pupils equal, round, and reactive to light. Conjunctivae/corneas clear. Neck: Supple, with full range of motion. No jugular venous distention. Trachea midline. Respiratory:  Normal respiratory effort. Clear to auscultation, bilaterally without Rales/Wheezes/Rhonchi. Cardiovascular: Regular rate and rhythm with normal S1/S2 without murmurs, rubs or gallops. Abdomen: Soft, non-tender, non-distended with normal bowel sounds. Musculoskeletal: No clubbing, cyanosis or edema bilaterally. Left humerus fracture. Skin: Skin color, texture, turgor normal.  No rashes or lesions. Neurologic:  Neurovascularly intact without any focal sensory/motor deficits. Cranial nerves: II-XII intact, grossly non-focal.  Psychiatric: Alert and oriented, thought content appropriate, normal insight  Capillary Refill: Brisk,< 3 seconds   Peripheral Pulses: +2 palpable, equal bilaterally       Labs:   Recent Labs     04/17/19  0340 04/18/19  0721   WBC 6.2 6.3   HGB 12.9 12.8   HCT 38.8 37.6    220     Recent Labs     04/17/19  0340 04/18/19  0721    140   K 3.9 3.3*   CL 97* 96*   CO2 31 35*   BUN 17 15   CREATININE 1.0 1.0   CALCIUM 8.6 8.7     Recent Labs     04/17/19  0340   AST 12*   ALT 7*   BILITOT 0.5   ALKPHOS 75     No results for input(s): INR in the last 72 hours. Recent Labs     04/17/19  0340 04/17/19  1015 04/17/19  1419   TROPONINI <0.01 <0.01 <0.01       Urinalysis:      Lab Results   Component Value Date    NITRU Negative 01/06/2019    WBCUA 0-2 12/28/2018    BACTERIA 4+ 12/28/2018    RBCUA 0-2 12/28/2018    BLOODU Negative 01/06/2019    SPECGRAV 1.015 01/06/2019    GLUCOSEU Negative 01/06/2019       Radiology:  XR CHEST PORTABLE   Final Result   Mild heart failure with slightly improved pulmonary edema and unchanged left   pleural effusion. No acute osseous abnormality.          XR HUMERUS LEFT (MIN 2 VIEWS)   Final Result   Distal humeral fracture, presumably pathologic as it occurs in an area   demonstrating subtle lytic irregularity suggesting underlying bone lesion. Assessment/Plan:    Active Hospital Problems    Diagnosis Date Noted    Coronary artery disease involving native heart without angina pectoris [I25.10]      Priority: High    Acute on chronic diastolic congestive heart failure ([facility] [address] [I50.33] 06/19/2018     Priority: High    Closed fracture of left humerus [S42.302A] 04/17/2019    [facility] [E11.9] 01/04/2019    Benign essential HTN [I10] 01/04/2019     Acute and chronic diastolic heart failure, admit to telemetry, trend troponin, check 2-D echo with Doppler, patient was started on IV Lasix 40 mg twice a day, monitor I's and O's, low-salt diet, cardiology consulted, monitor. Clinically improving, good urine output with Lasix, monitor renal panel, continue Lasix for now.     Possible pathological fracture on left humerus, pain medication with Percocet as needed, orthopedic surgery consulted for further recommendation and management.     Coronary artery disease, no evidence of ACS, hemodynamically stable, continue current home regimen.     Diabetes mellitus type 2, controlled, continue current home regimen, add low-dose insulin sliding scale, check A1c, monitor.     Hypertension, controlled with current regimen, monitor.         DVT Prophylaxis: Lovenox  Diet: DIET CARB CONTROL; No Added Salt (3-4 GM)  Code Status: Full Code    PT/OT Eval Status: Not ordered, pending course for input    Dispo - in 2-4 days    Joseph Wells MD

## 2019-04-18 NOTE — PLAN OF CARE
Problem: OXYGENATION/RESPIRATORY FUNCTION  Goal: Patient will maintain patent airway  Outcome: Ongoing  Note:   Patient's EF (Ejection Fraction) is greater than 40%    Patient has a past medical history of Anxiety, CAD (coronary artery disease), Cancer ([facility] [address] CHF (congestive heart failure) ([facility] [address] Depression, Diabetes mellitus ([facility] [address] Glaucoma, Hyperlipidemia, Hypertension, Macular degeneration, Thyroid disease, and TIA (transient ischemic attack). Comorbidities reviewed and education provided. Patient and family's stated goal of care: be more comfortable and reduce lower extremity edema prior to discharge    Patient's current functional capacity:  Marked limitation of physical activity. Comfortable at rest. Less than ordinary activity causes fatigue, palpitation, or dyspnea. Pt resting in bed at this time on room air. Pt denies shortness of breath. Pt with pitting lower extremity edema.  Patient's weights and intake/output reviewed:    Patient Vitals for the past 96 hrs (Last 3 readings):   Weight   04/17/19 1110 134 lb 4 oz (60.9 kg)   04/17/19 0103 147 lb (66.7 kg)       Intake/Output Summary (Last 24 hours) at 4/18/2019 0241  Last data filed at 4/17/2019 2124  Gross per 24 hour   Intake 880 ml   Output 1301 ml   Net -421 ml         >>For CHF and Comorbidity documentation on Education Time and Topics, please see Education Tab

## 2019-04-18 NOTE — PLAN OF CARE
Diabetes education provided today:    Physical activity: advised to exercise 5-7 days a week 30-60 mins at least. Discussed how it affects BS readings. Different diabetic medications. Managing high and low sugar readings. Rotation of sites for subcutaneous medication injection. Patient's EF (Ejection Fraction) is greater than 40%    Patient has a past medical history of Anxiety, CAD (coronary artery disease), Cancer ([place] [address] CHF (congestive heart failure) ([facility] [address] Depression, Diabetes mellitus ([facility] [address] Glaucoma, Hyperlipidemia, Hypertension, Macular degeneration, Thyroid disease, and TIA (transient ischemic attack). Comorbidities reviewed and education provided. Patient and family's stated goal of care: increase activity tolerance, better understand heart failure and disease management, be more comfortable and reduce lower extremity edema prior to discharge    Patient's current functional capacity:  Slight limitation of physical activity. Comfortable at rest. Ordinary physical activity results in fatigue, palpitation, dyspnea. Pt up in chair at this time on room air. Pt denies shortness of breath. Pt with pitting lower extremity edema.  Patient's weights and intake/output reviewed:    Patient Vitals for the past 96 hrs (Last 3 readings):   Weight   04/18/19 0605 134 lb 14.4 oz (61.2 kg)   04/17/19 1110 134 lb 4 oz (60.9 kg)   04/17/19 0103 147 lb (66.7 kg)       Intake/Output Summary (Last 24 hours) at 4/18/2019 1943  Last data filed at 4/18/2019 1635  Gross per 24 hour   Intake 1320 ml   Output 1450 ml   Net -130 ml         >>For CHF and Comorbidity documentation on Education Time and Topics, please see Education Tab

## 2019-04-18 NOTE — PROGRESS NOTES
End of shift report given to Leticia Cortez RN at bedside. Patient alert and oriented. Bed in lowest position with wheels locked. Call light within reach. No further needs at this time.

## 2019-04-19 LAB
ANION GAP SERPL CALCULATED.3IONS-SCNC: 9 MMOL/L (ref 3–16)
BUN BLDV-MCNC: 19 MG/DL (ref 7–20)
CALCIUM SERPL-MCNC: 9.1 MG/DL (ref 8.3–10.6)
CHLORIDE BLD-SCNC: 93 MMOL/L (ref 99–110)
CO2: 34 MMOL/L (ref 21–32)
CREAT SERPL-MCNC: 1.3 MG/DL (ref 0.6–1.2)
GFR AFRICAN AMERICAN: 46
GFR NON-AFRICAN AMERICAN: 38
GLUCOSE BLD-MCNC: 102 MG/DL (ref 70–99)
GLUCOSE BLD-MCNC: 114 MG/DL (ref 70–99)
GLUCOSE BLD-MCNC: 123 MG/DL (ref 70–99)
GLUCOSE BLD-MCNC: 130 MG/DL (ref 70–99)
GLUCOSE BLD-MCNC: 142 MG/DL (ref 70–99)
GLUCOSE BLD-MCNC: 151 MG/DL (ref 70–99)
GLUCOSE BLD-MCNC: 294 MG/DL (ref 70–99)
GLUCOSE BLD-MCNC: 54 MG/DL (ref 70–99)
MAGNESIUM: 2.1 MG/DL (ref 1.8–2.4)
PERFORMED ON: ABNORMAL
POTASSIUM SERPL-SCNC: 3.9 MMOL/L (ref 3.5–5.1)
PRO-BNP: 1948 PG/ML (ref 0–449)
SODIUM BLD-SCNC: 136 MMOL/L (ref 136–145)

## 2019-04-19 PROCEDURE — 80048 BASIC METABOLIC PNL TOTAL CA: CPT

## 2019-04-19 PROCEDURE — 6360000002 HC RX W HCPCS: Performed by: INTERNAL MEDICINE

## 2019-04-19 PROCEDURE — 83735 ASSAY OF MAGNESIUM: CPT

## 2019-04-19 PROCEDURE — 36415 COLL VENOUS BLD VENIPUNCTURE: CPT

## 2019-04-19 PROCEDURE — 94640 AIRWAY INHALATION TREATMENT: CPT

## 2019-04-19 PROCEDURE — APPNB30 APP NON BILLABLE TIME 0-30 MINS: Performed by: PHYSICIAN ASSISTANT

## 2019-04-19 PROCEDURE — 97530 THERAPEUTIC ACTIVITIES: CPT

## 2019-04-19 PROCEDURE — 1200000000 HC SEMI PRIVATE

## 2019-04-19 PROCEDURE — 6370000000 HC RX 637 (ALT 250 FOR IP): Performed by: INTERNAL MEDICINE

## 2019-04-19 PROCEDURE — 97110 THERAPEUTIC EXERCISES: CPT

## 2019-04-19 PROCEDURE — 2580000003 HC RX 258: Performed by: INTERNAL MEDICINE

## 2019-04-19 PROCEDURE — 83880 ASSAY OF NATRIURETIC PEPTIDE: CPT

## 2019-04-19 RX ORDER — PROCHLORPERAZINE EDISYLATE 5 MG/ML
10 INJECTION INTRAMUSCULAR; INTRAVENOUS EVERY 6 HOURS PRN
Status: DISCONTINUED | OUTPATIENT
Start: 2019-04-19 | End: 2019-04-25 | Stop reason: HOSPADM

## 2019-04-19 RX ORDER — FUROSEMIDE 40 MG/1
40 TABLET ORAL DAILY
Status: DISCONTINUED | OUTPATIENT
Start: 2019-04-19 | End: 2019-04-22

## 2019-04-19 RX ADMIN — CITALOPRAM HYDROBROMIDE 20 MG: 20 TABLET ORAL at 10:54

## 2019-04-19 RX ADMIN — PANTOPRAZOLE SODIUM 40 MG: 40 TABLET, DELAYED RELEASE ORAL at 10:54

## 2019-04-19 RX ADMIN — INSULIN LISPRO 1 UNITS: 100 INJECTION, SOLUTION INTRAVENOUS; SUBCUTANEOUS at 12:48

## 2019-04-19 RX ADMIN — CETIRIZINE HYDROCHLORIDE 5 MG: 5 TABLET ORAL at 10:54

## 2019-04-19 RX ADMIN — Medication 2 PUFF: at 20:00

## 2019-04-19 RX ADMIN — GABAPENTIN 100 MG: 100 CAPSULE ORAL at 10:54

## 2019-04-19 RX ADMIN — OXYCODONE AND ACETAMINOPHEN 1 TABLET: 5; 325 TABLET ORAL at 10:52

## 2019-04-19 RX ADMIN — FUROSEMIDE 40 MG: 40 TABLET ORAL at 10:54

## 2019-04-19 RX ADMIN — OXAZEPAM 15 MG: 15 CAPSULE ORAL at 11:08

## 2019-04-19 RX ADMIN — FERROUS SULFATE TAB 325 MG (65 MG ELEMENTAL FE) 325 MG: 325 (65 FE) TAB at 11:01

## 2019-04-19 RX ADMIN — DORZOLAMIDE HYDROCHLORIDE 1 DROP: 20 SOLUTION/ DROPS OPHTHALMIC at 21:56

## 2019-04-19 RX ADMIN — GABAPENTIN 100 MG: 100 CAPSULE ORAL at 21:43

## 2019-04-19 RX ADMIN — OXYCODONE AND ACETAMINOPHEN 1 TABLET: 5; 325 TABLET ORAL at 15:16

## 2019-04-19 RX ADMIN — POTASSIUM BICARBONATE 20 MEQ: 782 TABLET, EFFERVESCENT ORAL at 10:55

## 2019-04-19 RX ADMIN — CALCITRIOL 0.25 MCG: 0.25 CAPSULE ORAL at 10:54

## 2019-04-19 RX ADMIN — OXAZEPAM 15 MG: 15 CAPSULE ORAL at 14:51

## 2019-04-19 RX ADMIN — INSULIN LISPRO 3 UNITS: 100 INJECTION, SOLUTION INTRAVENOUS; SUBCUTANEOUS at 17:52

## 2019-04-19 RX ADMIN — DORZOLAMIDE HYDROCHLORIDE 1 DROP: 20 SOLUTION/ DROPS OPHTHALMIC at 11:00

## 2019-04-19 RX ADMIN — Medication 10 ML: at 21:43

## 2019-04-19 RX ADMIN — ATORVASTATIN CALCIUM 80 MG: 80 TABLET, FILM COATED ORAL at 21:42

## 2019-04-19 RX ADMIN — FERROUS SULFATE TAB 325 MG (65 MG ELEMENTAL FE) 325 MG: 325 (65 FE) TAB at 17:52

## 2019-04-19 RX ADMIN — INSULIN LISPRO 6 UNITS: 100 INJECTION, SOLUTION INTRAVENOUS; SUBCUTANEOUS at 17:53

## 2019-04-19 RX ADMIN — ENOXAPARIN SODIUM 30 MG: 30 INJECTION SUBCUTANEOUS at 10:55

## 2019-04-19 RX ADMIN — ASPIRIN 325 MG: 325 TABLET, COATED ORAL at 10:54

## 2019-04-19 RX ADMIN — LATANOPROST 1 DROP: 50 SOLUTION OPHTHALMIC at 21:56

## 2019-04-19 RX ADMIN — PANTOPRAZOLE SODIUM 40 MG: 40 TABLET, DELAYED RELEASE ORAL at 21:43

## 2019-04-19 RX ADMIN — POTASSIUM BICARBONATE 20 MEQ: 782 TABLET, EFFERVESCENT ORAL at 21:42

## 2019-04-19 RX ADMIN — OXYCODONE AND ACETAMINOPHEN 1 TABLET: 5; 325 TABLET ORAL at 23:46

## 2019-04-19 RX ADMIN — LEVOTHYROXINE SODIUM 100 MCG: 100 TABLET ORAL at 06:54

## 2019-04-19 RX ADMIN — OXYCODONE AND ACETAMINOPHEN 1 TABLET: 5; 325 TABLET ORAL at 19:21

## 2019-04-19 RX ADMIN — Medication 10 ML: at 10:57

## 2019-04-19 RX ADMIN — TRAZODONE HYDROCHLORIDE 150 MG: 50 TABLET ORAL at 23:47

## 2019-04-19 RX ADMIN — OXAZEPAM 15 MG: 15 CAPSULE ORAL at 22:13

## 2019-04-19 ASSESSMENT — PAIN DESCRIPTION - PAIN TYPE
TYPE: ACUTE PAIN

## 2019-04-19 ASSESSMENT — PAIN DESCRIPTION - LOCATION
LOCATION: ARM

## 2019-04-19 ASSESSMENT — PAIN DESCRIPTION - ORIENTATION
ORIENTATION: LEFT

## 2019-04-19 ASSESSMENT — PAIN DESCRIPTION - FREQUENCY
FREQUENCY: CONTINUOUS

## 2019-04-19 ASSESSMENT — PAIN DESCRIPTION - DESCRIPTORS
DESCRIPTORS: DISCOMFORT

## 2019-04-19 ASSESSMENT — PAIN SCALES - GENERAL
PAINLEVEL_OUTOF10: 10
PAINLEVEL_OUTOF10: 9
PAINLEVEL_OUTOF10: 8
PAINLEVEL_OUTOF10: 9
PAINLEVEL_OUTOF10: 9
PAINLEVEL_OUTOF10: 10

## 2019-04-19 ASSESSMENT — PAIN DESCRIPTION - ONSET
ONSET: ON-GOING

## 2019-04-19 ASSESSMENT — PAIN - FUNCTIONAL ASSESSMENT: PAIN_FUNCTIONAL_ASSESSMENT: PREVENTS OR INTERFERES SOME ACTIVE ACTIVITIES AND ADLS

## 2019-04-19 NOTE — PLAN OF CARE
Pt educated on the importance of a carb control diet. Monitoring pt's blood glucose AC/HS. Sliding scale insulin given as ordered according to pt's blood glucose. Will continue to monitor.

## 2019-04-19 NOTE — PROGRESS NOTES
Called Ortho team regarding pt's splint and family concerns. Rajat Lawrence stated she would put discharge instructions in but they would not proceed with surgical management and would see pt outpatient for splint management and frequent xray's. NWB on left arm but continue range of motion of fingers. Keep arm elevated if comfort allows. Continue pain management regimen and swelling is to be expected per ortho team. Pt remains able to move fingers on left arm with less than 3 second capillary refill and +2 radial pulse. Will continue to monitor frequently.

## 2019-04-19 NOTE — CONSULTS
1/9/19   Nadia Cruz MD   lidocaine (LIDODERM) 5 % Place 2 patches onto the skin daily 12 hours on, 12 hours off. 12/6/18   Ana Laura Shepard DO   gabapentin (NEURONTIN) 100 MG capsule Take 1 capsule by mouth 2 times daily for 30 days. .  Patient taking differently: Take 100 mg by mouth 2 times daily. Unsure of dose but takes 3 times a day. 6/25/18 1/4/19  Lazara Cartwright MD   mometasone-formoterol [facility]) 100-5 MCG/ACT inhaler Inhale 2 puffs into the lungs 2 times daily as needed    Historical Provider, MD   dorzolamide (TRUSOPT) 2 % ophthalmic solution Place 1 drop into both eyes 2 times daily    Historical Provider, MD   calcitRIOL (ROCALTROL) 0.25 MCG capsule Take 0.25 mcg by mouth See Admin Instructions Takes only on tuesday and friday    Historical Provider, MD   traZODone (DESYREL) 50 MG tablet Take 150 mg by mouth nightly     Historical Provider, MD   esomeprazole Magnesium (NEXIUM) 40 MG PACK Take 20 mg by mouth 2 times daily     Historical Provider, MD   citalopram (CELEXA) 20 MG tablet Take 20 mg by mouth daily    Historical Provider, MD   atorvastatin (LIPITOR) 80 MG tablet Take 80 mg by mouth nightly     Historical Provider, MD   levothyroxine (SYNTHROID) 100 MCG tablet Take 100 mcg by mouth daily    Historical Provider, MD   nitroGLYCERIN (NITROSTAT) 0.4 MG SL tablet Place 0.4 mg under the tongue every 5 minutes as needed for Chest pain    Historical Provider, MD   desloratadine (CLARINEX) 5 MG tablet Take 5 mg by mouth daily    Historical Provider, MD   bimatoprost (LUMIGAN) 0.01 % SOLN ophthalmic drops Place 1 drop into both eyes nightly    Historical Provider, MD   aspirin 325 MG tablet Take 325 mg by mouth daily    Historical Provider, MD   ferrous sulfate 325 (65 FE) MG tablet Take 325 mg by mouth 2 times daily (with meals)     Historical Provider, MD       Allergies:  Aciphex [rabeprazole]; Altace [ramipril]; Codeine; Cozaar [losartan]; Iodine;  Iv dye [iodides]; Sulfa antibiotics; and Ativan [lorazepam]    Social History:    Tobacco:  reports that she has quit smoking. She has never used smokeless tobacco.   Alcohol:  reports that she does not drink alcohol. Illicit Drug: No  Family History:   History reviewed. No pertinent family history. REVIEW OF SYSTEMS:    CONSTITUTIONAL:  negative  MUSCULOSKELETAL:  positive for  pain    PHYSICAL EXAM:    awake, alert, cooperative, no apparent distress, and appears stated age  MUSCULOSKELETAL:  There is no redness, warmth, or swelling of the joints. Full range of motion noted. Motor strength is 5 out of 5 all extremities bilaterally. Tone is normal.  She does have tenderness to palpation about the distal aspect of the humerus. She is in a posterior splint. There is minimal to no soft tissue swelling or skin compromise. She has excellent control of the hand however significant dorsal swelling has developed some mild stiffness    DATA:    CBC:   Recent Labs     04/17/19  0340 04/18/19  0721   WBC 6.2 6.3   HGB 12.9 12.8    220     BMP:    Recent Labs     04/17/19  0340 04/18/19  0721 04/19/19  0818    140 136   K 3.9 3.3* 3.9   CL 97* 96* 93*   CO2 31 35* 34*   BUN 17 15 19   CREATININE 1.0 1.0 1.3*   GLUCOSE 182* 114* 130*     INR: No results for input(s): INR in the last 72 hours. Radiology:   XR CHEST PORTABLE   Final Result   Mild heart failure with slightly improved pulmonary edema and unchanged left   pleural effusion. No acute osseous abnormality. XR HUMERUS LEFT (MIN 2 VIEWS)   Final Result   Distal humeral fracture, presumably pathologic as it occurs in an area   demonstrating subtle lytic irregularity suggesting underlying bone lesion. IMPRESSION/RECOMMENDATIONS:    Assessment: Pathologic fracture of the distal left humerus    Plan:  1) we had a long discussion with the patient and her daughter. This is an unfortunate and complicated clinical scenario.   Pathologic fractures such as these often do not heal

## 2019-04-19 NOTE — PROGRESS NOTES
Hospitalist Progress Note      PCP: Reema RUELAS    Date of Admission: 4/17/2019    Chief Complaint: Left arm pain, shortness of breath    Hospital Course:      Subjective:   Patient is up in bed, comfortable, not in distress. Denies any chest pain or shortness of breath. No new event overnight noted.        Medications:  Reviewed    Infusion Medications    dextrose       Scheduled Medications    furosemide  40 mg Oral Daily    potassium bicarb-citric acid  20 mEq Oral BID    furosemide  20 mg Intravenous Once    atorvastatin  80 mg Oral Nightly    aspirin  325 mg Oral Daily    latanoprost  1 drop Ophthalmic Daily    citalopram  20 mg Oral Daily    calcitRIOL  0.25 mcg Oral Once per day on Tue Fri    dorzolamide  1 drop Both Eyes BID    cetirizine  5 mg Oral Daily    traZODone  150 mg Oral Nightly    insulin lispro  6 Units Subcutaneous BID    insulin glargine  12 Units Subcutaneous Nightly    ferrous sulfate  325 mg Oral BID WC    pantoprazole  40 mg Oral BID    gabapentin  100 mg Oral BID    levothyroxine  100 mcg Oral Daily    mometasone-formoterol  2 puff Inhalation BID    sodium chloride flush  10 mL Intravenous 2 times per day    enoxaparin  30 mg Subcutaneous Daily    insulin lispro  0-6 Units Subcutaneous TID WC    insulin lispro  0-3 Units Subcutaneous Nightly    oxazepam  15 mg Oral TID     PRN Meds: prochlorperazine, nitroGLYCERIN, LORazepam, sodium chloride flush, magnesium hydroxide, glucose, dextrose, glucagon (rDNA), dextrose, oxyCODONE-acetaminophen      Intake/Output Summary (Last 24 hours) at 4/19/2019 1211  Last data filed at 4/19/2019 1206  Gross per 24 hour   Intake 830 ml   Output 1300 ml   Net -470 ml       Physical Exam Performed:    BP (!) 159/69   Pulse 99   Temp 97.8 °F (36.6 °C) (Oral)   Resp 16   Ht 5' 1\" (1.549 m)   Wt 135 lb 4.8 oz (61.4 kg)   SpO2 91%   BMI 25.56 kg/m²     General appearance: No apparent distress, appears stated age and cooperative. HEENT: Pupils equal, round, and reactive to light. Conjunctivae/corneas clear. Neck: Supple, with full range of motion. No jugular venous distention. Trachea midline. Respiratory:  Normal respiratory effort. Clear to auscultation, bilaterally without Rales/Wheezes/Rhonchi. Cardiovascular: Regular rate and rhythm with normal S1/S2 without murmurs, rubs or gallops. Abdomen: Soft, non-tender, non-distended with normal bowel sounds. Musculoskeletal: No clubbing, cyanosis or edema bilaterally. Left humerus fracture. Skin: Skin color, texture, turgor normal.  No rashes or lesions. Neurologic:  Neurovascularly intact without any focal sensory/motor deficits. Cranial nerves: II-XII intact, grossly non-focal.  Psychiatric: Alert and oriented, thought content appropriate, normal insight  Capillary Refill: Brisk,< 3 seconds   Peripheral Pulses: +2 palpable, equal bilaterally       Labs:   Recent Labs     04/17/19  0340 04/18/19  0721   WBC 6.2 6.3   HGB 12.9 12.8   HCT 38.8 37.6    220     Recent Labs     04/17/19  0340 04/18/19  0721 04/19/19  0818    140 136   K 3.9 3.3* 3.9   CL 97* 96* 93*   CO2 31 35* 34*   BUN 17 15 19   CREATININE 1.0 1.0 1.3*   CALCIUM 8.6 8.7 9.1     Recent Labs     04/17/19  0340   AST 12*   ALT 7*   BILITOT 0.5   ALKPHOS 75     No results for input(s): INR in the last 72 hours. Recent Labs     04/17/19  0340 04/17/19  1015 04/17/19  1419   TROPONINI <0.01 <0.01 <0.01       Urinalysis:      Lab Results   Component Value Date    NITRU Negative 01/06/2019    WBCUA 0-2 12/28/2018    BACTERIA 4+ 12/28/2018    RBCUA 0-2 12/28/2018    BLOODU Negative 01/06/2019    SPECGRAV 1.015 01/06/2019    GLUCOSEU Negative 01/06/2019       Radiology:  XR CHEST PORTABLE   Final Result   Mild heart failure with slightly improved pulmonary edema and unchanged left   pleural effusion. No acute osseous abnormality.          XR HUMERUS LEFT (MIN 2 VIEWS)   Final Result   Distal humeral fracture, presumably pathologic as it occurs in an area   demonstrating subtle lytic irregularity suggesting underlying bone lesion. Assessment/Plan:    Active Hospital Problems    Diagnosis Date Noted    Coronary artery disease involving native heart without angina pectoris [I25.10]      Priority: High    Acute on chronic diastolic congestive heart failure (HCC) [I50.33] 06/19/2018     Priority: High    Closed fracture of left humerus [S42.302A] 04/17/2019    Diabetes mellitus ([facility] [address] [E11.9] 01/04/2019    Benign essential HTN [I10] 01/04/2019     Acute and chronic diastolic heart failure, admit to telemetry, trend troponin, check 2-D echo with Doppler- showing normal LVEF grade 1 diastolic dysfunction, patient was started on IV Lasix 40 mg twice a day, monitor I's and O's, low-salt diet, cardiology consulted, monitor. Clinically improving, good urine output with Lasix, monitor renal panel, changing to Lasix by mouth.     Possible pathological fracture on left humerus, pain medication with Percocet as needed, orthopedic surgery consulted for further recommendation and management.     Coronary artery disease, no evidence of ACS, hemodynamically stable, continue current home regimen.     Diabetes mellitus type 2, controlled, continue current home regimen, add low-dose insulin sliding scale, check A1c, monitor.     Hypertension, controlled with current regimen, monitor.         DVT Prophylaxis: Lovenox  Diet: DIET CARB CONTROL; No Added Salt (3-4 GM)  Code Status: Full Code    PT/OT Eval Status: Ordered    Dispo - in 1-2 days    Pablo Montague MD

## 2019-04-19 NOTE — PROGRESS NOTES
Bedside report given to Jadyn ROBISON. Call light and bedside table within reach. No needs voiced at this time. PRN pain medication given at this time as ordered. Chair alarm on and in place.

## 2019-04-19 NOTE — PLAN OF CARE
Problem: OXYGENATION/RESPIRATORY FUNCTION  Goal: Patient will maintain patent airway  4/19/2019 0212 by Jhonny Holcomb RN  Outcome: Ongoing  Note:   Patient's EF (Ejection Fraction) is greater than 40%    Patient has a past medical history of Anxiety, CAD (coronary artery disease), Cancer ([facility] [address] CHF (congestive heart failure) ([facility] [address] Depression, [facility] mellitus ([facility] Glaucoma, Hyperlipidemia, Hypertension, Macular degeneration, Thyroid disease, and TIA (transient ischemic attack). Comorbidities reviewed and education provided. Patient and family's stated goal of care: better understand heart failure and disease management, be more comfortable and reduce lower extremity edema prior to discharge    Patient's current functional capacity:  Slight limitation of physical activity. Comfortable at rest. Ordinary physical activity results in fatigue, palpitation, dyspnea. Pt resting in bed at this time on room air. Pt denies shortness of breath. Pt with pitting lower extremity edema. Patient's weights and intake/output reviewed:    Patient Vitals for the past 96 hrs (Last 3 readings):   Weight   04/18/19 0605 134 lb 14.4 oz (61.2 kg)   04/17/19 1110 134 lb 4 oz (60.9 kg)   04/17/19 0103 147 lb (66.7 kg)       Intake/Output Summary (Last 24 hours) at 4/19/2019 0212  Last data filed at 4/18/2019 2014  Gross per 24 hour   Intake 1200 ml   Output 1250 ml   Net -50 ml         >>For CHF and Comorbidity documentation on Education Time and Topics, please see Education Tab      Problem: Serum Glucose Level - Abnormal:  Goal: Ability to maintain appropriate glucose levels will improve  Description  Ability to maintain appropriate glucose levels will improve  4/19/2019 0213 by Jhonny Holcomb RN  Outcome: Ongoing  Note:   Pt at risk for elevated blood glucose levels R/T DM. Pt will have glucose levels monitored prior to breakfast, lunch, dinner, and bedtime. Elevated levels will be treated via sliding scale instructions. Pt understands the need to monitor levels and has no further complaints at this time.

## 2019-04-19 NOTE — PLAN OF CARE
Problem: Pain:  Goal: Pain level will decrease  Description  Pain level will decrease  Outcome: Ongoing  Note:   Pain level currently controlled. Patient asleep. Will continue to monitor. Problem: Falls - Risk of:  Goal: Will remain free from falls  Description  Will remain free from falls  Outcome: Ongoing  Note:   Pt high fall risk. Instructed to use call light before getting out of bed. Call light within reach. Bed in low position. Bed alarm on. Patient free from falls and near-misses this shift. Will continue to monitor patient. Problem: Risk for Impaired Skin Integrity  Goal: Tissue integrity - skin and mucous membranes  Description  Structural intactness and normal physiological function of skin and  mucous membranes. Outcome: Ongoing  Note:   Patient's skin assessed. See flowsheet. Patient turned in bed. Pressure ulcer prevention in place. No issues with skin integrity this shift. Will continue to monitor.

## 2019-04-19 NOTE — CONSULTS
Patient's EF (Ejection Fraction) is 55%  Discharge Plans: SNF  Family Present: no  Advanced Directives: patient has advance directives scanned in the chart    Patient has a past medical history of Anxiety, CAD (coronary artery disease), Cancer ([address] CHF (congestive heart failure) ([facility] [address] Depression, Diabetes mellitus ([facility] [address] Glaucoma, Hyperlipidemia, Hypertension, Macular degeneration, Thyroid disease, and TIA (transient ischemic attack). Comorbidities reviewed and education provided. Lifestyle goal discussed: not discussed at this time. Pt is very [place] and unable to assess    Patient's current functional capacity: Pt is working with therapy. Unable to assess at this time      Patient Vitals for the past 96 hrs (Last 3 readings):   Weight   04/19/19 0613 135 lb 4.8 oz (61.4 kg)   04/18/19 0605 134 lb 14.4 oz (61.2 kg)   04/17/19 1110 134 lb 4 oz (60.9 kg)       Intake/Output Summary (Last 24 hours) at 4/19/2019 1334  Last data filed at 4/19/2019 1206  Gross per 24 hour   Intake 780 ml   Output 1300 ml   Net -520 ml       Patient provided with verbal and written education on CHF signs/symptoms, discharge medications, daily weights, low sodium diet, activity and follow-up. Heart Failure self-management education/written zone tool reviewed and encouraged to call at early signs of worsening CHF or when in yellow zone. Unable to do long education as therapy is working with pt. Sling on Left arm noted and pt will benefit from SNF at discharge. Instructed patient to call the doctor post discharge if they experience shortness of breath, chest pain, swelling, cough, or weight gain of 3 pounds in a day / 5 pounds in a week. Also, notified patient to call the doctor with dizziness, increased fatigue, decreased or difficulty urinating. Pt education needs reinforcement. No additional questions at this time. Education Time: 5 Minutes    Recommendations:  1. Encourage follow-up appointment compliance.  Next appointment: SNF  2. Educate further on fluid restriction 48 oz - 64 oz during inpatient stay so they can understand how to measure intake at home. 3. Review sodium restrictions. Encourage patient to not add table salt to their foods and avoid foods that are high in sodium. 4. Continue to educate on S/S. Stress the importance of calling the MD with the earliest signs of an acute exacerbation. 5. Emphasize daily weights - instruct patient to call the MD if they gain 2-3 lb in a day or 5 lb in a week. 6. Provided patient with CHF Resource Line for questions and concerns.      4/19/2019 1:34 PM

## 2019-04-19 NOTE — PROGRESS NOTES
VSS - afebrile. Pt is alert and oriented x 4 with no history of falls. Assessment completed as charted. Bed is in lowest position with 2/4 bed rails raised. Bed alarm turned on, wheels locked, and call light within reach. Patient wearing non-skid socks and verbalizes understanding to call out for assistance. No further requests at this time. Will continue to monitor patient.     Vitals:    04/18/19 2258   BP: (!) 147/79   Pulse: 83   Resp: 16   Temp: 97.8 °F (36.6 °C)   SpO2: 90%     Electronically signed by Eva Reeves RN on 4/19/2019 at 2:14 AM

## 2019-04-19 NOTE — PROGRESS NOTES
Perfect Serve to Cheryl Lopez CNP:    Patient's L. splint is cutting off circulation to hand; hand is triple size when compared to right. No note from ortho surgery or orders on care for splinted area. Please advise. Electronically signed by Jhonny Holcomb RN on 4/19/2019 at 12:42 AM     CNP at bedside; advised circulation is not compromised at this time. Arm is warm to touch and appropriately colored. CNP altered positioning of patient's arm while in sling to prevent further dependent edema and advised that splint, wrap, and/or sling will be changed in AM, pending attending and/or specialist physician recommendation. Will continue to monitor.     Electronically signed by Jhonny Holcomb RN on 4/19/2019 at 1:09 AM

## 2019-04-19 NOTE — PLAN OF CARE
Patient's EF (Ejection Fraction) is greater than 40%    Patient has a past medical history of Anxiety, CAD (coronary artery disease), Cancer ([address] [address] CHF (congestive heart failure) ([facility] [address] Depression, Diabetes mellitus ([facility] [address] Glaucoma, Hyperlipidemia, Hypertension, Macular degeneration, Thyroid disease, and TIA (transient ischemic attack). Comorbidities reviewed and education provided. Patient and family's stated goal of care: reduce shortness of breath, increase activity tolerance, better understand heart failure and disease management, be more comfortable and reduce lower extremity edema prior to discharge    Patient's current functional capacity:  Marked limitation of physical activity. Comfortable at rest. Less than ordinary activity causes fatigue, palpitation, or dyspnea. Pt resting in bed at this time on room air. Pt with complaints of shortness of breath. Pt with pitting lower extremity edema.  Patient's weights and intake/output reviewed:    Patient Vitals for the past 96 hrs (Last 3 readings):   Weight   04/19/19 0613 135 lb 4.8 oz (61.4 kg)   04/18/19 0605 134 lb 14.4 oz (61.2 kg)   04/17/19 1110 134 lb 4 oz (60.9 kg)       Intake/Output Summary (Last 24 hours) at 4/19/2019 1701  Last data filed at 4/19/2019 1626  Gross per 24 hour   Intake 240 ml   Output 650 ml   Net -410 ml         >>For CHF and Comorbidity documentation on Education Time and Topics, please see Education Tab

## 2019-04-19 NOTE — PROGRESS NOTES
Occupational Therapy  Facility/Department: [facility] A2 CARD TELEMETRY  Daily Treatment Note  NAME: Raissa Doe  : 1928  MRN: [id number]    Date of Service: 2019    Discharge Recommendations:  Subacute/Skilled Nursing Facility       Assessment   Performance deficits / Impairments: Decreased functional mobility ; Decreased ADL status; Decreased endurance;Decreased balance;Decreased high-level IADLs;Decreased safe awareness  Patient Education: role of OT, transfers, safety   REQUIRES OT FOLLOW UP: Yes  Activity Tolerance  Activity Tolerance: Patient limited by pain  Safety Devices  Safety Devices in place: Yes  Type of devices: Call light within reach; Left in chair;Nurse notified; Chair alarm in place         Patient Diagnosis(es): The primary encounter diagnosis was Other closed nondisplaced fracture of distal end of left humerus, initial encounter. Diagnoses of Fall, initial encounter and Acute on chronic congestive heart failure, unspecified heart failure type [facility]) were also pertinent to this visit. has a past medical history of Anxiety, CAD (coronary artery disease), Cancer ([facility] [address] CHF (congestive heart failure) ([facility] [address] Depression, Diabetes mellitus ([facility] [address] Glaucoma, Hyperlipidemia, Hypertension, Macular degeneration, Thyroid disease, and TIA (transient ischemic attack). has a past surgical history that includes Cholecystectomy; Cardiac surgery; Thyroidectomy; Spine surgery; and Coronary artery bypass graft.     Restrictions  Restrictions/Precautions  Restrictions/Precautions: Weight Bearing, Fall Risk  Required Braces or Orthoses?: Yes  Upper Extremity Weight Bearing Restrictions  Right Upper Extremity Weight Bearing: Non Weight Bearing  Required Braces or Orthoses    Left Upper Extremity Brace/Splint: L long arm splint  Position Activity Restriction  Other position/activity restrictions: High fall risk, Up as tolerated  Subjective   General  Chart Reviewed: Yes  Patient assessed for rehabilitation Inpatient Daily Activity Raw Score: 9  AM-PAC Inpatient ADL T-Scale Score : 25.33  ADL Inpatient CMS 0-100% Score: 79.59  ADL Inpatient CMS G-Code Modifier : CL    Goals  Short term goals  Time Frame for Short term goals: for 1 week or otherwise specified  Short term goal 1: Perform UE dressing with min A by 4/23  Short term goal 2: Perform functional transfer to chair/commode with CGA by 4/19  Short term goal 3: Perform toileting with mod A  Patient Goals   Patient goals : Pt did not state       Therapy Time   Individual Concurrent Group Co-treatment   Time In 1306         Time Out 1331         [facility] [address] [facility]

## 2019-04-20 LAB
ANION GAP SERPL CALCULATED.3IONS-SCNC: 9 MMOL/L (ref 3–16)
BUN BLDV-MCNC: 22 MG/DL (ref 7–20)
CALCIUM SERPL-MCNC: 8.8 MG/DL (ref 8.3–10.6)
CHLORIDE BLD-SCNC: 93 MMOL/L (ref 99–110)
CO2: 34 MMOL/L (ref 21–32)
CREAT SERPL-MCNC: 1.3 MG/DL (ref 0.6–1.2)
GFR AFRICAN AMERICAN: 46
GFR NON-AFRICAN AMERICAN: 38
GLUCOSE BLD-MCNC: 126 MG/DL (ref 70–99)
GLUCOSE BLD-MCNC: 146 MG/DL (ref 70–99)
GLUCOSE BLD-MCNC: 155 MG/DL (ref 70–99)
GLUCOSE BLD-MCNC: 168 MG/DL (ref 70–99)
GLUCOSE BLD-MCNC: 202 MG/DL (ref 70–99)
GLUCOSE BLD-MCNC: 335 MG/DL (ref 70–99)
MAGNESIUM: 2 MG/DL (ref 1.8–2.4)
PERFORMED ON: ABNORMAL
POTASSIUM SERPL-SCNC: 4.1 MMOL/L (ref 3.5–5.1)
SODIUM BLD-SCNC: 136 MMOL/L (ref 136–145)

## 2019-04-20 PROCEDURE — 1200000000 HC SEMI PRIVATE

## 2019-04-20 PROCEDURE — 6370000000 HC RX 637 (ALT 250 FOR IP): Performed by: INTERNAL MEDICINE

## 2019-04-20 PROCEDURE — 2700000000 HC OXYGEN THERAPY PER DAY

## 2019-04-20 PROCEDURE — 94761 N-INVAS EAR/PLS OXIMETRY MLT: CPT

## 2019-04-20 PROCEDURE — 80048 BASIC METABOLIC PNL TOTAL CA: CPT

## 2019-04-20 PROCEDURE — 6360000002 HC RX W HCPCS: Performed by: INTERNAL MEDICINE

## 2019-04-20 PROCEDURE — APPNB30 APP NON BILLABLE TIME 0-30 MINS: Performed by: PHYSICIAN ASSISTANT

## 2019-04-20 PROCEDURE — 83735 ASSAY OF MAGNESIUM: CPT

## 2019-04-20 PROCEDURE — 36415 COLL VENOUS BLD VENIPUNCTURE: CPT

## 2019-04-20 PROCEDURE — 94640 AIRWAY INHALATION TREATMENT: CPT

## 2019-04-20 PROCEDURE — 2580000003 HC RX 258: Performed by: INTERNAL MEDICINE

## 2019-04-20 RX ORDER — OXYCODONE HYDROCHLORIDE AND ACETAMINOPHEN 5; 325 MG/1; MG/1
2 TABLET ORAL EVERY 4 HOURS PRN
Status: DISCONTINUED | OUTPATIENT
Start: 2019-04-20 | End: 2019-04-25 | Stop reason: HOSPADM

## 2019-04-20 RX ORDER — OXYCODONE HYDROCHLORIDE AND ACETAMINOPHEN 5; 325 MG/1; MG/1
1 TABLET ORAL EVERY 4 HOURS PRN
Status: DISCONTINUED | OUTPATIENT
Start: 2019-04-20 | End: 2019-04-25 | Stop reason: HOSPADM

## 2019-04-20 RX ADMIN — FERROUS SULFATE TAB 325 MG (65 MG ELEMENTAL FE) 325 MG: 325 (65 FE) TAB at 18:20

## 2019-04-20 RX ADMIN — ATORVASTATIN CALCIUM 80 MG: 80 TABLET, FILM COATED ORAL at 21:03

## 2019-04-20 RX ADMIN — GABAPENTIN 100 MG: 100 CAPSULE ORAL at 09:43

## 2019-04-20 RX ADMIN — LEVOTHYROXINE SODIUM 100 MCG: 100 TABLET ORAL at 05:05

## 2019-04-20 RX ADMIN — ASPIRIN 325 MG: 325 TABLET, COATED ORAL at 09:42

## 2019-04-20 RX ADMIN — Medication 10 ML: at 09:49

## 2019-04-20 RX ADMIN — Medication 2 PUFF: at 19:46

## 2019-04-20 RX ADMIN — GABAPENTIN 100 MG: 100 CAPSULE ORAL at 21:03

## 2019-04-20 RX ADMIN — PANTOPRAZOLE SODIUM 40 MG: 40 TABLET, DELAYED RELEASE ORAL at 21:03

## 2019-04-20 RX ADMIN — PANTOPRAZOLE SODIUM 40 MG: 40 TABLET, DELAYED RELEASE ORAL at 09:42

## 2019-04-20 RX ADMIN — FUROSEMIDE 40 MG: 40 TABLET ORAL at 09:43

## 2019-04-20 RX ADMIN — CETIRIZINE HYDROCHLORIDE 5 MG: 5 TABLET ORAL at 09:43

## 2019-04-20 RX ADMIN — Medication 2 PUFF: at 08:29

## 2019-04-20 RX ADMIN — LATANOPROST 1 DROP: 50 SOLUTION OPHTHALMIC at 21:03

## 2019-04-20 RX ADMIN — OXAZEPAM 15 MG: 15 CAPSULE ORAL at 16:03

## 2019-04-20 RX ADMIN — OXYCODONE AND ACETAMINOPHEN 2 TABLET: 5; 325 TABLET ORAL at 22:14

## 2019-04-20 RX ADMIN — INSULIN GLARGINE 12 UNITS: 100 INJECTION, SOLUTION SUBCUTANEOUS at 21:03

## 2019-04-20 RX ADMIN — OXYCODONE AND ACETAMINOPHEN 2 TABLET: 5; 325 TABLET ORAL at 14:55

## 2019-04-20 RX ADMIN — DORZOLAMIDE HYDROCHLORIDE 1 DROP: 20 SOLUTION/ DROPS OPHTHALMIC at 21:03

## 2019-04-20 RX ADMIN — INSULIN LISPRO 2 UNITS: 100 INJECTION, SOLUTION INTRAVENOUS; SUBCUTANEOUS at 18:21

## 2019-04-20 RX ADMIN — OXYCODONE AND ACETAMINOPHEN 1 TABLET: 5; 325 TABLET ORAL at 09:42

## 2019-04-20 RX ADMIN — ENOXAPARIN SODIUM 30 MG: 30 INJECTION SUBCUTANEOUS at 09:42

## 2019-04-20 RX ADMIN — INSULIN LISPRO 6 UNITS: 100 INJECTION, SOLUTION INTRAVENOUS; SUBCUTANEOUS at 09:49

## 2019-04-20 RX ADMIN — INSULIN LISPRO 6 UNITS: 100 INJECTION, SOLUTION INTRAVENOUS; SUBCUTANEOUS at 18:21

## 2019-04-20 RX ADMIN — CITALOPRAM HYDROBROMIDE 20 MG: 20 TABLET ORAL at 09:43

## 2019-04-20 RX ADMIN — INSULIN LISPRO 4 UNITS: 100 INJECTION, SOLUTION INTRAVENOUS; SUBCUTANEOUS at 14:55

## 2019-04-20 RX ADMIN — OXAZEPAM 15 MG: 15 CAPSULE ORAL at 09:42

## 2019-04-20 RX ADMIN — TRAZODONE HYDROCHLORIDE 150 MG: 50 TABLET ORAL at 22:26

## 2019-04-20 RX ADMIN — OXAZEPAM 15 MG: 15 CAPSULE ORAL at 22:47

## 2019-04-20 RX ADMIN — POTASSIUM BICARBONATE 20 MEQ: 782 TABLET, EFFERVESCENT ORAL at 09:43

## 2019-04-20 RX ADMIN — POTASSIUM BICARBONATE 20 MEQ: 782 TABLET, EFFERVESCENT ORAL at 21:03

## 2019-04-20 RX ADMIN — OXYCODONE AND ACETAMINOPHEN 1 TABLET: 5; 325 TABLET ORAL at 05:05

## 2019-04-20 RX ADMIN — INSULIN LISPRO 1 UNITS: 100 INJECTION, SOLUTION INTRAVENOUS; SUBCUTANEOUS at 09:48

## 2019-04-20 RX ADMIN — DORZOLAMIDE HYDROCHLORIDE 1 DROP: 20 SOLUTION/ DROPS OPHTHALMIC at 09:47

## 2019-04-20 RX ADMIN — FERROUS SULFATE TAB 325 MG (65 MG ELEMENTAL FE) 325 MG: 325 (65 FE) TAB at 09:43

## 2019-04-20 ASSESSMENT — PAIN DESCRIPTION - PROGRESSION
CLINICAL_PROGRESSION: NOT CHANGED

## 2019-04-20 ASSESSMENT — PAIN SCALES - GENERAL
PAINLEVEL_OUTOF10: 8
PAINLEVEL_OUTOF10: 7
PAINLEVEL_OUTOF10: 9
PAINLEVEL_OUTOF10: 8
PAINLEVEL_OUTOF10: 9
PAINLEVEL_OUTOF10: 10
PAINLEVEL_OUTOF10: 0
PAINLEVEL_OUTOF10: 0
PAINLEVEL_OUTOF10: 7
PAINLEVEL_OUTOF10: 9
PAINLEVEL_OUTOF10: 7

## 2019-04-20 ASSESSMENT — PAIN DESCRIPTION - ONSET
ONSET: ON-GOING
ONSET: ON-GOING

## 2019-04-20 ASSESSMENT — PAIN DESCRIPTION - LOCATION
LOCATION: ARM

## 2019-04-20 ASSESSMENT — PAIN DESCRIPTION - FREQUENCY
FREQUENCY: CONTINUOUS
FREQUENCY: CONTINUOUS

## 2019-04-20 ASSESSMENT — PAIN DESCRIPTION - PAIN TYPE
TYPE: ACUTE PAIN
TYPE: ACUTE PAIN

## 2019-04-20 ASSESSMENT — PAIN DESCRIPTION - DESCRIPTORS
DESCRIPTORS: DISCOMFORT
DESCRIPTORS: DISCOMFORT

## 2019-04-20 ASSESSMENT — PAIN DESCRIPTION - ORIENTATION
ORIENTATION: LEFT

## 2019-04-20 NOTE — PLAN OF CARE
Pt has maintained adequate oxygenation throughout the shift, I did have to place oxygen on the patient 2L.      Pt has required insulin throughout the shift

## 2019-04-20 NOTE — PROGRESS NOTES
distress, appears stated age and cooperative. HEENT: Pupils equal, round, and reactive to light. Conjunctivae/corneas clear. Neck: Supple, with full range of motion. No jugular venous distention. Trachea midline. Respiratory:  Normal respiratory effort. Clear to auscultation, bilaterally without Rales/Wheezes/Rhonchi. Cardiovascular: Regular rate and rhythm with normal S1/S2 without murmurs, rubs or gallops. Abdomen: Soft, non-tender, non-distended with normal bowel sounds. Musculoskeletal: No clubbing, cyanosis or edema bilaterally. Left humerus fracture. Skin: Skin color, texture, turgor normal.  No rashes or lesions. Neurologic:  Neurovascularly intact without any focal sensory/motor deficits. Cranial nerves: II-XII intact, grossly non-focal.  Psychiatric: Alert and oriented, thought content appropriate, normal insight  Capillary Refill: Brisk,< 3 seconds   Peripheral Pulses: +2 palpable, equal bilaterally       Labs:   Recent Labs     04/18/19  0721   WBC 6.3   HGB 12.8   HCT 37.6        Recent Labs     04/18/19  0721 04/19/19  0818 04/20/19  0636    136 136   K 3.3* 3.9 4.1   CL 96* 93* 93*   CO2 35* 34* 34*   BUN 15 19 22*   CREATININE 1.0 1.3* 1.3*   CALCIUM 8.7 9.1 8.8     No results for input(s): AST, ALT, BILIDIR, BILITOT, ALKPHOS in the last 72 hours. No results for input(s): INR in the last 72 hours. Recent Labs     04/17/19  1419   TROPONINI <0.01       Urinalysis:      Lab Results   Component Value Date    NITRU Negative 01/06/2019    WBCUA 0-2 12/28/2018    BACTERIA 4+ 12/28/2018    RBCUA 0-2 12/28/2018    BLOODU Negative 01/06/2019    SPECGRAV 1.015 01/06/2019    GLUCOSEU Negative 01/06/2019       Radiology:  XR CHEST PORTABLE   Final Result   Mild heart failure with slightly improved pulmonary edema and unchanged left   pleural effusion. No acute osseous abnormality.          XR HUMERUS LEFT (MIN 2 VIEWS)   Final Result   Distal humeral fracture, presumably pathologic as it occurs in an area   demonstrating subtle lytic irregularity suggesting underlying bone lesion. Assessment/Plan:    Active Hospital Problems    Diagnosis Date Noted    Coronary artery disease involving native heart without angina pectoris [I25.10]      Priority: High    Acute on chronic diastolic congestive heart failure (HCC) [I50.33] 06/19/2018     Priority: High    Closed fracture of left humerus [S42.302A] 04/17/2019    Diabetes mellitus ([facility] [address] [E11.9] 01/04/2019    Benign essential HTN [I10] 01/04/2019     Acute and chronic diastolic heart failure, admit to telemetry, trend troponin, check 2-D echo with Doppler- showing normal LVEF grade 1 diastolic dysfunction, patient was started on IV Lasix 40 mg twice a day, monitor I's and O's, low-salt diet, cardiology consulted, monitor. Clinically improving, good urine output with Lasix, monitor renal panel, changing to Lasix by mouth. Clinically remained stable.     Possible pathological fracture on left humerus, pain medication with Percocet as needed, orthopedic surgery consulted for further recommendation and management. No plan for surgical intervention, patient may have metastases from possibly breast carcinoma, not sure how extensive workup and subsequent management she can tolerate. History of breast carcinoma, long time ago, under remission. Concern for possible recurrence and metastatic pathological fracture of left humerus as above. Not sure how extensive further investigation, workup and therapeutic approach she can tolerate.     Coronary artery disease, no evidence of ACS, hemodynamically stable, continue current home regimen.     Diabetes mellitus type 2, controlled, continue current home regimen, add low-dose insulin sliding scale, check A1c, monitor.     Hypertension, controlled with current regimen, monitor.         DVT Prophylaxis: Lovenox  Diet: DIET CARB CONTROL; No Added Salt (3-4 GM)  Code Status: Full Code    PT/OT Eval Status: Ordered    Dispo - in 1-2 days    Sohail Tang MD

## 2019-04-20 NOTE — PROGRESS NOTES
Pt is complaining of pain, pulses are +2, cap refill is less than 3 seconds and color of extremity is within normal limits. Will continue to monitor. Paged [facility] for possible increase in pain meds.

## 2019-04-20 NOTE — PROGRESS NOTES
4 Eyes Skin Assessment     The patient is being assess for  Shift Handoff    I agree that 2 RN's have performed a thorough Head to Toe Skin Assessment on the patient. ALL assessment sites listed below have been assessed. Areas assessed by both nurses: Alise/Kitan  [x]   Head, Face, and Ears   [x]   Shoulders, Back, and Chest  [x]   Arms, Elbows, and Hands   [x]   Coccyx, Sacrum, and Ischum  [x]   Legs, Feet, and Heels        Does the Patient have Skin Breakdown?   No         [place] Prevention initiated:  Yes   Wound Care Orders initiated:  No      [facility] nurse consulted for Pressure Injury (Stage 3,4, Unstageable, DTI, NWPT, and Complex wounds):  No      Nurse 1 eSignature: Electronically signed by Abel Palafox RN on 4/20/19 at 7:27 AM    **SHARE this note so that the co-signing nurse is able to place an eSignature**    Nurse 2 eSignature: Electronically signed by Chantelle Barker RN on 4/20/19 at 7:43 AM

## 2019-04-20 NOTE — PROGRESS NOTES
Patient's EF (Ejection Fraction) is greater than 40%    Patient has a past medical history of Anxiety, CAD (coronary artery disease), Cancer ([place] [address] CHF (congestive heart failure) ([facility] [address] Depression, Diabetes mellitus ([facility] [address] Glaucoma, Hyperlipidemia, Hypertension, Macular degeneration, Thyroid disease, and TIA (transient ischemic attack). Comorbidities reviewed and education provided. Patient and family's stated goal of care: increase activity tolerance and better understand heart failure and disease management prior to discharge    Patient's current functional capacity:  Slight limitation of physical activity. Comfortable at rest. Ordinary physical activity results in fatigue, palpitation, dyspnea. Pt up in chair at this time on 2 L O2. Pt denies shortness of breath. Pt with pitting lower extremity edema.  Patient's weights and intake/output reviewed:    Patient Vitals for the past 96 hrs (Last 3 readings):   Weight   04/20/19 0656 132 lb 8 oz (60.1 kg)   04/19/19 0613 135 lb 4.8 oz (61.4 kg)   04/18/19 0605 134 lb 14.4 oz (61.2 kg)       Intake/Output Summary (Last 24 hours) at 4/20/2019 1800  Last data filed at 4/20/2019 5154  Gross per 24 hour   Intake 840 ml   Output 700 ml   Net 140 ml         >>For CHF and Comorbidity documentation on Education Time and Topics, please see Education Tab

## 2019-04-20 NOTE — PLAN OF CARE
Per pt, she refuses repositions at this time due to pain in arm. Educated pt on the need to reposition frequently, pt states understanding.

## 2019-04-21 LAB
ANION GAP SERPL CALCULATED.3IONS-SCNC: 9 MMOL/L (ref 3–16)
BUN BLDV-MCNC: 22 MG/DL (ref 7–20)
CALCIUM SERPL-MCNC: 8.6 MG/DL (ref 8.3–10.6)
CHLORIDE BLD-SCNC: 94 MMOL/L (ref 99–110)
CO2: 33 MMOL/L (ref 21–32)
CREAT SERPL-MCNC: 1.3 MG/DL (ref 0.6–1.2)
GFR AFRICAN AMERICAN: 46
GFR NON-AFRICAN AMERICAN: 38
GLUCOSE BLD-MCNC: 115 MG/DL (ref 70–99)
GLUCOSE BLD-MCNC: 129 MG/DL (ref 70–99)
GLUCOSE BLD-MCNC: 168 MG/DL (ref 70–99)
GLUCOSE BLD-MCNC: 178 MG/DL (ref 70–99)
GLUCOSE BLD-MCNC: 218 MG/DL (ref 70–99)
MAGNESIUM: 2 MG/DL (ref 1.8–2.4)
PERFORMED ON: ABNORMAL
POTASSIUM SERPL-SCNC: 4.6 MMOL/L (ref 3.5–5.1)
PRO-BNP: 844 PG/ML (ref 0–449)
SODIUM BLD-SCNC: 136 MMOL/L (ref 136–145)

## 2019-04-21 PROCEDURE — 94761 N-INVAS EAR/PLS OXIMETRY MLT: CPT

## 2019-04-21 PROCEDURE — 94640 AIRWAY INHALATION TREATMENT: CPT

## 2019-04-21 PROCEDURE — 80048 BASIC METABOLIC PNL TOTAL CA: CPT

## 2019-04-21 PROCEDURE — 2700000000 HC OXYGEN THERAPY PER DAY

## 2019-04-21 PROCEDURE — 1200000000 HC SEMI PRIVATE

## 2019-04-21 PROCEDURE — 36415 COLL VENOUS BLD VENIPUNCTURE: CPT

## 2019-04-21 PROCEDURE — 6360000002 HC RX W HCPCS: Performed by: INTERNAL MEDICINE

## 2019-04-21 PROCEDURE — 6370000000 HC RX 637 (ALT 250 FOR IP): Performed by: INTERNAL MEDICINE

## 2019-04-21 PROCEDURE — 83735 ASSAY OF MAGNESIUM: CPT

## 2019-04-21 PROCEDURE — 83880 ASSAY OF NATRIURETIC PEPTIDE: CPT

## 2019-04-21 RX ADMIN — OXAZEPAM 15 MG: 15 CAPSULE ORAL at 15:00

## 2019-04-21 RX ADMIN — INSULIN LISPRO 1 UNITS: 100 INJECTION, SOLUTION INTRAVENOUS; SUBCUTANEOUS at 17:00

## 2019-04-21 RX ADMIN — OXYCODONE AND ACETAMINOPHEN 2 TABLET: 5; 325 TABLET ORAL at 06:00

## 2019-04-21 RX ADMIN — ASPIRIN 325 MG: 325 TABLET, COATED ORAL at 08:45

## 2019-04-21 RX ADMIN — DORZOLAMIDE HYDROCHLORIDE 1 DROP: 20 SOLUTION/ DROPS OPHTHALMIC at 08:46

## 2019-04-21 RX ADMIN — ATORVASTATIN CALCIUM 80 MG: 80 TABLET, FILM COATED ORAL at 22:16

## 2019-04-21 RX ADMIN — CITALOPRAM HYDROBROMIDE 20 MG: 20 TABLET ORAL at 08:45

## 2019-04-21 RX ADMIN — POTASSIUM BICARBONATE 20 MEQ: 782 TABLET, EFFERVESCENT ORAL at 22:16

## 2019-04-21 RX ADMIN — DORZOLAMIDE HYDROCHLORIDE 1 DROP: 20 SOLUTION/ DROPS OPHTHALMIC at 22:15

## 2019-04-21 RX ADMIN — FERROUS SULFATE TAB 325 MG (65 MG ELEMENTAL FE) 325 MG: 325 (65 FE) TAB at 17:00

## 2019-04-21 RX ADMIN — Medication 2 PUFF: at 21:31

## 2019-04-21 RX ADMIN — FERROUS SULFATE TAB 325 MG (65 MG ELEMENTAL FE) 325 MG: 325 (65 FE) TAB at 08:45

## 2019-04-21 RX ADMIN — GABAPENTIN 100 MG: 100 CAPSULE ORAL at 22:16

## 2019-04-21 RX ADMIN — POTASSIUM BICARBONATE 20 MEQ: 782 TABLET, EFFERVESCENT ORAL at 08:45

## 2019-04-21 RX ADMIN — Medication 2 PUFF: at 08:05

## 2019-04-21 RX ADMIN — OXYCODONE AND ACETAMINOPHEN 2 TABLET: 5; 325 TABLET ORAL at 22:31

## 2019-04-21 RX ADMIN — INSULIN LISPRO 6 UNITS: 100 INJECTION, SOLUTION INTRAVENOUS; SUBCUTANEOUS at 08:47

## 2019-04-21 RX ADMIN — INSULIN LISPRO 1 UNITS: 100 INJECTION, SOLUTION INTRAVENOUS; SUBCUTANEOUS at 08:47

## 2019-04-21 RX ADMIN — TRAZODONE HYDROCHLORIDE 150 MG: 50 TABLET ORAL at 22:16

## 2019-04-21 RX ADMIN — PANTOPRAZOLE SODIUM 40 MG: 40 TABLET, DELAYED RELEASE ORAL at 22:16

## 2019-04-21 RX ADMIN — INSULIN LISPRO 1 UNITS: 100 INJECTION, SOLUTION INTRAVENOUS; SUBCUTANEOUS at 22:20

## 2019-04-21 RX ADMIN — LATANOPROST 1 DROP: 50 SOLUTION OPHTHALMIC at 22:15

## 2019-04-21 RX ADMIN — OXYCODONE AND ACETAMINOPHEN 2 TABLET: 5; 325 TABLET ORAL at 18:22

## 2019-04-21 RX ADMIN — FUROSEMIDE 40 MG: 40 TABLET ORAL at 08:45

## 2019-04-21 RX ADMIN — OXYCODONE AND ACETAMINOPHEN 2 TABLET: 5; 325 TABLET ORAL at 10:01

## 2019-04-21 RX ADMIN — ENOXAPARIN SODIUM 30 MG: 30 INJECTION SUBCUTANEOUS at 08:45

## 2019-04-21 RX ADMIN — OXAZEPAM 15 MG: 15 CAPSULE ORAL at 22:16

## 2019-04-21 RX ADMIN — OXAZEPAM 15 MG: 15 CAPSULE ORAL at 09:11

## 2019-04-21 RX ADMIN — GABAPENTIN 100 MG: 100 CAPSULE ORAL at 08:45

## 2019-04-21 RX ADMIN — INSULIN LISPRO 6 UNITS: 100 INJECTION, SOLUTION INTRAVENOUS; SUBCUTANEOUS at 17:00

## 2019-04-21 RX ADMIN — LEVOTHYROXINE SODIUM 100 MCG: 100 TABLET ORAL at 06:00

## 2019-04-21 RX ADMIN — INSULIN GLARGINE 12 UNITS: 100 INJECTION, SOLUTION SUBCUTANEOUS at 22:20

## 2019-04-21 RX ADMIN — PANTOPRAZOLE SODIUM 40 MG: 40 TABLET, DELAYED RELEASE ORAL at 08:45

## 2019-04-21 RX ADMIN — OXYCODONE AND ACETAMINOPHEN 2 TABLET: 5; 325 TABLET ORAL at 14:08

## 2019-04-21 RX ADMIN — CETIRIZINE HYDROCHLORIDE 5 MG: 5 TABLET ORAL at 08:45

## 2019-04-21 ASSESSMENT — PAIN SCALES - GENERAL
PAINLEVEL_OUTOF10: 10
PAINLEVEL_OUTOF10: 8
PAINLEVEL_OUTOF10: 9

## 2019-04-21 ASSESSMENT — PAIN DESCRIPTION - LOCATION: LOCATION: ARM

## 2019-04-21 ASSESSMENT — PAIN DESCRIPTION - ORIENTATION: ORIENTATION: LEFT

## 2019-04-21 ASSESSMENT — PAIN DESCRIPTION - PAIN TYPE: TYPE: ACUTE PAIN

## 2019-04-21 ASSESSMENT — PAIN DESCRIPTION - FREQUENCY: FREQUENCY: CONTINUOUS

## 2019-04-21 ASSESSMENT — PAIN DESCRIPTION - ONSET: ONSET: ON-GOING

## 2019-04-21 ASSESSMENT — PAIN DESCRIPTION - DESCRIPTORS: DESCRIPTORS: DISCOMFORT

## 2019-04-21 ASSESSMENT — PAIN DESCRIPTION - PROGRESSION: CLINICAL_PROGRESSION: NOT CHANGED

## 2019-04-21 NOTE — PLAN OF CARE
Problem: HEMODYNAMIC STATUS  Goal: Patient has stable vital signs and fluid balance  Intervention: MONITOR PATIENT'S WEIGHT  Note:   Pt instructed about the importance of taking PO lasix twice a day in order to rid her body of excess fluid. Pt also educated on the importance of weighing herself daily to see if there is any weight gain of 2-3lbs overnight or 5-7lbs in  week and to notify MD if there is. Patient's EF (Ejection Fraction) is greater than 40%    Patient has a past medical history of Anxiety, CAD (coronary artery disease), Cancer ([address] CHF (congestive heart failure) ([facility] [address] Depression, Diabetes mellitus ([facility] [address] Glaucoma, Hyperlipidemia, Hypertension, Macular degeneration, Thyroid disease, and TIA (transient ischemic attack). Comorbidities reviewed and education provided. Patient and family's stated goal of care: reduce shortness of breath, increase activity tolerance, better understand heart failure and disease management, be more comfortable and reduce lower extremity edema prior to discharge    Patient's current functional capacity:  Marked limitation of physical activity. Comfortable at rest. Less than ordinary activity causes fatigue, palpitation, or dyspnea. Pt resting in bed at this time on room air. Pt denies shortness of breath. Pt with nonpitting lower extremity edema. Patient's weights and intake/output reviewed:    Patient Vitals for the past 96 hrs (Last 3 readings):   Weight   04/20/19 0656 132 lb 8 oz (60.1 kg)   04/19/19 0613 135 lb 4.8 oz (61.4 kg)   04/18/19 0605 134 lb 14.4 oz (61.2 kg)       Intake/Output Summary (Last 24 hours) at 4/21/2019 0235  Last data filed at 4/20/2019 2202  Gross per 24 hour   Intake 960 ml   Output 750 ml   Net 210 ml         >>For CHF and Comorbidity documentation on Education Time and Topics, please see Education Tab    Educated on the importance of monitoring glucose level  and following carb control diet with the diagnosis of CHF.  Taught about signs and symptoms of hypo and hyperglycemia and when to seek medical attention if blood sugar is too high or too low.

## 2019-04-21 NOTE — PROGRESS NOTES
Hospitalist Progress Note      PCP: Alexander RUELAS    Date of Admission: 4/17/2019    Chief Complaint: Left arm pain, shortness of breath    Hospital Course:      Subjective:   Patient is up in bed, comfortable, not in distress. Denies any chest pain or shortness of breath. Complaining of pain on left arm. No new event overnight noted.        Medications:  Reviewed    Infusion Medications    dextrose       Scheduled Medications    furosemide  40 mg Oral Daily    potassium bicarb-citric acid  20 mEq Oral BID    furosemide  20 mg Intravenous Once    atorvastatin  80 mg Oral Nightly    aspirin  325 mg Oral Daily    latanoprost  1 drop Ophthalmic Daily    citalopram  20 mg Oral Daily    calcitRIOL  0.25 mcg Oral Once per day on Tue Fri    dorzolamide  1 drop Both Eyes BID    cetirizine  5 mg Oral Daily    traZODone  150 mg Oral Nightly    insulin lispro  6 Units Subcutaneous BID    insulin glargine  12 Units Subcutaneous Nightly    ferrous sulfate  325 mg Oral BID WC    pantoprazole  40 mg Oral BID    gabapentin  100 mg Oral BID    levothyroxine  100 mcg Oral Daily    mometasone-formoterol  2 puff Inhalation BID    sodium chloride flush  10 mL Intravenous 2 times per day    enoxaparin  30 mg Subcutaneous Daily    insulin lispro  0-6 Units Subcutaneous TID WC    insulin lispro  0-3 Units Subcutaneous Nightly    oxazepam  15 mg Oral TID     PRN Meds: oxyCODONE-acetaminophen, oxyCODONE-acetaminophen, prochlorperazine, nitroGLYCERIN, LORazepam, sodium chloride flush, magnesium hydroxide, glucose, dextrose, glucagon (rDNA), dextrose      Intake/Output Summary (Last 24 hours) at 4/21/2019 1043  Last data filed at 4/21/2019 0902  Gross per 24 hour   Intake 1660 ml   Output 1450 ml   Net 210 ml       Physical Exam Performed:    /67   Pulse 76   Temp 97.6 °F (36.4 °C) (Oral)   Resp 16   Ht 5' 1\" (1.549 m)   Wt 138 lb 1.6 oz (62.6 kg)   SpO2 91%   BMI 26.09 kg/m²     General appearance: No apparent distress, appears stated age and cooperative. HEENT: Pupils equal, round, and reactive to light. Conjunctivae/corneas clear. Neck: Supple, with full range of motion. No jugular venous distention. Trachea midline. Respiratory:  Normal respiratory effort. Clear to auscultation, bilaterally without Rales/Wheezes/Rhonchi. Cardiovascular: Regular rate and rhythm with normal S1/S2 without murmurs, rubs or gallops. Abdomen: Soft, non-tender, non-distended with normal bowel sounds. Musculoskeletal: No clubbing, cyanosis or edema bilaterally. Left humerus fracture. Skin: Skin color, texture, turgor normal.  No rashes or lesions. Neurologic:  Neurovascularly intact without any focal sensory/motor deficits. Cranial nerves: II-XII intact, grossly non-focal.  Psychiatric: Alert and oriented, thought content appropriate, normal insight  Capillary Refill: Brisk,< 3 seconds   Peripheral Pulses: +2 palpable, equal bilaterally       Labs:   No results for input(s): WBC, HGB, HCT, PLT in the last 72 hours. Recent Labs     04/19/19  0818 04/20/19  0636 04/21/19  0645    136 136   K 3.9 4.1 4.6   CL 93* 93* 94*   CO2 34* 34* 33*   BUN 19 22* 22*   CREATININE 1.3* 1.3* 1.3*   CALCIUM 9.1 8.8 8.6     No results for input(s): AST, ALT, BILIDIR, BILITOT, ALKPHOS in the last 72 hours. No results for input(s): INR in the last 72 hours. No results for input(s): Dareen Serge in the last 72 hours. Urinalysis:      Lab Results   Component Value Date    NITRU Negative 01/06/2019    WBCUA 0-2 12/28/2018    BACTERIA 4+ 12/28/2018    RBCUA 0-2 12/28/2018    BLOODU Negative 01/06/2019    SPECGRAV 1.015 01/06/2019    GLUCOSEU Negative 01/06/2019       Radiology:  XR CHEST PORTABLE   Final Result   Mild heart failure with slightly improved pulmonary edema and unchanged left   pleural effusion. No acute osseous abnormality.          XR HUMERUS LEFT (MIN 2 VIEWS)   Final Result   Distal humeral fracture, presumably pathologic as it occurs in an area   demonstrating subtle lytic irregularity suggesting underlying bone lesion. Assessment/Plan:    Active Hospital Problems    Diagnosis Date Noted    Coronary artery disease involving native heart without angina pectoris [I25.10]      Priority: High    Acute on chronic diastolic congestive heart failure (HCC) [I50.33] 06/19/2018     Priority: High    Closed fracture of left humerus [S42.302A] 04/17/2019    Diabetes mellitus ([facility] [address] [E11.9] 01/04/2019    Benign essential HTN [I10] 01/04/2019     Acute and chronic diastolic heart failure, admit to telemetry, trend troponin, check 2-D echo with Doppler- showing normal LVEF grade 1 diastolic dysfunction, patient was started on IV Lasix 40 mg twice a day, monitor I's and O's, low-salt diet, cardiology consulted, monitor. Clinically improving, good urine output with Lasix, monitor renal panel, changing to Lasix by mouth. Clinically remained stable.     Possible pathological fracture on left humerus, pain medication with Percocet as needed, orthopedic surgery consulted for further recommendation and management. No plan for surgical intervention, patient may have metastases from possibly breast carcinoma, not sure how extensive workup and subsequent management she can tolerate. History of breast carcinoma, long time ago, under remission. Concern for possible recurrence and metastatic pathological fracture of left humerus as above. Not sure how extensive further investigation, workup and therapeutic approach she can tolerate.     Coronary artery disease, no evidence of ACS, hemodynamically stable, continue current home regimen.     Diabetes mellitus type 2, controlled, continue current home regimen, add low-dose insulin sliding scale, monitor.     Hypertension, controlled with current regimen, monitor. Consider palliative care consult.     DVT Prophylaxis: Lovenox  Diet: DIET CARB CONTROL; No Added Salt (3-4 GM)  Code Status: Full Code    PT/OT Eval Status: Ordered    Dispo - in 1-2 days    Kevon Aldana MD

## 2019-04-21 NOTE — PLAN OF CARE
Patient's EF (Ejection Fraction) is greater than 40%    Patient has a past medical history of Anxiety, CAD (coronary artery disease), Cancer ([facility] [address] CHF (congestive heart failure) ([facility] [address] Depression, Diabetes mellitus ([facility] [address] Glaucoma, Hyperlipidemia, Hypertension, Macular degeneration, Thyroid disease, and TIA (transient ischemic attack). Comorbidities reviewed and education provided. Patient and family's stated goal of care: reduce shortness of breath, increase activity tolerance, be more comfortable and reduce lower extremity edema prior to discharge    Patient's current functional capacity:  Marked limitation of physical activity. Comfortable at rest. Less than ordinary activity causes fatigue, palpitation, or dyspnea. Pt resting in bed at this time on 2 L O2. Pt denies shortness of breath. Pt with pitting lower extremity edema.  Patient's weights and intake/output reviewed:    Patient Vitals for the past 96 hrs (Last 3 readings):   Weight   04/21/19 0627 138 lb 1.6 oz (62.6 kg)   04/20/19 0656 132 lb 8 oz (60.1 kg)   04/19/19 0613 135 lb 4.8 oz (61.4 kg)       Intake/Output Summary (Last 24 hours) at 4/21/2019 0858  Last data filed at 4/21/2019 9500  Gross per 24 hour   Intake 1540 ml   Output 1450 ml   Net 90 ml         >>For CHF and Comorbidity documentation on Education Time and Topics, please see Education Tab

## 2019-04-22 ENCOUNTER — APPOINTMENT (OUTPATIENT)
Dept: CT IMAGING | Age: 84
DRG: 291 | End: 2019-04-22
Payer: MEDICARE

## 2019-04-22 LAB
ANION GAP SERPL CALCULATED.3IONS-SCNC: 8 MMOL/L (ref 3–16)
BUN BLDV-MCNC: 21 MG/DL (ref 7–20)
CALCIUM SERPL-MCNC: 8.8 MG/DL (ref 8.3–10.6)
CHLORIDE BLD-SCNC: 93 MMOL/L (ref 99–110)
CO2: 35 MMOL/L (ref 21–32)
CREAT SERPL-MCNC: 1.3 MG/DL (ref 0.6–1.2)
GFR AFRICAN AMERICAN: 46
GFR NON-AFRICAN AMERICAN: 38
GLUCOSE BLD-MCNC: 100 MG/DL (ref 70–99)
GLUCOSE BLD-MCNC: 109 MG/DL (ref 70–99)
GLUCOSE BLD-MCNC: 142 MG/DL (ref 70–99)
GLUCOSE BLD-MCNC: 144 MG/DL (ref 70–99)
GLUCOSE BLD-MCNC: 158 MG/DL (ref 70–99)
GLUCOSE BLD-MCNC: 69 MG/DL (ref 70–99)
MAGNESIUM: 2 MG/DL (ref 1.8–2.4)
PERFORMED ON: ABNORMAL
POTASSIUM SERPL-SCNC: 4.4 MMOL/L (ref 3.5–5.1)
SODIUM BLD-SCNC: 136 MMOL/L (ref 136–145)

## 2019-04-22 PROCEDURE — 6370000000 HC RX 637 (ALT 250 FOR IP): Performed by: INTERNAL MEDICINE

## 2019-04-22 PROCEDURE — 94761 N-INVAS EAR/PLS OXIMETRY MLT: CPT

## 2019-04-22 PROCEDURE — 2700000000 HC OXYGEN THERAPY PER DAY

## 2019-04-22 PROCEDURE — 36415 COLL VENOUS BLD VENIPUNCTURE: CPT

## 2019-04-22 PROCEDURE — 94640 AIRWAY INHALATION TREATMENT: CPT

## 2019-04-22 PROCEDURE — 83735 ASSAY OF MAGNESIUM: CPT

## 2019-04-22 PROCEDURE — 97116 GAIT TRAINING THERAPY: CPT

## 2019-04-22 PROCEDURE — 97530 THERAPEUTIC ACTIVITIES: CPT

## 2019-04-22 PROCEDURE — 1200000000 HC SEMI PRIVATE

## 2019-04-22 PROCEDURE — 6360000002 HC RX W HCPCS: Performed by: INTERNAL MEDICINE

## 2019-04-22 PROCEDURE — 71250 CT THORAX DX C-: CPT

## 2019-04-22 PROCEDURE — 2580000003 HC RX 258: Performed by: INTERNAL MEDICINE

## 2019-04-22 PROCEDURE — 80048 BASIC METABOLIC PNL TOTAL CA: CPT

## 2019-04-22 RX ORDER — TORSEMIDE 20 MG/1
40 TABLET ORAL DAILY
Status: DISCONTINUED | OUTPATIENT
Start: 2019-04-22 | End: 2019-04-23

## 2019-04-22 RX ADMIN — LATANOPROST 1 DROP: 50 SOLUTION OPHTHALMIC at 22:56

## 2019-04-22 RX ADMIN — OXYCODONE AND ACETAMINOPHEN 2 TABLET: 5; 325 TABLET ORAL at 19:51

## 2019-04-22 RX ADMIN — OXYCODONE AND ACETAMINOPHEN 2 TABLET: 5; 325 TABLET ORAL at 15:16

## 2019-04-22 RX ADMIN — ATORVASTATIN CALCIUM 80 MG: 80 TABLET, FILM COATED ORAL at 22:58

## 2019-04-22 RX ADMIN — PANTOPRAZOLE SODIUM 40 MG: 40 TABLET, DELAYED RELEASE ORAL at 09:18

## 2019-04-22 RX ADMIN — Medication 2 PUFF: at 08:16

## 2019-04-22 RX ADMIN — INSULIN LISPRO 6 UNITS: 100 INJECTION, SOLUTION INTRAVENOUS; SUBCUTANEOUS at 17:07

## 2019-04-22 RX ADMIN — CETIRIZINE HYDROCHLORIDE 5 MG: 5 TABLET ORAL at 09:12

## 2019-04-22 RX ADMIN — TORSEMIDE 40 MG: 20 TABLET ORAL at 15:16

## 2019-04-22 RX ADMIN — OXAZEPAM 15 MG: 15 CAPSULE ORAL at 11:43

## 2019-04-22 RX ADMIN — INSULIN LISPRO 6 UNITS: 100 INJECTION, SOLUTION INTRAVENOUS; SUBCUTANEOUS at 09:20

## 2019-04-22 RX ADMIN — ASPIRIN 325 MG: 325 TABLET, COATED ORAL at 09:12

## 2019-04-22 RX ADMIN — CITALOPRAM HYDROBROMIDE 20 MG: 20 TABLET ORAL at 09:12

## 2019-04-22 RX ADMIN — INSULIN LISPRO 1 UNITS: 100 INJECTION, SOLUTION INTRAVENOUS; SUBCUTANEOUS at 09:19

## 2019-04-22 RX ADMIN — ENOXAPARIN SODIUM 30 MG: 30 INJECTION SUBCUTANEOUS at 09:14

## 2019-04-22 RX ADMIN — TRAZODONE HYDROCHLORIDE 150 MG: 50 TABLET ORAL at 23:05

## 2019-04-22 RX ADMIN — OXYCODONE AND ACETAMINOPHEN 2 TABLET: 5; 325 TABLET ORAL at 09:12

## 2019-04-22 RX ADMIN — FUROSEMIDE 40 MG: 40 TABLET ORAL at 09:12

## 2019-04-22 RX ADMIN — Medication 2 PUFF: at 21:25

## 2019-04-22 RX ADMIN — PANTOPRAZOLE SODIUM 40 MG: 40 TABLET, DELAYED RELEASE ORAL at 22:57

## 2019-04-22 RX ADMIN — DORZOLAMIDE HYDROCHLORIDE 1 DROP: 20 SOLUTION/ DROPS OPHTHALMIC at 22:56

## 2019-04-22 RX ADMIN — GABAPENTIN 100 MG: 100 CAPSULE ORAL at 09:12

## 2019-04-22 RX ADMIN — DORZOLAMIDE HYDROCHLORIDE 1 DROP: 20 SOLUTION/ DROPS OPHTHALMIC at 09:17

## 2019-04-22 RX ADMIN — FERROUS SULFATE TAB 325 MG (65 MG ELEMENTAL FE) 325 MG: 325 (65 FE) TAB at 09:12

## 2019-04-22 RX ADMIN — INSULIN LISPRO 1 UNITS: 100 INJECTION, SOLUTION INTRAVENOUS; SUBCUTANEOUS at 17:07

## 2019-04-22 RX ADMIN — OXYCODONE AND ACETAMINOPHEN 2 TABLET: 5; 325 TABLET ORAL at 05:01

## 2019-04-22 RX ADMIN — OXAZEPAM 15 MG: 15 CAPSULE ORAL at 23:06

## 2019-04-22 RX ADMIN — OXAZEPAM 15 MG: 15 CAPSULE ORAL at 15:16

## 2019-04-22 RX ADMIN — OXYCODONE AND ACETAMINOPHEN 2 TABLET: 5; 325 TABLET ORAL at 23:58

## 2019-04-22 RX ADMIN — LEVOTHYROXINE SODIUM 100 MCG: 100 TABLET ORAL at 05:01

## 2019-04-22 RX ADMIN — GABAPENTIN 100 MG: 100 CAPSULE ORAL at 22:56

## 2019-04-22 RX ADMIN — FERROUS SULFATE TAB 325 MG (65 MG ELEMENTAL FE) 325 MG: 325 (65 FE) TAB at 17:07

## 2019-04-22 RX ADMIN — POTASSIUM BICARBONATE 20 MEQ: 782 TABLET, EFFERVESCENT ORAL at 09:12

## 2019-04-22 ASSESSMENT — PAIN DESCRIPTION - FREQUENCY
FREQUENCY: CONTINUOUS

## 2019-04-22 ASSESSMENT — PAIN DESCRIPTION - ORIENTATION
ORIENTATION: LEFT

## 2019-04-22 ASSESSMENT — PAIN DESCRIPTION - ONSET
ONSET: ON-GOING

## 2019-04-22 ASSESSMENT — PAIN DESCRIPTION - DESCRIPTORS
DESCRIPTORS: CONSTANT;DISCOMFORT
DESCRIPTORS: DISCOMFORT
DESCRIPTORS: CONSTANT;DISCOMFORT

## 2019-04-22 ASSESSMENT — PAIN DESCRIPTION - PROGRESSION
CLINICAL_PROGRESSION: NOT CHANGED

## 2019-04-22 ASSESSMENT — PAIN SCALES - GENERAL
PAINLEVEL_OUTOF10: 8
PAINLEVEL_OUTOF10: 10
PAINLEVEL_OUTOF10: 0

## 2019-04-22 ASSESSMENT — PAIN DESCRIPTION - PAIN TYPE
TYPE: ACUTE PAIN

## 2019-04-22 ASSESSMENT — PAIN DESCRIPTION - LOCATION
LOCATION: ARM

## 2019-04-22 ASSESSMENT — PAIN - FUNCTIONAL ASSESSMENT
PAIN_FUNCTIONAL_ASSESSMENT: INTOLERABLE, UNABLE TO DO ANY ACTIVE OR PASSIVE ACTIVITIES
PAIN_FUNCTIONAL_ASSESSMENT: INTOLERABLE, UNABLE TO DO ANY ACTIVE OR PASSIVE ACTIVITIES

## 2019-04-22 NOTE — PROGRESS NOTES
VSS - afebrile. Pt is alert and oriented x 4 with no history of falls. Assessment completed as charted. Bed is in lowest position with 2/4 bed rails raised. Bed alarm turned on, wheels locked, and call light within reach. Patient wearing non-skid socks and verbalizes understanding to call out for assistance. No further requests at this time. Will continue to monitor patient.     Vitals:    04/22/19 0431   BP: 131/79   Pulse: 70   Resp: 16   Temp: 97.5 °F (36.4 °C)   SpO2: 97%     Electronically signed by Alpesh Hua RN on 4/22/2019 at 6:18 AM

## 2019-04-22 NOTE — PROGRESS NOTES
Patient is awake, alert and oriented x4. Assessment is complete, see flow sheet. Bed in lowest position, wheels locked, call light is within reach. Patient denies any further needs at the moment. Will continue to monitor. BP (!) 143/68   Pulse 77   Temp 97.5 °F (36.4 °C) (Oral)   Resp 16   Ht 5' 1\" (1.549 m)   Wt 137 lb 14.4 oz (62.6 kg)   SpO2 91%   BMI 26.06 kg/m²     Pain 10/10, 2 Perocet given. Lotion/back rub given. Repositioned. Will continue to monitor.  \    Blood Glucose 142

## 2019-04-22 NOTE — PLAN OF CARE
Problem: OXYGENATION/RESPIRATORY FUNCTION  Goal: Patient will maintain patent airway  Outcome: Ongoing  Note:   Patient's EF (Ejection Fraction) is greater than 40%    Patient has a past medical history of Anxiety, CAD (coronary artery disease), Cancer ([facility] [address] CHF (congestive heart failure) ([facility] [address] Depression, Diabetes mellitus ([facility] [address] Glaucoma, Hyperlipidemia, Hypertension, Macular degeneration, Thyroid disease, and TIA (transient ischemic attack). Comorbidities reviewed and education provided. Patient and family's stated goal of care: better understand heart failure and disease management, be more comfortable and reduce lower extremity edema prior to discharge    Patient's current functional capacity:  Slight limitation of physical activity. Comfortable at rest. Ordinary physical activity results in fatigue, palpitation, dyspnea. Pt resting in bed at this time on 1 L O2. Pt denies shortness of breath. Pt with nonpitting lower extremity edema. Patient's weights and intake/output reviewed:    Patient Vitals for the past 96 hrs (Last 3 readings):   Weight   04/21/19 0627 138 lb 1.6 oz (62.6 kg)   04/20/19 0656 132 lb 8 oz (60.1 kg)   04/19/19 0613 135 lb 4.8 oz (61.4 kg)       Intake/Output Summary (Last 24 hours) at 4/22/2019 2688  Last data filed at 4/22/2019 0431  Gross per 24 hour   Intake 2040 ml   Output 800 ml   Net 1240 ml         >>For CHF and Comorbidity documentation on Education Time and Topics, please see Education Tab      Problem: Serum Glucose Level - Abnormal:  Goal: Ability to maintain appropriate glucose levels will improve  Description  Ability to maintain appropriate glucose levels will improve  Outcome: Ongoing  Note:   Pt at risk for elevated blood glucose levels R/T DM. Pt will have glucose levels monitored prior to breakfast, lunch, dinner, and bedtime. Elevated levels will be treated via sliding scale instructions.   Pt understands the need to monitor levels and has no further complaints at this time.

## 2019-04-22 NOTE — PLAN OF CARE
Problem: Pain:  Goal: Control of acute pain  Description  Control of acute pain  4/22/2019 1155 by Beatris Landau, RN  Outcome: Ongoing  Note:   Patient's pain level is uncontrolled at this time, percocet PRN q4, last given at 0900. Will continue to monitor. Problem: Falls - Risk of:  Goal: Absence of physical injury  Description  Absence of physical injury  4/22/2019 1155 by Beatris Landau, RN  Outcome: Ongoing  Note:   Pt high fall risk. Instructed to use call light before getting out of bed. Assist x 1-2 with stedy, stand/pivot to [facility]. Call light within reach. Bed in low position. Bed alarm on. Will continue to monitor. Problem: Risk for Impaired Skin Integrity  Goal: Tissue integrity - skin and mucous membranes  Description  Structural intactness and normal physiological function of skin and  mucous membranes. 4/22/2019 1155 by Beatris Landau, RN  Outcome: Ongoing  Note:   Patient's skin assessed. See flowsheet. Patient turned in bed. Pressure ulcer prevention in place. No issues with skin integrity this shift. Will continue to monitor.

## 2019-04-22 NOTE — PROGRESS NOTES
End of shift report given to Josh Salcido RN at bedside. Patient alert and oriented. Bed in lowest position with wheels locked. Call light within reach.  No further needs at this time. Salvador Shine

## 2019-04-22 NOTE — CONSULTS
Ophthalmic Daily Britton Vazquez MD   1 drop at 04/21/19 2215    citalopram (CELEXA) tablet 20 mg  20 mg Oral Daily Britton Vazquez MD   20 mg at 04/22/19 0912    calcitRIOL (ROCALTROL) capsule 0.25 mcg  0.25 mcg Oral Once per day on Tue Fri Britton Vazquez MD   0.25 mcg at 04/19/19 1054    dorzolamide (TRUSOPT) 2 % ophthalmic solution 1 drop  1 drop Both Eyes BID Britton Vazquez MD   1 drop at 04/22/19 0917    cetirizine (ZYRTEC) tablet 5 mg  5 mg Oral Daily Britton Vazquez MD   5 mg at 04/22/19 0912    traZODone (DESYREL) tablet 150 mg  150 mg Oral Nightly Britton Vazquez MD   150 mg at 04/21/19 2216    nitroGLYCERIN (NITROSTAT) SL tablet 0.4 mg  0.4 mg Sublingual Q5 Min PRN Britton Vazquez MD        insulin lispro (HUMALOG) injection vial 6 Units  6 Units Subcutaneous BID Britton Vazquez MD   6 Units at 04/22/19 0920    insulin glargine (LANTUS) injection vial 12 Units  12 Units Subcutaneous Nightly Britton Vazquez MD   12 Units at 04/21/19 2220    ferrous sulfate tablet 325 mg  325 mg Oral BID WC Britton Vazquez MD   325 mg at 04/22/19 0912    pantoprazole (PROTONIX) tablet 40 mg  40 mg Oral BID Britton Vazquez MD   40 mg at 04/22/19 9580    gabapentin (NEURONTIN) capsule 100 mg  100 mg Oral BID Britton Vazquez MD   100 mg at 04/22/19 0912    levothyroxine (SYNTHROID) tablet 100 mcg  100 mcg Oral Daily Britton Vazquez MD   100 mcg at 04/22/19 0501    mometasone-formoterol (DULERA) 100-5 MCG/ACT inhaler 2 puff  2 puff Inhalation BID Britton Vazquez MD   2 puff at 04/22/19 0816    sodium chloride flush 0.9 % injection 10 mL  10 mL Intravenous 2 times per day Britton Vazquez MD   10 mL at 04/20/19 0949    sodium chloride flush 0.9 % injection 10 mL  10 mL Intravenous PRN Britton Vazquez MD        magnesium hydroxide (MILK OF MAGNESIA) 400 MG/5ML suspension 30 mL  30 mL Oral Daily PRN Britton Vazquez MD        enoxaparin (LOVENOX) injection 30 mg  30 mg Subcutaneous Daily Britton Vazquez MD   30 mg at 04/22/19 5130  glucose (GLUTOSE) 40 % oral gel 15 g  15 g Oral PRN Wilbur Desai MD        dextrose 50 % solution 12.5 g  12.5 g Intravenous PRN Wilbur Desai MD        glucagon (rDNA) injection 1 mg  1 mg Intramuscular PRN Wilbur Desai MD        dextrose 5 % solution  100 mL/hr Intravenous PRN Wilbur Desai MD        insulin lispro (HUMALOG) injection vial 0-6 Units  0-6 Units Subcutaneous TID WC Wilbur Desai MD   1 Units at 04/22/19 0919    insulin lispro (HUMALOG) injection vial 0-3 Units  0-3 Units Subcutaneous Nightly Wilbur Desai MD   1 Units at 04/21/19 2220    oxazepam (SERAX) capsule 15 mg  15 mg Oral TID Wilbur Desai MD   15 mg at 04/22/19 1516     Allergies: Allergies   Allergen Reactions    Aciphex [Rabeprazole]      Intolerance      Altace [Ramipril]      Intolerance      Codeine     Cozaar [Losartan]      Intolerance      Iodine Itching    Iv Dye [Iodides]     Sulfa Antibiotics Nausea Only    Ativan [Lorazepam] Anxiety       Social History:   Social History     Socioeconomic History    Marital status:       Spouse name: Not on file    Number of children: Not on file    Years of education: Not on file    Highest education level: Not on file   Occupational History    Not on file   Social Needs    Financial resource strain: Not on file    Food insecurity:     Worry: Not on file     Inability: Not on file    Transportation needs:     Medical: Not on file     Non-medical: Not on file   Tobacco Use    Smoking status: Former Smoker    Smokeless tobacco: Never Used   Substance and Sexual Activity    Alcohol use: No    Drug use: No    Sexual activity: Not on file   Lifestyle    Physical activity:     Days per week: Not on file     Minutes per session: Not on file    Stress: Not on file   Relationships    Social connections:     Talks on phone: Not on file     Gets together: Not on file     Attends Congregation service: Not on file     Active member of club or organization: Not on file     Attends meetings of clubs or organizations: Not on file     Relationship status: Not on file    Intimate partner violence:     Fear of current or ex partner: Not on file     Emotionally abused: Not on file     Physically abused: Not on file     Forced sexual activity: Not on file   Other Topics Concern    Not on file   Social History Narrative    Not on file          Family History:     History reviewed. No pertinent family history. REVIEW OF SYSTEMS:    Review of Systems    PHYSICAL EXAM:      Vitals:  BP (!) 104/43   Pulse 74   Temp 98.2 °F (36.8 °C) (Oral)   Resp 16   Ht 5' 1\" (1.549 m)   Wt 137 lb 14.4 oz (62.6 kg)   SpO2 93%   BMI 26.06 kg/m²     CONSTITUTIONAL:  awake, alert, cooperative, no apparent distress, and appears stated age NAD  EYES:  pupils equal, round and reactive to light, extra ocular muscles intact, sclera clear, conjunctiva normal  NECK:Supple, symmetrical, trachea midline, no adenopathy, thyroid symmetric, not enlarged and no tenderness, skin normal  HEMATOLOGIC/LYMPHATICS:  no cervical lymphadenopathy, no supraclavicular lymphadenopathy, no axillarylymphadenopathy and no inguinal lymphadenopathy  LUNGS:  No increased work of breathing, good air exchange, clear to auscultation bilaterally, no crackles or wheezing  CARDIOVASCULAR:  , regular rate and rhythm, normalS1 and S2, no S3 or S4, and no murmur noted  ABDOMEN:  No scars, normal bowel sounds, soft, non-distended, non-tender, no masses palpated, no hepatosplenomegally  MUSCULOSKELETAL:  The left arm is in a sling. NEUROLOGIC:  She is extremely hard of hearing. SKIN:  no bruising or bleeding      DATA:    PT/INR:  No results for input(s): PROT, INR in the last 72 hours. PTT:  No results for input(s): APTT in the last 72 hours.   CMP:    Lab Results   Component Value Date     04/22/2019    K 4.4 04/22/2019    K 3.9 04/17/2019    CL 93 04/22/2019    CO2 35 04/22/2019    BUN 21 04/22/2019    PROT 6.3 04/17/2019     Magnesium:    Lab Results   Component Value Date    MG 2.00 04/22/2019     Phosphorus:  No components found for: PO4  Calcium:  No components found for: CA  CBC:    Lab Results   Component Value Date    WBC 6.3 04/18/2019    RBC 3.99 04/18/2019    HGB 12.8 04/18/2019    HCT 37.6 04/18/2019    MCV 94.2 04/18/2019    RDW 13.7 04/18/2019     04/18/2019     DIFF:  Lab Results   Component Value Date    MCV 94.2 04/18/2019    RDW 13.7 04/18/2019      [email or web address]  Uric Acid:  @labcrnt(URIC)@    Radiology Review:          Problem List  Patient Active Problem List   Diagnosis    Acute on chronic diastolic congestive heart failure (HCC)    Pleural effusion    Coronary artery disease involving native heart without angina pectoris    Non-rheumatic mitral regurgitation    Bilateral carotid artery stenosis    Mixed hyperlipidemia    Acute on chronic diastolic CHF (congestive heart failure) ([facility] [address]    Diabetes mellitus ([facility] [address]    Benign essential HTN    Closed fracture of left humerus       IMPRESSION/RECOMMENDATIONS:    Possible pathologic fracture:  -This is not definitive  -She does have a history of smoking and a CT of the chest will be ordered along with a bone scan  -When the consult was called in around 2:00, I did notify the floor that I would be available at 5:00 to talk to the family, for some reason the family left prior to my arrival.  -I did discuss this plan with the patient. She is relatively frail and I am not sure that she could tolerate much treatment unless this was a lung cancer with a  mutation.  -The patient was interested in a CAT scan had a bone scan however. Thank you for asking me to see the patient.        Senthil Darling MD  Please Contact Through [employer]

## 2019-04-22 NOTE — PROGRESS NOTES
Physical Therapy  Facility/Department: [facility] A2 CARD TELEMETRY  Daily Treatment Note  NAME: Brittany Martel  : 1928  MRN: [id number]    Date of Service: 2019    Discharge Recommendations:  Subacute/Skilled Nursing Facility   PT Equipment Recommendations  Equipment Needed: No    Patient Diagnosis(es): The primary encounter diagnosis was Other closed nondisplaced fracture of distal end of left humerus, initial encounter. Diagnoses of Fall, initial encounter and Acute on chronic congestive heart failure, unspecified heart failure type [facility]) were also pertinent to this visit. has a past medical history of Anxiety, CAD (coronary artery disease), Cancer ([facility] [address] CHF (congestive heart failure) ([facility] [address] Depression, [facility] Glaucoma, Hyperlipidemia, Hypertension, Macular degeneration, Thyroid disease, and TIA (transient ischemic attack). has a past surgical history that includes Cholecystectomy; Cardiac surgery; Thyroidectomy; Spine surgery; and Coronary artery bypass graft. Restrictions  Restrictions/Precautions  Restrictions/Precautions: Weight Bearing, Fall Risk  Required Braces or Orthoses?: Yes  Upper Extremity Weight Bearing Restrictions  Right Upper Extremity Weight Bearing: Non Weight Bearing  Required Braces or Orthoses  Left Upper Extremity Brace/Splint: Sling  Left Upper Extremity Brace/Splint: L long arm splint  Position Activity Restriction  Other position/activity restrictions: High fall risk, Up as tolerated  Subjective   General  Chart Reviewed: Yes  Response To Previous Treatment: Patient with no complaints from previous session. Family / Caregiver Present: Yes(Daughter)  Referring Practitioner: Dr. Pricilla Sanchez,   Subjective  Subjective: pt agreeable to therapy  General Comment  Comments: Pt resting in bed on approach; RN cleared pt for therapy  Pain Screening  Patient Currently in Pain: Yes(reports left arm with mobility. RN notified;  Pt declined ice)        Objective   Bed

## 2019-04-22 NOTE — PLAN OF CARE
Problem: Pain:  Goal: Pain level will decrease  Description  Pain level will decrease  Outcome: Ongoing  Note:   Patient currently rating her pain 10/10. Administering PRN pain medication as ordered. Will continue to monitor. Problem: Falls - Risk of:  Goal: Will remain free from falls  Description  Will remain free from falls  Outcome: Ongoing  Note:   Pt high fall risk. Instructed to use call light before getting out of bed. Call light within reach. Bed in low position. Bed alarm on. Patient free from falls and near-misses this shift. Will continue to monitor patient. Problem: Risk for Impaired Skin Integrity  Goal: Tissue integrity - skin and mucous membranes  Description  Structural intactness and normal physiological function of skin and  mucous membranes. Outcome: Ongoing  Note:   Patient's skin assessed. See flowsheet. Patient turned in bed. Pressure ulcer prevention in place. No issues with skin integrity this shift. Will continue to monitor.

## 2019-04-22 NOTE — PROGRESS NOTES
Hospitalist Progress Note      PCP: Tl Munoz    Date of Admission: 4/17/2019    Chief Complaint:  LUE pain, dyspnea    Hospital Course: \"80 y.o. female who presented to [facility] with subacute onset of pain in left arm for last 3 weeks, also noted gradually worsening shortness of breath on exertion and increasing swelling on lower extremity. Patient denies any accidental fall or injury, fever, chest pain, cough or congestion, abdominal pain, nausea or vomiting. In ER workup patient was found possible pathological fracture on left lower end of the humerus and CHF. \"       Subjective:  She feels fine. Denies dyspnea. Complains of intermittent LUE pain. We discussed the possible cancer, daughter feels strongly that she wants to talk to oncology here rather than in follow up visit.         Medications:  Reviewed    Infusion Medications    dextrose       Scheduled Medications    furosemide  40 mg Oral Daily    potassium bicarb-citric acid  20 mEq Oral BID    furosemide  20 mg Intravenous Once    atorvastatin  80 mg Oral Nightly    aspirin  325 mg Oral Daily    latanoprost  1 drop Ophthalmic Daily    citalopram  20 mg Oral Daily    calcitRIOL  0.25 mcg Oral Once per day on Tue Fri    dorzolamide  1 drop Both Eyes BID    cetirizine  5 mg Oral Daily    traZODone  150 mg Oral Nightly    insulin lispro  6 Units Subcutaneous BID    insulin glargine  12 Units Subcutaneous Nightly    ferrous sulfate  325 mg Oral BID WC    pantoprazole  40 mg Oral BID    gabapentin  100 mg Oral BID    levothyroxine  100 mcg Oral Daily    mometasone-formoterol  2 puff Inhalation BID    sodium chloride flush  10 mL Intravenous 2 times per day    enoxaparin  30 mg Subcutaneous Daily    insulin lispro  0-6 Units Subcutaneous TID WC    insulin lispro  0-3 Units Subcutaneous Nightly    oxazepam  15 mg Oral TID     PRN Meds: oxyCODONE-acetaminophen, oxyCODONE-acetaminophen, prochlorperazine, nitroGLYCERIN, sodium chloride flush, magnesium hydroxide, glucose, dextrose, glucagon (rDNA), dextrose      Intake/Output Summary (Last 24 hours) at 4/22/2019 1343  Last data filed at 4/22/2019 1144  Gross per 24 hour   Intake 2280 ml   Output 750 ml   Net 1530 ml       Physical Exam Performed:    /61   Pulse 73   Temp 98.2 °F (36.8 °C) (Oral)   Resp 16   Ht 5' 1\" (1.549 m)   Wt 137 lb 14.4 oz (62.6 kg)   SpO2 95%   BMI 26.06 kg/m²     General appearance: No apparent distress, appears stated age and cooperative. HEENT: Pupils equal, round, and reactive to light. Conjunctivae/corneas clear. Very hard of hearing. Neck: Supple, with full range of motion. No jugular venous distention. Trachea midline. Respiratory:  Normal respiratory effort. Clear to auscultation, bilaterally without Rales/Wheezes/Rhonchi. Diminished in bases. Cardiovascular: Regular rate and rhythm with normal S1/S2 without murmurs, rubs or gallops. Abdomen: Soft, non-tender, non-distended with normal bowel sounds. Musculoskeletal: No clubbing, cyanosis or edema bilaterally. LUE in sling. Skin: Skin color, texture, turgor normal.  No rashes or lesions. Neurologic:  Neurovascularly intact without any focal sensory/motor deficits. Cranial nerves: II-XII intact, grossly non-focal.  Psychiatric: Alert and oriented, thought content appropriate, limited insight. Capillary Refill: Brisk,< 3 seconds   Peripheral Pulses: +2 palpable, equal bilaterally       Labs:   No results for input(s): WBC, HGB, HCT, PLT in the last 72 hours. Recent Labs     04/20/19  0636 04/21/19  0645 04/22/19  0730    136 136   K 4.1 4.6 4.4   CL 93* 94* 93*   CO2 34* 33* 35*   BUN 22* 22* 21*   CREATININE 1.3* 1.3* 1.3*   CALCIUM 8.8 8.6 8.8     No results for input(s): AST, ALT, BILIDIR, BILITOT, ALKPHOS in the last 72 hours. No results for input(s): INR in the last 72 hours.   No results for input(s): Javed Rivera in the last 72 hours.    Urinalysis:      Lab Results   Component Value Date    NITRU Negative 01/06/2019    WBCUA 0-2 12/28/2018    BACTERIA 4+ 12/28/2018    RBCUA 0-2 12/28/2018    BLOODU Negative 01/06/2019    SPECGRAV 1.015 01/06/2019    GLUCOSEU Negative 01/06/2019       Radiology:  XR CHEST PORTABLE   Final Result   Mild heart failure with slightly improved pulmonary edema and unchanged left   pleural effusion. No acute osseous abnormality. XR HUMERUS LEFT (MIN 2 VIEWS)   Final Result   Distal humeral fracture, presumably pathologic as it occurs in an area   demonstrating subtle lytic irregularity suggesting underlying bone lesion. Assessment/Plan:    Active Hospital Problems    Diagnosis Date Noted    Coronary artery disease involving native heart without angina pectoris [I25.10]      Priority: High    Closed fracture of left humerus [S42.302A] 04/17/2019    Diabetes mellitus ([facility] [address] [E11.9] 01/04/2019    Benign essential HTN [I10] 01/04/2019    Acute on chronic diastolic congestive heart failure ([facility] [address] [I50.33] 06/19/2018       \"89 y.o. female who presented to [facility] with subacute onset of pain in left arm for last 3 weeks, also noted gradually worsening shortness of breath on exertion and increasing swelling on lower extremity. Patient denies any accidental fall or injury, fever, chest pain, cough or congestion, abdominal pain, nausea or vomiting. In ER workup patient was found possible pathological fracture on left lower end of the humerus and CHF. \"      LUE fracture, possibly pathologic due to unknown malignancy  - analgesia. - ortho decided the risks of surgery outweigh the benefits. F/u in clinic for repeat Xray  - oncology consulted.    - PT/OT, patient and family want [facility]    Acute hypoxic respiratory failure  - likely due to atelectasis related to above issue. IS. Mobilize patient as tolerated.      Acute on chronic diastolic CHF  - CXR on admission had possible mild CHF. She didn't respond much to diuresis. Cr increased some. Patient said she no longer felt dyspneic. Resumed home diuretic regimen. - carvedilol. No ACE/ARB due to renal instability. CAD  - aspirin, statin, carvedilol    DM2  - adjusted insulin regimen      DVT Prophylaxis: enoxaparin  Diet: DIET CARB CONTROL; No Added Salt (3-4 GM)  Code Status: Full Code    PT/OT Eval Status: eval ordered    Dispo - likely medically ready for discharge 4/23. She wants SNF.       Elroy Jaime MD

## 2019-04-22 NOTE — PROGRESS NOTES
Occupational Therapy  Facility/Department: [facility] A2 CARD TELEMETRY  Daily Treatment Note  NAME: Brant Queen  : 1928  MRN: [id number]    Date of Service: 2019    Discharge Recommendations:  Subacute/Skilled Nursing Facility  OT Equipment Recommendations  Other: defer to next level of care    Assessment   Performance deficits / Impairments: Decreased functional mobility ; Decreased ADL status; Decreased endurance;Decreased balance;Decreased high-level IADLs;Decreased safe awareness  Assessment: Pt limited due to pain during session. Pt required max A for repositioning sling for increased comfort. Pt completed ambulation with mod A x2. Cont with POC. Patient Education: role of OT, transfers, safety   REQUIRES OT FOLLOW UP: Yes  Activity Tolerance  Activity Tolerance: Patient limited by pain  Safety Devices  Safety Devices in place: Yes  Type of devices: Call light within reach; Left in chair;Nurse notified; Chair alarm in place         Patient Diagnosis(es): The primary encounter diagnosis was Other closed nondisplaced fracture of distal end of left humerus, initial encounter. Diagnoses of Fall, initial encounter and Acute on chronic congestive heart failure, unspecified heart failure type [facility]) were also pertinent to this visit. has a past medical history of Anxiety, CAD (coronary artery disease), Cancer ([facility] [address] CHF (congestive heart failure) ([facility] [address] Depression, Diabetes mellitus ([facility] [address] Glaucoma, Hyperlipidemia, Hypertension, Macular degeneration, Thyroid disease, and TIA (transient ischemic attack). has a past surgical history that includes Cholecystectomy; Cardiac surgery; Thyroidectomy; Spine surgery; and Coronary artery bypass graft.     Restrictions  Restrictions/Precautions  Restrictions/Precautions: Weight Bearing, Fall Risk  Required Braces or Orthoses?: Yes  Upper Extremity Weight Bearing Restrictions  Right Upper Extremity Weight Bearing: Non Weight Bearing  Required Braces or Orthoses  Left Upper Extremity Brace/Splint: Sling  Left Upper Extremity Brace/Splint: L long arm splint  Position Activity Restriction  Other position/activity restrictions: High fall risk, Up as tolerated  Subjective   General  Chart Reviewed: Yes  Patient assessed for rehabilitation services?: Yes  Family / Caregiver Present: Yes(daughter)  Referring Practitioner: Tameka Rasmussen  Diagnosis: L distal humerus fx (presumably pathological)  Subjective  Subjective: Pt agreeable to therapy   General Comment  Comments: RN cleared pt for OT  Pain Assessment  Non-Pharmaceutical Pain Intervention(s): Therapeutic presence; Emotional support;Repositioned  Vital Signs  Patient Currently in Pain: Yes(Pt c/o pain, RN notified, pt repositioned did not rate)   Orientation  Orientation  Overall Orientation Status: Within Functional Limits  Objective    ADL  UE Dressing: Maximum assistance(assist with gown and washing/applying lotion on back)        Balance  Sitting Balance: Contact guard assistance  Standing Balance: Dependent/Total(mod A x2 )  Standing Balance  Time: ~1 minutes   Activity: in room ambulation  Comment: no AD  Functional Mobility  Functional - Mobility Device: Other(HHA)  Activity: Other  Assist Level: Dependent/Total(mod A x2)  Bed mobility  Supine to Sit: Minimal assistance(HOB elevated)  Scooting: Contact guard assistance  Comment: Pt in bedside chair at end of session  Transfers  Stand Step Transfers: Contact guard assistance(HHA)  Sit to stand: Dependent/Total(mod A x2)  Stand to sit: Dependent/Total(mod A x2)                       Cognition  Overall Cognitive Status: Exceptions  Arousal/Alertness: Appropriate responses to stimuli  Following Commands:  Follows one step commands with increased time  Attention Span: Attends with cues to redirect  Memory: Decreased recall of recent events  Problem Solving: Assistance required to correct errors made;Assistance required to identify errors made  Initiation: Requires cues for some  Sequencing: Does not require cues           Plan   Plan  Times per week: 3-5x    AM-PAC Score        AM-PAC Inpatient Daily Activity Raw Score: 9  AM-PAC Inpatient ADL T-Scale Score : 25.33  ADL Inpatient CMS 0-100% Score: 79.59  ADL Inpatient CMS G-Code Modifier : CL    Goals  Short term goals  Time Frame for Short term goals: for 1 week or otherwise specified  Short term goal 1: Perform UE dressing with min A by 4/23  Short term goal 2: Perform functional transfer to chair/commode with CGA by 4/19; progressing continue to 4/25/19  Short term goal 3: Perform toileting with mod A  Patient Goals   Patient goals : Pt did not state       Therapy Time   Individual Concurrent Group Co-treatment   Time In 1409         Time Out 1442         Minutes 33         Timed Code Treatment Minutes: [address] [facility] IZA Kemp/SOCORRO

## 2019-04-23 ENCOUNTER — APPOINTMENT (OUTPATIENT)
Dept: NUCLEAR MEDICINE | Age: 84
DRG: 291 | End: 2019-04-23
Payer: MEDICARE

## 2019-04-23 LAB
A/G RATIO: 1.1 (ref 1.1–2.2)
ALBUMIN SERPL-MCNC: 3.2 G/DL (ref 3.4–5)
ALP BLD-CCNC: 77 U/L (ref 40–129)
ALT SERPL-CCNC: 12 U/L (ref 10–40)
ANION GAP SERPL CALCULATED.3IONS-SCNC: 10 MMOL/L (ref 3–16)
ANION GAP SERPL CALCULATED.3IONS-SCNC: 7 MMOL/L (ref 3–16)
AST SERPL-CCNC: 18 U/L (ref 15–37)
BILIRUB SERPL-MCNC: 0.5 MG/DL (ref 0–1)
BUN BLDV-MCNC: 26 MG/DL (ref 7–20)
BUN BLDV-MCNC: 27 MG/DL (ref 7–20)
CALCIUM SERPL-MCNC: 8.8 MG/DL (ref 8.3–10.6)
CALCIUM SERPL-MCNC: 9.1 MG/DL (ref 8.3–10.6)
CHLORIDE BLD-SCNC: 90 MMOL/L (ref 99–110)
CHLORIDE BLD-SCNC: 91 MMOL/L (ref 99–110)
CO2: 34 MMOL/L (ref 21–32)
CO2: 37 MMOL/L (ref 21–32)
CREAT SERPL-MCNC: 1.5 MG/DL (ref 0.6–1.2)
CREAT SERPL-MCNC: 1.5 MG/DL (ref 0.6–1.2)
GFR AFRICAN AMERICAN: 39
GFR AFRICAN AMERICAN: 39
GFR NON-AFRICAN AMERICAN: 33
GFR NON-AFRICAN AMERICAN: 33
GLOBULIN: 2.8 G/DL
GLUCOSE BLD-MCNC: 108 MG/DL (ref 70–99)
GLUCOSE BLD-MCNC: 124 MG/DL (ref 70–99)
GLUCOSE BLD-MCNC: 144 MG/DL (ref 70–99)
GLUCOSE BLD-MCNC: 159 MG/DL (ref 70–99)
GLUCOSE BLD-MCNC: 177 MG/DL (ref 70–99)
GLUCOSE BLD-MCNC: 198 MG/DL (ref 70–99)
GLUCOSE BLD-MCNC: 269 MG/DL (ref 70–99)
HCT VFR BLD CALC: 36.2 % (ref 36–48)
HEMOGLOBIN: 12.2 G/DL (ref 12–16)
IGA: 105 MG/DL (ref 70–400)
IGG: 806 MG/DL (ref 700–1600)
IGM: 128 MG/DL (ref 40–230)
MCH RBC QN AUTO: 32.2 PG (ref 26–34)
MCHC RBC AUTO-ENTMCNC: 33.7 G/DL (ref 31–36)
MCV RBC AUTO: 95.7 FL (ref 80–100)
PDW BLD-RTO: 14.1 % (ref 12.4–15.4)
PERFORMED ON: ABNORMAL
PLATELET # BLD: 231 K/UL (ref 135–450)
PMV BLD AUTO: 7.6 FL (ref 5–10.5)
POTASSIUM REFLEX MAGNESIUM: 4.4 MMOL/L (ref 3.5–5.1)
POTASSIUM SERPL-SCNC: 4.1 MMOL/L (ref 3.5–5.1)
RBC # BLD: 3.78 M/UL (ref 4–5.2)
SODIUM BLD-SCNC: 134 MMOL/L (ref 136–145)
SODIUM BLD-SCNC: 135 MMOL/L (ref 136–145)
TOTAL PROTEIN: 6 G/DL (ref 6.4–8.2)
WBC # BLD: 4.6 K/UL (ref 4–11)

## 2019-04-23 PROCEDURE — 84165 PROTEIN E-PHORESIS SERUM: CPT

## 2019-04-23 PROCEDURE — 84155 ASSAY OF PROTEIN SERUM: CPT

## 2019-04-23 PROCEDURE — 86300 IMMUNOASSAY TUMOR CA 15-3: CPT

## 2019-04-23 PROCEDURE — 85027 COMPLETE CBC AUTOMATED: CPT

## 2019-04-23 PROCEDURE — 94640 AIRWAY INHALATION TREATMENT: CPT

## 2019-04-23 PROCEDURE — 3430000000 HC RX DIAGNOSTIC RADIOPHARMACEUTICAL: Performed by: INTERNAL MEDICINE

## 2019-04-23 PROCEDURE — 78306 BONE IMAGING WHOLE BODY: CPT

## 2019-04-23 PROCEDURE — 2580000003 HC RX 258: Performed by: INTERNAL MEDICINE

## 2019-04-23 PROCEDURE — 97530 THERAPEUTIC ACTIVITIES: CPT

## 2019-04-23 PROCEDURE — 6360000002 HC RX W HCPCS: Performed by: INTERNAL MEDICINE

## 2019-04-23 PROCEDURE — 6370000000 HC RX 637 (ALT 250 FOR IP): Performed by: INTERNAL MEDICINE

## 2019-04-23 PROCEDURE — 80053 COMPREHEN METABOLIC PANEL: CPT

## 2019-04-23 PROCEDURE — 36415 COLL VENOUS BLD VENIPUNCTURE: CPT

## 2019-04-23 PROCEDURE — 1200000000 HC SEMI PRIVATE

## 2019-04-23 PROCEDURE — A9503 TC99M MEDRONATE: HCPCS | Performed by: INTERNAL MEDICINE

## 2019-04-23 PROCEDURE — 82784 ASSAY IGA/IGD/IGG/IGM EACH: CPT

## 2019-04-23 PROCEDURE — 83883 ASSAY NEPHELOMETRY NOT SPEC: CPT

## 2019-04-23 RX ORDER — TORSEMIDE 20 MG/1
20 TABLET ORAL DAILY
Status: DISCONTINUED | OUTPATIENT
Start: 2019-04-25 | End: 2019-04-24

## 2019-04-23 RX ORDER — TORSEMIDE 20 MG/1
20 TABLET ORAL DAILY
Status: DISCONTINUED | OUTPATIENT
Start: 2019-04-24 | End: 2019-04-23

## 2019-04-23 RX ORDER — TC 99M MEDRONATE 20 MG/10ML
25.6 INJECTION, POWDER, LYOPHILIZED, FOR SOLUTION INTRAVENOUS
Status: COMPLETED | OUTPATIENT
Start: 2019-04-23 | End: 2019-04-23

## 2019-04-23 RX ADMIN — OXYCODONE AND ACETAMINOPHEN 2 TABLET: 5; 325 TABLET ORAL at 21:57

## 2019-04-23 RX ADMIN — GABAPENTIN 100 MG: 100 CAPSULE ORAL at 21:14

## 2019-04-23 RX ADMIN — Medication 10 ML: at 09:06

## 2019-04-23 RX ADMIN — OXAZEPAM 15 MG: 15 CAPSULE ORAL at 13:44

## 2019-04-23 RX ADMIN — OXAZEPAM 15 MG: 15 CAPSULE ORAL at 21:14

## 2019-04-23 RX ADMIN — ASPIRIN 325 MG: 325 TABLET, COATED ORAL at 09:05

## 2019-04-23 RX ADMIN — LEVOTHYROXINE SODIUM 100 MCG: 100 TABLET ORAL at 06:02

## 2019-04-23 RX ADMIN — CETIRIZINE HYDROCHLORIDE 5 MG: 5 TABLET ORAL at 09:05

## 2019-04-23 RX ADMIN — CALCITRIOL 0.25 MCG: 0.25 CAPSULE ORAL at 09:05

## 2019-04-23 RX ADMIN — OXYCODONE AND ACETAMINOPHEN 2 TABLET: 5; 325 TABLET ORAL at 13:44

## 2019-04-23 RX ADMIN — INSULIN LISPRO 3 UNITS: 100 INJECTION, SOLUTION INTRAVENOUS; SUBCUTANEOUS at 12:54

## 2019-04-23 RX ADMIN — ENOXAPARIN SODIUM 30 MG: 30 INJECTION SUBCUTANEOUS at 09:05

## 2019-04-23 RX ADMIN — OXAZEPAM 15 MG: 15 CAPSULE ORAL at 09:03

## 2019-04-23 RX ADMIN — TRAZODONE HYDROCHLORIDE 150 MG: 50 TABLET ORAL at 21:14

## 2019-04-23 RX ADMIN — DORZOLAMIDE HYDROCHLORIDE 1 DROP: 20 SOLUTION/ DROPS OPHTHALMIC at 09:07

## 2019-04-23 RX ADMIN — DORZOLAMIDE HYDROCHLORIDE 1 DROP: 20 SOLUTION/ DROPS OPHTHALMIC at 21:17

## 2019-04-23 RX ADMIN — Medication 2 PUFF: at 08:09

## 2019-04-23 RX ADMIN — FERROUS SULFATE TAB 325 MG (65 MG ELEMENTAL FE) 325 MG: 325 (65 FE) TAB at 09:05

## 2019-04-23 RX ADMIN — CITALOPRAM HYDROBROMIDE 20 MG: 20 TABLET ORAL at 09:05

## 2019-04-23 RX ADMIN — ATORVASTATIN CALCIUM 80 MG: 80 TABLET, FILM COATED ORAL at 21:14

## 2019-04-23 RX ADMIN — FERROUS SULFATE TAB 325 MG (65 MG ELEMENTAL FE) 325 MG: 325 (65 FE) TAB at 17:50

## 2019-04-23 RX ADMIN — OXYCODONE AND ACETAMINOPHEN 2 TABLET: 5; 325 TABLET ORAL at 17:53

## 2019-04-23 RX ADMIN — PANTOPRAZOLE SODIUM 40 MG: 40 TABLET, DELAYED RELEASE ORAL at 21:14

## 2019-04-23 RX ADMIN — OXYCODONE AND ACETAMINOPHEN 2 TABLET: 5; 325 TABLET ORAL at 09:03

## 2019-04-23 RX ADMIN — TORSEMIDE 40 MG: 20 TABLET ORAL at 09:05

## 2019-04-23 RX ADMIN — PANTOPRAZOLE SODIUM 40 MG: 40 TABLET, DELAYED RELEASE ORAL at 09:05

## 2019-04-23 RX ADMIN — Medication 25.6 MILLICURIE: at 09:16

## 2019-04-23 RX ADMIN — LATANOPROST 1 DROP: 50 SOLUTION OPHTHALMIC at 21:17

## 2019-04-23 RX ADMIN — GABAPENTIN 100 MG: 100 CAPSULE ORAL at 09:05

## 2019-04-23 RX ADMIN — OXYCODONE AND ACETAMINOPHEN 2 TABLET: 5; 325 TABLET ORAL at 03:56

## 2019-04-23 RX ADMIN — Medication 2 PUFF: at 20:31

## 2019-04-23 ASSESSMENT — PAIN SCALES - GENERAL
PAINLEVEL_OUTOF10: 9
PAINLEVEL_OUTOF10: 9
PAINLEVEL_OUTOF10: 8
PAINLEVEL_OUTOF10: 10
PAINLEVEL_OUTOF10: 8
PAINLEVEL_OUTOF10: 0
PAINLEVEL_OUTOF10: 7

## 2019-04-23 ASSESSMENT — PAIN DESCRIPTION - PAIN TYPE
TYPE: ACUTE PAIN

## 2019-04-23 ASSESSMENT — PAIN DESCRIPTION - DESCRIPTORS
DESCRIPTORS: CONSTANT;DISCOMFORT
DESCRIPTORS: CONSTANT;DISCOMFORT;ACHING

## 2019-04-23 ASSESSMENT — PAIN DESCRIPTION - LOCATION
LOCATION: ARM

## 2019-04-23 ASSESSMENT — PAIN DESCRIPTION - PROGRESSION
CLINICAL_PROGRESSION: GRADUALLY IMPROVING
CLINICAL_PROGRESSION: NOT CHANGED

## 2019-04-23 ASSESSMENT — PAIN DESCRIPTION - ONSET
ONSET: ON-GOING

## 2019-04-23 ASSESSMENT — PAIN DESCRIPTION - ORIENTATION
ORIENTATION: LEFT

## 2019-04-23 ASSESSMENT — PAIN DESCRIPTION - FREQUENCY
FREQUENCY: CONTINUOUS

## 2019-04-23 ASSESSMENT — PAIN - FUNCTIONAL ASSESSMENT
PAIN_FUNCTIONAL_ASSESSMENT: INTOLERABLE, UNABLE TO DO ANY ACTIVE OR PASSIVE ACTIVITIES

## 2019-04-23 NOTE — PLAN OF CARE
Patient's EF (Ejection Fraction) is greater than 40%    Patient has a past medical history of Anxiety, CAD (coronary artery disease), Cancer ([facility] [address] CHF (congestive heart failure) ([facility] [address] Depression, Diabetes mellitus ([facility] [address] Glaucoma, Hyperlipidemia, Hypertension, Macular degeneration, Thyroid disease, and TIA (transient ischemic attack). Comorbidities reviewed and education provided. Patient and family's stated goal of care: reduce shortness of breath, increase activity tolerance, better understand heart failure and disease management, be more comfortable and reduce lower extremity edema prior to discharge    Patient's current functional capacity:  Marked limitation of physical activity. Comfortable at rest. Less than ordinary activity causes fatigue, palpitation, or dyspnea. Pt resting in bed at this time on room air. Pt denies shortness of breath. Pt with nonpitting lower extremity edema.  Patient's weights and intake/output reviewed:    Patient Vitals for the past 96 hrs (Last 3 readings):   Weight   04/22/19 0634 137 lb 14.4 oz (62.6 kg)   04/21/19 0627 138 lb 1.6 oz (62.6 kg)   04/20/19 0656 132 lb 8 oz (60.1 kg)       Intake/Output Summary (Last 24 hours) at 4/23/2019 0225  Last data filed at 4/22/2019 2300  Gross per 24 hour   Intake 1690 ml   Output 2250 ml   Net -560 ml         >>For CHF and Comorbidity documentation on Education Time and Topics, please see Education Tab

## 2019-04-23 NOTE — PROGRESS NOTES
Patient is awake, alert and oriented x4. Assessment is complete, see flow sheet. Bed in lowest position, wheels locked, call light is within reach. Patient denies any further needs at the moment. Will continue to monitor.     /67   Pulse 79   Temp 98.4 °F (36.9 °C) (Oral)   Resp 14   Ht 5' 1\" (1.549 m)   Wt 138 lb 11.2 oz (62.9 kg)   SpO2 93%   BMI 26.21 kg/m²     Blood Glucose 124  Pain 10/10

## 2019-04-23 NOTE — PROGRESS NOTES
1. 3* 1.3*     No results for input(s): AST, ALT, ALB, BILIDIR, BILITOT, ALKPHOS in the last 72 hours. Magnesium:    Lab Results   Component Value Date    MG 2.00 04/22/2019    MG 2.00 04/21/2019    MG 2.00 04/20/2019     EXAMINATION:   CT OF THE CHEST WITHOUT CONTRAST 4/22/2019 8:47 pm       TECHNIQUE:   CT of the chest was performed without the administration of intravenous   contrast. Multiplanar reformatted images are provided for review. Dose   modulation, iterative reconstruction, and/or weight based adjustment of the   mA/kV was utilized to reduce the radiation dose to as low as reasonably   achievable.       COMPARISON:   None.       HISTORY:   ORDERING SYSTEM PROVIDED HISTORY: LUNG CANCER, NON-SMALL CELL, STAGING   TECHNOLOGIST PROVIDED HISTORY:   Ordering Physician Provided Reason for Exam: Lung cancer, non-small cell,   staging   Acuity: Acute   Type of Exam: Initial       FINDINGS:   Mediastinum: Vascular calcifications are noted.  Enlargement of the pulmonary   arteries is noted suggesting pulmonary arterial hypertension.  Precarinal   lymph node measures approximately 13 x 9 mm.  There is poor visualization of   the fat pad in the region of the AP window likely due to tortuosity and   enlargement of the pulmonary artery and volume averaging.  Wall thickening in   the distal 3rd of the esophagus would be difficult to exclude.       Lungs/pleura: Partial lower lobe consolidation is noted bilaterally with   multiple air bronchograms.  Ground-glass density in the medial aspect of the   left upper lobe is noted.  3.5 mm nodular density in the left upper lobe is   noted on image 26 with ill-defined margins.  Focal airspace opacity in the   posterior inferior left upper lobe is noted centered between axial images 47   and 51.  Mild ground-glass density in the surrounding parenchyma is noted.    Lobular opacity in the right lower lobe is noted posterior to the major   fissure on axial image 39 measuring approximately 14 x 12 mm.  Ground-glass   density in the posterior aspect of the right upper lobe is noted on axial   image 25 through 30.  Ground-glass density in the anterior right lung is   noted on image 40.  There is also partial consolidation of the posterior   inferior aspect of the right middle lobe.  Lobular density in the right   middle lobe is noted on image 50 measuring approximately 7.9 mm long axis.       Upper Abdomen: Detail in the upper abdomen is significantly limited due to   artifact.  Vascular calcifications are noted.  Subtle low-density enlargement   of the left adrenal gland is noted.       Soft Tissues/Bones: Sternotomy wires are noted.  Degenerative changes   throughout the spine are noted.  Osteopenia is noted.  Compression fracture   deformity in the thoracic spine is noted seen best on the sagittal   reformatted images.  This is at approximately T7.  Right greater than left   shoulder degenerative changes are noted.       In the right lower cervical region, there is lobular intermediate density   centered on image 12.  This may represent focal enlargement of the jugular   vein, however a lymph node at this location would be difficult to exclude   completely.           Impression   Multifocal airspace disease bilaterally, age indeterminate.  This could   represent chronic atelectasis, however pneumonia should be considered as well       Multiple nodular opacities as described. Leanor Gores the history, neoplastic   nodule should be considered.       Lobular low-density in the right lower cervical region as described. Correlate with ultrasound or follow-up CT with contrast.       Age-indeterminate compression fracture in the thoracic spine.       Small hiatal hernia.  Wall thickening in the distal esophagus would be   difficult to exclude       Age-indeterminate low-density left adrenal gland enlargement.          Problem List  Patient Active Problem List   Diagnosis    Acute on chronic diastolic congestive heart failure (HCC)    Pleural effusion    Coronary artery disease involving native heart without angina pectoris    Non-rheumatic mitral regurgitation    Bilateral carotid artery stenosis    Mixed hyperlipidemia    Acute on chronic diastolic CHF (congestive heart failure) (HCC)    Diabetes mellitus (HCC)    Benign essential HTN    Closed fracture of left humerus       ASSESSMENT AND PLAN:    Possible pathological fx to L humerus  - CT chest reviewed with multiple pulm nodules; however, each nodule is less than 1 cm making it hard to biopsy and less likely malignant   - Bone scan today is pending  - Patient would like aggressive work up of possible cancer. Will order CA 27-29 and CA 15-3   - Creat is elevated at 1.3. Will add myeloma labs as well. Check CMP to include albumin and protein levels. Calcium is 8.8; however, I would like to see this corrected after Albumin levels are obtained. - CT Chest is above- compression fx to T7; distal esophageal thickening. Could consider upper EGD if patient desires. - No family in room this AM; can come back to speak with family later today prior to 2 pm. I will also call son/daughter this AM as well. ONCOLOGIC DISPOSITION: TBD, await scans and possible bx for malignancy work up.        Alejandro Javier CNP  [facility]  Please contact through [address]

## 2019-04-23 NOTE — PROGRESS NOTES
Hospitalist Progress Note      PCP: Mikhail Zazueta    Date of Admission: 4/17/2019    Chief Complaint:  LUE pain, dyspnea    Hospital Course: \"80 y.o. female who presented to [facility] with subacute onset of pain in left arm for last 3 weeks, also noted gradually worsening shortness of breath on exertion and increasing swelling on lower extremity. Patient denies any accidental fall or injury, fever, chest pain, cough or congestion, abdominal pain, nausea or vomiting. In ER workup patient was found possible pathological fracture on left lower end of the humerus and CHF. \"       Subjective:  She says she feels well. When asked, she does endorse LUE pain. No dyspnea. Cr above baseline, will skip diuretic dose tomorrow. Bone scan this afternoon.       Medications:  Reviewed    Infusion Medications    dextrose       Scheduled Medications    [START ON 4/25/2019] torsemide  20 mg Oral Daily    atorvastatin  80 mg Oral Nightly    aspirin  325 mg Oral Daily    latanoprost  1 drop Ophthalmic Daily    citalopram  20 mg Oral Daily    calcitRIOL  0.25 mcg Oral Once per day on Tue Fri    dorzolamide  1 drop Both Eyes BID    cetirizine  5 mg Oral Daily    traZODone  150 mg Oral Nightly    insulin lispro  6 Units Subcutaneous BID    insulin glargine  12 Units Subcutaneous Nightly    ferrous sulfate  325 mg Oral BID WC    pantoprazole  40 mg Oral BID    gabapentin  100 mg Oral BID    levothyroxine  100 mcg Oral Daily    mometasone-formoterol  2 puff Inhalation BID    sodium chloride flush  10 mL Intravenous 2 times per day    enoxaparin  30 mg Subcutaneous Daily    insulin lispro  0-6 Units Subcutaneous TID WC    insulin lispro  0-3 Units Subcutaneous Nightly    oxazepam  15 mg Oral TID     PRN Meds: oxyCODONE-acetaminophen, oxyCODONE-acetaminophen, prochlorperazine, nitroGLYCERIN, sodium chloride flush, magnesium hydroxide, glucose, dextrose, glucagon (rDNA), dextrose      Intake/Output Summary (Last 24 hours) at 4/23/2019 1209  Last data filed at 4/23/2019 1037  Gross per 24 hour   Intake 1570 ml   Output 3025 ml   Net -1455 ml       Physical Exam Performed:    BP (!) 94/43   Pulse 77   Temp 98.5 °F (36.9 °C) (Oral)   Resp 16   Ht 5' 1\" (1.549 m)   Wt 138 lb 11.2 oz (62.9 kg)   SpO2 90%   BMI 26.21 kg/m²     General appearance: No apparent distress, appears stated age and cooperative. HEENT: Pupils equal, round, and reactive to light. Conjunctivae/corneas clear. Very hard of hearing. Neck: Supple, with full range of motion. No jugular venous distention. Trachea midline. Respiratory:  Normal respiratory effort. Clear to auscultation, bilaterally without Rales/Wheezes/Rhonchi. Diminished in bases. Cardiovascular: Regular rate and rhythm with normal S1/S2 without murmurs, rubs or gallops. Abdomen: Soft, non-tender, non-distended with normal bowel sounds. Musculoskeletal: No clubbing, cyanosis or edema bilaterally. LUE in sling. Skin: Skin color, texture, turgor normal.  No rashes or lesions. Neurologic:  Neurovascularly intact without any focal sensory/motor deficits. Cranial nerves: II-XII intact, grossly non-focal.  Psychiatric: Alert and oriented, thought content appropriate, limited insight. Capillary Refill: Brisk,< 3 seconds   Peripheral Pulses: +2 palpable, equal bilaterally       Labs:   Recent Labs     04/23/19  0725   WBC 4.6   HGB 12.2   HCT 36.2        Recent Labs     04/22/19  0730 04/23/19  0725 04/23/19  0919    135* 134*   K 4.4 4.4 4.1   CL 93* 91* 90*   CO2 35* 37* 34*   BUN 21* 27* 26*   CREATININE 1.3* 1.5* 1.5*   CALCIUM 8.8 9.1 8.8     Recent Labs     04/23/19  0919   AST 18   ALT 12   BILITOT 0.5   ALKPHOS 77     No results for input(s): INR in the last 72 hours. No results for input(s): [place] in the last 72 hours.     Urinalysis:      Lab Results   Component Value Date    NITRU Negative 01/06/2019    WBCUA 0-2 12/28/2018 BACTERIA 4+ 12/28/2018    RBCUA 0-2 12/28/2018    BLOODU Negative 01/06/2019    SPECGRAV 1.015 01/06/2019    GLUCOSEU Negative 01/06/2019       Radiology:  CT CHEST WO CONTRAST   Final Result   Multifocal airspace disease bilaterally, age indeterminate. This could   represent chronic atelectasis, however pneumonia should be considered as well      Multiple nodular opacities as described. Given the history, neoplastic   nodule should be considered. Lobular low-density in the right lower cervical region as described. Correlate with ultrasound or follow-up CT with contrast.      Age-indeterminate compression fracture in the thoracic spine. Small hiatal hernia. Wall thickening in the distal esophagus would be   difficult to exclude      Age-indeterminate low-density left adrenal gland enlargement. XR CHEST PORTABLE   Final Result   Mild heart failure with slightly improved pulmonary edema and unchanged left   pleural effusion. No acute osseous abnormality. XR HUMERUS LEFT (MIN 2 VIEWS)   Final Result   Distal humeral fracture, presumably pathologic as it occurs in an area   demonstrating subtle lytic irregularity suggesting underlying bone lesion. NM BONE SCAN WHOLE BODY    (Results Pending)           Assessment/Plan:    Active Hospital Problems    Diagnosis Date Noted    Coronary artery disease involving native heart without angina pectoris [I25.10]      Priority: High    Closed fracture of left humerus [S42.302A] 04/17/2019    Diabetes mellitus ([facility] [address] [E11.9] 01/04/2019    Benign essential HTN [I10] 01/04/2019    Acute on chronic diastolic congestive heart failure ([address] [I50.33] 06/19/2018       \"89 y.o. female who presented to [facility] with subacute onset of pain in left arm for last 3 weeks, also noted gradually worsening shortness of breath on exertion and increasing swelling on lower extremity.   Patient denies any accidental fall or injury, fever, chest pain, cough or congestion, abdominal pain, nausea or vomiting. In ER workup patient was found possible pathological fracture on left lower end of the humerus and CHF. \"      LUE fracture, possibly pathologic due to unknown malignancy  - analgesia. - ortho decided the risks of surgery outweigh the benefits. F/u in clinic for repeat Xray  - oncology consulted.    - PT/OT, patient and family want [facility]    Acute hypoxic respiratory failure  - likely due to atelectasis related to above issue. IS. Mobilize patient as tolerated. Acute on chronic diastolic CHF  - CXR on admission had possible mild CHF. She didn't respond much to diuresis, and developed GERALDINE. Patient said she no longer felt dyspneic. Held diuresis on 4/24.  - carvedilol. No ACE/ARB due to renal instability. GERALDINE  - likely due to diuresis and cardiorenal syndrome. Held diuresis as above. - baseline Cr is probably normal.    CAD  - aspirin, statin, carvedilol    DM2  - adjusted insulin regimen      DVT Prophylaxis: enoxaparin  Diet: DIET CARB CONTROL; No Added Salt (3-4 GM)  Code Status: Full Code    PT/OT Eval Status: rec'd SNF    Dispo - perhaps medically ready for discharge 4/24 if Cr improves, pending bone scan results. She wants SNF.       Wale Sim MD

## 2019-04-23 NOTE — PLAN OF CARE
Problem: Pain:  Goal: Control of acute pain  Description  Control of acute pain  Outcome: Ongoing  Note:   Patient's pain level is uncontrolled at this time, 10/10 aching/throbbing/discomfort in L arm. 2 percocet and serax given. Will continue to monitor. Problem: Falls - Risk of:  Goal: Will remain free from falls  Description  Will remain free from falls  Outcome: Ongoing  Note:   Pt high fall risk. Instructed to use call light before getting out of bed. Assist x1 to [facility]. Call light within reach. Bed in low position. Bed alarm on. Will continue to monitor.

## 2019-04-23 NOTE — CARE COORDINATION
CM spoke to Rene Samano with [facility] to confirm pts admission at d/c. Rene Samano stated they can accept pt when medically stable for d/c. Writer did update pt at bedside. Will follow.

## 2019-04-23 NOTE — PLAN OF CARE
Diabetes education provided today:    Carbs: good carbs and bad carbs, importance of carb counting, incorporation of protein with each meal to reduce Glycemic index, importance of portions, Carb/insulin ratio.

## 2019-04-23 NOTE — PLAN OF CARE
Good fats and bad fats, meal planning and supplements. Physical activity: advised to exercise 5-7 days a week 30-60 mins at least. Discussed how it affects BS readings. Managing high and low sugar readings.

## 2019-04-23 NOTE — PLAN OF CARE
Patient's EF (Ejection Fraction) is greater than 40%    Patient has a past medical history of Anxiety, CAD (coronary artery disease), Cancer ([facility] [address] CHF (congestive heart failure) ([facility] [address] Depression, Diabetes mellitus ([facility] [address] Glaucoma, Hyperlipidemia, Hypertension, Macular degeneration, Thyroid disease, and TIA (transient ischemic attack). Comorbidities reviewed and education provided. Patient and family's stated goal of care: reduce shortness of breath, increase activity tolerance, better understand heart failure and disease management, be more comfortable and reduce lower extremity edema prior to discharge    Patient's current functional capacity:  Slight limitation of physical activity. Comfortable at rest. Ordinary physical activity results in fatigue, palpitation, dyspnea. Pt resting in bed at this time on 1 L O2. Pt denies shortness of breath. Pt with pitting lower extremity edema. Patient's weights and intake/output reviewed:    Patient Vitals for the past 96 hrs (Last 3 readings):   Weight   04/23/19 0613 138 lb 11.2 oz (62.9 kg)   04/22/19 0634 137 lb 14.4 oz (62.6 kg)   04/21/19 0627 138 lb 1.6 oz (62.6 kg)       Intake/Output Summary (Last 24 hours) at 4/23/2019 0755  Last data filed at 4/23/2019 0605  Gross per 24 hour   Intake 1690 ml   Output 2700 ml   Net -1010 ml         >>For CHF and Comorbidity documentation on Education Time and Topics, please see Education Tab    Diabetes education provided today:    Diabetic Neuropathy: signs and therapy. Foot care: advised to wash feet daily, pat dry and apply lotion at night, avoiding between toes. Need to look at feet daily and report to a physician any signs of inflammation or skin damage. Discussed diabetes shoes and socks. Nutrition as a mainstream of diabetes therapy. Phillips about label reading.   Carbs: good carbs and bad carbs, importance of carb counting, incorporation of protein with each meal to reduce Glycemic index, importance of portions, Carb/insulin ratio. Fats: Good fats and bad fats, meal planning and supplements. Physical activity: advised to exercise 5-7 days a week 30-60 mins at least. Discussed how it affects BS readings. Managing high and low sugar readings.

## 2019-04-23 NOTE — PROGRESS NOTES
Occupational Therapy  Facility/Department: [facility] A2 CARD TELEMETRY  Daily Treatment Note  NAME: Jose Bob  : 1928  MRN: [id number]    Date of Service: 2019    Discharge Recommendations:  Subacute/Skilled Nursing Facility  OT Equipment Recommendations  Other: defer to next level of care    Assessment   Performance deficits / Impairments: Decreased functional mobility ; Decreased ADL status; Decreased endurance;Decreased balance;Decreased high-level IADLs;Decreased safe awareness  Assessment: Pt agreeable to therapy and wanted to get out of bed. Pt required Mod A for functional mobility/transfers using a gait belt. Pt educated on proper sling positioning and arm positioning while seated. Pt repositioned for comfort in chair at end of session. Continue skilled OT per POC. Prognosis: Good  Decision Making: High Complexity  Patient Education: role of OT, transfers, safety   REQUIRES OT FOLLOW UP: Yes  Activity Tolerance  Activity Tolerance: Patient limited by pain  Activity Tolerance: Pt with c/o pain in LUE worsening with mobility  Safety Devices  Safety Devices in place: Yes  Type of devices: Call light within reach; Left in chair;Nurse notified; Chair alarm in place;Gait belt       Patient Diagnosis(es): The primary encounter diagnosis was Other closed nondisplaced fracture of distal end of left humerus, initial encounter. Diagnoses of Fall, initial encounter and Acute on chronic congestive heart failure, unspecified heart failure type [facility]) were also pertinent to this visit. has a past medical history of Anxiety, CAD (coronary artery disease), Cancer ([facility] [address] CHF (congestive heart failure) ([facility] [address] Depression, Diabetes mellitus ([facility] [address] Glaucoma, Hyperlipidemia, Hypertension, Macular degeneration, Thyroid disease, and TIA (transient ischemic attack). has a past surgical history that includes Cholecystectomy; Cardiac surgery; Thyroidectomy;  Spine surgery; and Coronary artery bypass assess(pt in chair at end of session)  Scooting: Contact guard assistance  Transfers  Sit to stand: Moderate assistance  Stand to sit: Moderate assistance     Cognition  Overall Cognitive Status: Exceptions  Arousal/Alertness: Appropriate responses to stimuli  Following Commands:  Follows one step commands with increased time  Attention Span: Attends with cues to redirect  Memory: Decreased recall of recent events  Problem Solving: Assistance required to correct errors made;Assistance required to identify errors made  Initiation: Requires cues for some  Sequencing: Does not require cues     LUE AROM (degrees)  LUE General AROM: n/a  RUE AROM (degrees)  RUE AROM : WFL  RUE General AROM: limited to 45 * shoulder flexion      Plan   Plan  Times per week: 3-5x  Current Treatment Recommendations: Strengthening, Endurance Training, Patient/Caregiver Education & Training, Self-Care / ADL, ROM, Balance Training, Positioning, Functional Mobility Training, Safety Education & Training    AM-PAC Score     AM-PAC Inpatient Daily Activity Raw Score: 10  AM-PAC Inpatient ADL T-Scale Score : 27.31  ADL Inpatient CMS 0-100% Score: 74.7  ADL Inpatient CMS G-Code Modifier : CL    Goals  Short term goals  Time Frame for Short term goals: for 1 week or otherwise specified  Short term goal 1: Perform UE dressing with min A by 4/23  Short term goal 2: Perform functional transfer to chair/commode with CGA by 4/19; progressing continue to 4/25/19  Short term goal 3: Perform toileting with mod A  Patient Goals   Patient goals : Pt did not state     Therapy Time   Individual Concurrent Group Co-treatment   Time In 1010         Time Out 1039         Minutes 29         Timed Code Treatment Minutes: [address], OTR/L

## 2019-04-23 NOTE — CARE COORDINATION
Case Management/Follow up:    Chart reviewed for length of stay. Hospital day #6   Unit:  A2    Diagnosis and current status as per MD progress:     LUE fracture, possibly pathologic due to unknown malignancy  - analgesia. - ortho decided the risks of surgery outweigh the benefits. F/u in clinic for repeat Xray  - oncology consulted.    - PT/OT, patient and family want [facility]     Acute hypoxic respiratory failure  - likely due to atelectasis related to above issue. IS. Mobilize patient as tolerated.      Acute on chronic diastolic CHF  - CXR on admission had possible mild CHF. She didn't respond much to diuresis, and developed GERALDINE. Patient said she no longer felt dyspneic. Held diuresis on 4/24.  - carvedilol. No ACE/ARB due to renal instability.      GERALDINE  - likely due to diuresis and cardiorenal syndrome. Held diuresis as above. - baseline Cr is probably normal.        Anticipated d/c date:perhaps medically ready for discharge 4/24 if Cr improves, pending bone scan results. She wants SNF. Expected plan for discharge: [facility] skilled. Potential barriers:none    Precert required/date initiated:N/A    Confirmed plan with patient and/or family: Confirmed with facility today and met with pt at bedside.

## 2019-04-23 NOTE — PROGRESS NOTES
End of shift report given to Ariella Quinteros RN at bedside. Patient alert and oriented. Bed in lowest position with wheels locked. Call light within reach.  No further needs at this time. [facility]

## 2019-04-23 NOTE — PROGRESS NOTES
Daughter, Varsha Torres here at bedside with patient. Was under impression oncology CNP would be here to discuss pt's care by 2pm today. Daughter Varsha Soniaamador, would like to be called tomorrow to discuss. Varsha Torres says anytime to call is fine.

## 2019-04-24 LAB
ALBUMIN SERPL-MCNC: 3 G/DL (ref 3.1–4.9)
ALPHA-1-GLOBULIN: 0.4 G/DL (ref 0.2–0.4)
ALPHA-2-GLOBULIN: 0.9 G/DL (ref 0.4–1.1)
ANION GAP SERPL CALCULATED.3IONS-SCNC: 10 MMOL/L (ref 3–16)
BETA GLOBULIN: 0.9 G/DL (ref 0.9–1.6)
BUN BLDV-MCNC: 28 MG/DL (ref 7–20)
CA 15-3: 65 U/ML (ref 0–31)
CALCIUM SERPL-MCNC: 9.1 MG/DL (ref 8.3–10.6)
CHLORIDE BLD-SCNC: 92 MMOL/L (ref 99–110)
CO2: 32 MMOL/L (ref 21–32)
CREAT SERPL-MCNC: 1.5 MG/DL (ref 0.6–1.2)
GAMMA GLOBULIN: 0.8 G/DL (ref 0.6–1.8)
GFR AFRICAN AMERICAN: 39
GFR NON-AFRICAN AMERICAN: 33
GLUCOSE BLD-MCNC: 140 MG/DL (ref 70–99)
GLUCOSE BLD-MCNC: 157 MG/DL (ref 70–99)
GLUCOSE BLD-MCNC: 164 MG/DL (ref 70–99)
GLUCOSE BLD-MCNC: 173 MG/DL (ref 70–99)
GLUCOSE BLD-MCNC: 69 MG/DL (ref 70–99)
GLUCOSE BLD-MCNC: 78 MG/DL (ref 70–99)
KAPPA, FREE LIGHT CHAINS, SERUM: 49.5 MG/L (ref 3.3–19.4)
KAPPA/LAMBDA RATIO: 1.89 (ref 0.26–1.65)
KAPPA/LAMBDA TEST COMMENT: ABNORMAL
LAMBDA, FREE LIGHT CHAINS, SERUM: 26.13 MG/L (ref 5.71–26.3)
PERFORMED ON: ABNORMAL
PERFORMED ON: NORMAL
POTASSIUM SERPL-SCNC: 3.7 MMOL/L (ref 3.5–5.1)
SODIUM BLD-SCNC: 134 MMOL/L (ref 136–145)
SPE/IFE INTERPRETATION: NORMAL

## 2019-04-24 PROCEDURE — 97530 THERAPEUTIC ACTIVITIES: CPT

## 2019-04-24 PROCEDURE — 1200000000 HC SEMI PRIVATE

## 2019-04-24 PROCEDURE — 2580000003 HC RX 258: Performed by: INTERNAL MEDICINE

## 2019-04-24 PROCEDURE — 80048 BASIC METABOLIC PNL TOTAL CA: CPT

## 2019-04-24 PROCEDURE — 97110 THERAPEUTIC EXERCISES: CPT

## 2019-04-24 PROCEDURE — 6360000002 HC RX W HCPCS: Performed by: INTERNAL MEDICINE

## 2019-04-24 PROCEDURE — 94761 N-INVAS EAR/PLS OXIMETRY MLT: CPT

## 2019-04-24 PROCEDURE — 94640 AIRWAY INHALATION TREATMENT: CPT

## 2019-04-24 PROCEDURE — 97116 GAIT TRAINING THERAPY: CPT

## 2019-04-24 PROCEDURE — 6370000000 HC RX 637 (ALT 250 FOR IP): Performed by: INTERNAL MEDICINE

## 2019-04-24 PROCEDURE — 36415 COLL VENOUS BLD VENIPUNCTURE: CPT

## 2019-04-24 PROCEDURE — 2700000000 HC OXYGEN THERAPY PER DAY

## 2019-04-24 RX ORDER — 0.9 % SODIUM CHLORIDE 0.9 %
500 INTRAVENOUS SOLUTION INTRAVENOUS ONCE
Status: COMPLETED | OUTPATIENT
Start: 2019-04-24 | End: 2019-04-24

## 2019-04-24 RX ORDER — TORSEMIDE 20 MG/1
20 TABLET ORAL DAILY
Status: DISCONTINUED | OUTPATIENT
Start: 2019-04-26 | End: 2019-04-25 | Stop reason: HOSPADM

## 2019-04-24 RX ADMIN — OXYCODONE AND ACETAMINOPHEN 2 TABLET: 5; 325 TABLET ORAL at 11:22

## 2019-04-24 RX ADMIN — OXAZEPAM 15 MG: 15 CAPSULE ORAL at 16:15

## 2019-04-24 RX ADMIN — Medication 10 ML: at 09:38

## 2019-04-24 RX ADMIN — Medication 10 ML: at 23:05

## 2019-04-24 RX ADMIN — FERROUS SULFATE TAB 325 MG (65 MG ELEMENTAL FE) 325 MG: 325 (65 FE) TAB at 09:31

## 2019-04-24 RX ADMIN — INSULIN LISPRO 6 UNITS: 100 INJECTION, SOLUTION INTRAVENOUS; SUBCUTANEOUS at 09:38

## 2019-04-24 RX ADMIN — DORZOLAMIDE HYDROCHLORIDE 1 DROP: 20 SOLUTION/ DROPS OPHTHALMIC at 21:21

## 2019-04-24 RX ADMIN — SODIUM CHLORIDE 500 ML: 9 INJECTION, SOLUTION INTRAVENOUS at 13:20

## 2019-04-24 RX ADMIN — LEVOTHYROXINE SODIUM 100 MCG: 100 TABLET ORAL at 06:44

## 2019-04-24 RX ADMIN — OXYCODONE AND ACETAMINOPHEN 2 TABLET: 5; 325 TABLET ORAL at 06:44

## 2019-04-24 RX ADMIN — ASPIRIN 325 MG: 325 TABLET, COATED ORAL at 09:31

## 2019-04-24 RX ADMIN — OXAZEPAM 15 MG: 15 CAPSULE ORAL at 22:56

## 2019-04-24 RX ADMIN — INSULIN LISPRO 1 UNITS: 100 INJECTION, SOLUTION INTRAVENOUS; SUBCUTANEOUS at 21:17

## 2019-04-24 RX ADMIN — CETIRIZINE HYDROCHLORIDE 5 MG: 5 TABLET ORAL at 09:31

## 2019-04-24 RX ADMIN — TRAZODONE HYDROCHLORIDE 150 MG: 50 TABLET ORAL at 22:57

## 2019-04-24 RX ADMIN — DORZOLAMIDE HYDROCHLORIDE 1 DROP: 20 SOLUTION/ DROPS OPHTHALMIC at 09:34

## 2019-04-24 RX ADMIN — CITALOPRAM HYDROBROMIDE 20 MG: 20 TABLET ORAL at 09:31

## 2019-04-24 RX ADMIN — INSULIN LISPRO 1 UNITS: 100 INJECTION, SOLUTION INTRAVENOUS; SUBCUTANEOUS at 17:33

## 2019-04-24 RX ADMIN — LATANOPROST 1 DROP: 50 SOLUTION OPHTHALMIC at 21:21

## 2019-04-24 RX ADMIN — Medication 2 PUFF: at 19:26

## 2019-04-24 RX ADMIN — OXAZEPAM 15 MG: 15 CAPSULE ORAL at 11:22

## 2019-04-24 RX ADMIN — GABAPENTIN 100 MG: 100 CAPSULE ORAL at 22:57

## 2019-04-24 RX ADMIN — PANTOPRAZOLE SODIUM 40 MG: 40 TABLET, DELAYED RELEASE ORAL at 09:31

## 2019-04-24 RX ADMIN — GABAPENTIN 100 MG: 100 CAPSULE ORAL at 09:32

## 2019-04-24 RX ADMIN — OXYCODONE AND ACETAMINOPHEN 2 TABLET: 5; 325 TABLET ORAL at 21:27

## 2019-04-24 RX ADMIN — ATORVASTATIN CALCIUM 80 MG: 80 TABLET, FILM COATED ORAL at 21:20

## 2019-04-24 RX ADMIN — INSULIN LISPRO 6 UNITS: 100 INJECTION, SOLUTION INTRAVENOUS; SUBCUTANEOUS at 17:33

## 2019-04-24 RX ADMIN — FERROUS SULFATE TAB 325 MG (65 MG ELEMENTAL FE) 325 MG: 325 (65 FE) TAB at 16:19

## 2019-04-24 RX ADMIN — PANTOPRAZOLE SODIUM 40 MG: 40 TABLET, DELAYED RELEASE ORAL at 21:20

## 2019-04-24 RX ADMIN — INSULIN LISPRO 1 UNITS: 100 INJECTION, SOLUTION INTRAVENOUS; SUBCUTANEOUS at 09:38

## 2019-04-24 RX ADMIN — INSULIN GLARGINE 12 UNITS: 100 INJECTION, SOLUTION SUBCUTANEOUS at 21:17

## 2019-04-24 RX ADMIN — OXYCODONE AND ACETAMINOPHEN 2 TABLET: 5; 325 TABLET ORAL at 16:17

## 2019-04-24 RX ADMIN — ENOXAPARIN SODIUM 30 MG: 30 INJECTION SUBCUTANEOUS at 09:32

## 2019-04-24 ASSESSMENT — PAIN SCALES - GENERAL
PAINLEVEL_OUTOF10: 8
PAINLEVEL_OUTOF10: 8
PAINLEVEL_OUTOF10: 9
PAINLEVEL_OUTOF10: 8
PAINLEVEL_OUTOF10: 10
PAINLEVEL_OUTOF10: 9
PAINLEVEL_OUTOF10: 0

## 2019-04-24 ASSESSMENT — PAIN DESCRIPTION - PROGRESSION
CLINICAL_PROGRESSION: NOT CHANGED

## 2019-04-24 ASSESSMENT — PAIN - FUNCTIONAL ASSESSMENT
PAIN_FUNCTIONAL_ASSESSMENT: INTOLERABLE, UNABLE TO DO ANY ACTIVE OR PASSIVE ACTIVITIES

## 2019-04-24 ASSESSMENT — PAIN DESCRIPTION - FREQUENCY
FREQUENCY: CONTINUOUS

## 2019-04-24 ASSESSMENT — PAIN DESCRIPTION - LOCATION
LOCATION: ARM

## 2019-04-24 ASSESSMENT — PAIN DESCRIPTION - DESCRIPTORS
DESCRIPTORS: CONSTANT;DISCOMFORT
DESCRIPTORS: CONSTANT;DISCOMFORT;SHOOTING
DESCRIPTORS: CONSTANT;DISCOMFORT;ACHING

## 2019-04-24 ASSESSMENT — PAIN DESCRIPTION - ONSET
ONSET: ON-GOING

## 2019-04-24 ASSESSMENT — PAIN DESCRIPTION - ORIENTATION
ORIENTATION: LEFT

## 2019-04-24 ASSESSMENT — PAIN DESCRIPTION - PAIN TYPE
TYPE: ACUTE PAIN

## 2019-04-24 ASSESSMENT — PAIN SCALES - WONG BAKER: WONGBAKER_NUMERICALRESPONSE: 0

## 2019-04-24 NOTE — PLAN OF CARE
Diabetes education provided today:    Sudden drops in blood sugar can impact overall health and well-being, even if the blood glucose reading is WNL.

## 2019-04-24 NOTE — PROGRESS NOTES
Occupational Therapy  Facility/Department: [facility] A2 CARD TELEMETRY  Daily Treatment Note  NAME: Patricia Albert  : 1928  MRN: [id number]    Date of Service: 2019    Discharge Recommendations:  Subacute/Skilled Nursing Facility  OT Equipment Recommendations  Other: defer to next level of care    Assessment   Performance deficits / Impairments: Decreased functional mobility ; Decreased ADL status; Decreased endurance;Decreased balance;Decreased high-level IADLs;Decreased safe awareness  Assessment: Pt agreeable to therapy. Pt required Mod A for sit/stand transfer from chair using a gait belt. Pt demonstrated RUE AROM therapeutic exercise to strengthen in prep for ADL completion and functional transfers. Pt educated on proper sling positioning and arm positioning while seated. Pt educated on importance of mobility, but declining further mobility this session 2/2 pain in LUE with any movement. Pt positioned for comfort in chair at end of session. Continue skilled OT per POC. Prognosis: Good  Decision Making: High Complexity  Patient Education: role of OT, transfers, safety   REQUIRES OT FOLLOW UP: Yes  Activity Tolerance  Activity Tolerance: Patient limited by pain  Activity Tolerance: Pt with c/o pain in LUE worsening with mobility and any movement  Safety Devices  Safety Devices in place: Yes  Type of devices: Call light within reach; Left in chair;Nurse notified; Chair alarm in place;Gait belt       Patient Diagnosis(es): The primary encounter diagnosis was Other closed nondisplaced fracture of distal end of left humerus, initial encounter. Diagnoses of Fall, initial encounter and Acute on chronic congestive heart failure, unspecified heart failure type [facility]) were also pertinent to this visit.       has a past medical history of Anxiety, CAD (coronary artery disease), Cancer ([facility] [address] CHF (congestive heart failure) ([facility] [address] Depression, Diabetes mellitus ([facility] [address] Glaucoma, Hyperlipidemia, Hypertension, Macular degeneration, Thyroid disease, and TIA (transient ischemic attack). has a past surgical history that includes Cholecystectomy; Cardiac surgery; Thyroidectomy; Spine surgery; and Coronary artery bypass graft. Restrictions  Restrictions/Precautions  Restrictions/Precautions: Weight Bearing, Fall Risk  Required Braces or Orthoses?: Yes  Upper Extremity Weight Bearing Restrictions  Right Upper Extremity Weight Bearing: Non Weight Bearing  Left Upper Extremity Weight Bearing: Non Weight Bearing  Required Braces or Orthoses  Left Upper Extremity Brace/Splint: Sling  Left Upper Extremity Brace/Splint: L long arm splint  Position Activity Restriction  Other position/activity restrictions: High fall risk, Up as tolerated  Subjective   General  Chart Reviewed: Yes  Patient assessed for rehabilitation services?: Yes  Family / Caregiver Present: No  Referring Practitioner: Anu Roa  Diagnosis: L distal humerus fx (presumably pathological)  Subjective  Subjective: \"I hurt too much to move but I know I can try\"  General Comment  Comments: Per RN ok for therapy  Pain Assessment  Pain Assessment: 0-10  Pain Level: 9  Pain Location: Arm  Pain Orientation: Left  Non-Pharmaceutical Pain Intervention(s): Therapeutic presence;Relaxation techniques; Ambulation/Increased Activity; Emotional support;Repositioned  Response to Pain Intervention: Patient Satisfied  Vital Signs  Patient Currently in Pain: Yes   Orientation  Orientation  Overall Orientation Status: Within Functional Limits  Objective    ADL  UE Dressing: Moderate assistance(management of gown)  LE Dressing: Maximum assistance(adjust socks)  Toileting: Dependent/Total(purewick)  Additional Comments: L arm sling repositioned for support when pt was seated; pt declined further ADLs      Balance  Sitting Balance: Contact guard assistance  Standing Balance:  Moderate assistance  Standing Balance  Time: ~30 sec  Activity: sit<>stand; pt declined any further ambulation 2/2 pain  Comment: no AD  Functional Mobility  Functional - Mobility Device: Other(HHA gait belt)  Activity: Other  Assist Level: Moderate assistance  Toilet Transfers  Toilet Transfer: Unable to assess  Toilet Transfers Comments: pt declined toilet transfer  Bed mobility  Supine to Sit: Unable to assess  Sit to Supine: Unable to assess  Scooting: Contact guard assistance  Comment: pt seated in chair at beginning and end of session  Transfers  Sit to stand: Moderate assistance  Stand to sit: Moderate assistance     Cognition  Overall Cognitive Status: Exceptions  Arousal/Alertness: Appropriate responses to stimuli  Following Commands:  Follows one step commands with increased time  Attention Span: Attends with cues to redirect  Memory: Decreased recall of recent events  Safety Judgement: Decreased awareness of need for assistance;Decreased awareness of need for safety  Problem Solving: Assistance required to correct errors made;Assistance required to identify errors made  Insights: Decreased awareness of deficits  Initiation: Requires cues for some  Sequencing: Requires cues for some     Type of ROM/Therapeutic Exercise  Type of ROM/Therapeutic Exercise: AROM  Comment: RUE only  Exercises  Shoulder Depression: x5  Shoulder Elevation: x5  Elbow Flexion: x10  Elbow Extension: x10  Supination: x10  Pronation: x10  Wrist Flexion: x10  Wrist Extension: x10  Grasp/Release: x10      Plan   Plan  Times per week: 3-5x  Current Treatment Recommendations: Strengthening, Endurance Training, Patient/Caregiver Education & Training, Self-Care / ADL, ROM, Balance Training, Positioning, Functional Mobility Training, Safety Education & Training    AM-PAC Score     AM-[facility] Inpatient Daily Activity Raw Score: 10  AM-PAC Inpatient ADL T-Scale Score : 27.31  ADL Inpatient CMS 0-100% Score: 74.7  ADL Inpatient CMS G-Code Modifier : CL    Goals  Short term goals  Time Frame for Short term goals: for 1 week or otherwise specified  Short term goal 1: Perform UE dressing with min A by 4/23  Short term goal 2: Perform functional transfer to chair/commode with CGA by 4/19; progressing continue to 4/25/19  Short term goal 3: Perform toileting with mod A  Patient Goals   Patient goals : Pt did not state     Therapy Time   Individual Concurrent Group Co-treatment   Time In 1132         Time Out 1200         Minutes 28         Timed Code Treatment Minutes: [address], OTR/L

## 2019-04-24 NOTE — PROGRESS NOTES
End of shift report given to Judah Desai RN at bedside. Patient alert and oriented. Bed in lowest position with wheels locked. Call light within reach.  No further needs at this time. [facility]

## 2019-04-24 NOTE — PLAN OF CARE
Patient's EF (Ejection Fraction) is greater than 40%    Patient has a past medical history of Anxiety, CAD (coronary artery disease), Cancer ([address] CHF (congestive heart failure) ([address] Depression, Diabetes mellitus ([address] Glaucoma, Hyperlipidemia, Hypertension, Macular degeneration, Thyroid disease, and TIA (transient ischemic attack). Comorbidities reviewed and education provided. Patient and family's stated goal of care: increase activity tolerance, better understand heart failure and disease management and be more comfortable prior to discharge    Patient's current functional capacity:  Slight limitation of physical activity. Comfortable at rest. Ordinary physical activity results in fatigue, palpitation, dyspnea. Pt up in chair at this time on room air. Pt denies shortness of breath. Pt with nonpitting lower extremity edema. Patient's weights and intake/output reviewed:    Patient Vitals for the past 96 hrs (Last 3 readings):   Weight   04/24/19 0630 135 lb 8 oz (61.5 kg)   04/23/19 0613 138 lb 11.2 oz (62.9 kg)   04/22/19 0634 137 lb 14.4 oz (62.6 kg)       Intake/Output Summary (Last 24 hours) at 4/24/2019 0955  Last data filed at 4/24/2019 0954  Gross per 24 hour   Intake 720 ml   Output 1075 ml   Net -355 ml         >>For CHF and Comorbidity documentation on Education Time and Topics, please see Education Tab    Diabetes education provided today:    Diabetic Neuropathy: signs and therapy. Foot care: advised to wash feet daily, pat dry and apply lotion at night, avoiding between toes. Need to look at feet daily and report to a physician any signs of inflammation or skin damage. Discussed diabetes shoes and socks. Diabetic retinopathy: the most frequent cause of blindness in [place] currently. Measures to prevent that. Diabetic nephropathy: Kidney function, microalbumin as a sign of diabetic nephropathy. Stages of kidney disease. Nutrition as a mainstream of diabetes therapy.  [place] about label reading. Carbs: good carbs and bad carbs, importance of carb counting, incorporation of protein with each meal to reduce Glycemic index, importance of portions, Carb/insulin ratio. Fats: Good fats and bad fats, meal planning and supplements. Physical activity: advised to exercise 5-7 days a week 30-60 mins at least. Discussed how it affects BS readings. Managing high and low sugar readings.

## 2019-04-24 NOTE — PLAN OF CARE
Problem: Pain:  Goal: Pain level will decrease  Description  Pain level will decrease  Outcome: Ongoing  Note:   Patient's pain level is uncontrolled in L arm, perocet PRN q4. Will continue to monitor. Problem: Falls - Risk of:  Goal: Will remain free from falls  Description  Will remain free from falls  Outcome: Ongoing  Note:   Pt high fall risk. Instructed to use call light before getting out of bed. Assist x 1-2 to [facility]. Call light within reach. Bed in low position. Bed alarm on. Will continue to monitor. Problem: Risk for Impaired Skin Integrity  Goal: Tissue integrity - skin and mucous membranes  Description  Structural intactness and normal physiological function of skin and  mucous membranes. Outcome: Ongoing  Note:   Patient's skin assessed. See flowsheet. Patient turned in bed. Pressure ulcer prevention in place. No issues with skin integrity this shift. Will continue to monitor.

## 2019-04-24 NOTE — PROGRESS NOTES
Physical Therapy  Facility/Department: [facility] A2 CARD TELEMETRY  Daily Treatment Note  NAME: Papito Rose  : 1928  MRN: [id number]    Date of Service: 2019    Discharge Recommendations:  Subacute/Skilled Nursing Facility   PT Equipment Recommendations  Equipment Needed: No    Patient Diagnosis(es): The primary encounter diagnosis was Other closed nondisplaced fracture of distal end of left humerus, initial encounter. Diagnoses of Fall, initial encounter and Acute on chronic congestive heart failure, unspecified heart failure type [facility]) were also pertinent to this visit. has a past medical history of Anxiety, CAD (coronary artery disease), Cancer [address] CHF (congestive heart failure) ([facility] [address] Depression, Diabetes mellitus [facility] Glaucoma, Hyperlipidemia, Hypertension, Macular degeneration, Thyroid disease, and TIA (transient ischemic attack). has a past surgical history that includes Cholecystectomy; Cardiac surgery; Thyroidectomy; Spine surgery; and Coronary artery bypass graft. Restrictions  Restrictions/Precautions  Restrictions/Precautions: Weight Bearing, Fall Risk  Required Braces or Orthoses?: Yes  Upper Extremity Weight Bearing Restrictions  Right Upper Extremity Weight Bearing: Non Weight Bearing  Left Upper Extremity Weight Bearing: Non Weight Bearing  Required Braces or Orthoses  Left Upper Extremity Brace/Splint: Sling  Left Upper Extremity Brace/Splint: L long arm splint  Position Activity Restriction  Other position/activity restrictions: High fall risk, Up as tolerated     Subjective   General  Chart Reviewed: Yes  Response To Previous Treatment: Patient with no complaints from previous session.   Family / Caregiver Present: No  Referring Practitioner: Dr. Lilliana Ayala, DO  Subjective  Subjective: pt agreeable to therapy, wants to get OOB  General Comment  Comments: Pt resting in bed on approach; RN cleared pt for therapy  Pain Screening  Patient Currently in Pain: Yes(pt c/o pain during goals  Time Frame for Short term goals: 1 week (4/23/19)  Short term goal 1: Pt will be mod A x 1 for bed mobility. - GOAL MET 4/22; updated: Pt will be SBA for bed mobility  Short term goal 2: Pt will be SBA for sit<>stand and bed<>chair transfers. Short term goal 3: Pt will ambulate 50 ft with LRAD and SBA. Short term goal 4: 4/20: Pt will participate in 12-15 reps of BLE exercises to promote strength and activity tolerance. - GOAL MET  4/24  Patient Goals   Patient goals : \"to get better\"    Plan    Plan  Times per week: 3-5x/wk  Times per day: Daily  Specific instructions for Next Treatment: progress mobility as tolerated  Current Treatment Recommendations: Strengthening, Gait Training, ROM, Balance Training, Neuromuscular Re-education, Functional Mobility Training, Endurance Training, Transfer Training, Safety Education & Training, Home Exercise Program  Safety Devices  Type of devices:  All fall risk precautions in place, Call light within reach, Chair alarm in place, Gait belt, Left in chair, Nurse notified     Therapy Time   Individual Concurrent Group Co-treatment   Time In 0935         Time Out 1010         Minutes [address] [facility], [address] [place] [address]

## 2019-04-24 NOTE — PROGRESS NOTES
Blood Glucose for lunch 69, gave 2 OJ, recheck 78 blood glucose. Educated on need to drink juice to increase blood sugar. Educated on symptoms of hypoglycemia. Will continue to monitor.

## 2019-04-24 NOTE — PROGRESS NOTES
Hospitalist Progress Note      PCP: Lyle Denson    Date of Admission: 4/17/2019    Chief Complaint:  LUE pain, dyspnea    Hospital Course: \"80 y.o. female who presented to [facility] with subacute onset of pain in left arm for last 3 weeks, also noted gradually worsening shortness of breath on exertion and increasing swelling on lower extremity. Patient denies any accidental fall or injury, fever, chest pain, cough or congestion, abdominal pain, nausea or vomiting. In ER workup patient was found possible pathological fracture on left lower end of the humerus and CHF. \"       Subjective:  LUE pain persists but is bearable. Cr still high. Not drinking much.       Medications:  Reviewed    Infusion Medications    dextrose       Scheduled Medications    [START ON 4/26/2019] torsemide  20 mg Oral Daily    sodium chloride  500 mL Intravenous Once    atorvastatin  80 mg Oral Nightly    aspirin  325 mg Oral Daily    latanoprost  1 drop Ophthalmic Daily    citalopram  20 mg Oral Daily    calcitRIOL  0.25 mcg Oral Once per day on Tue Fri    dorzolamide  1 drop Both Eyes BID    cetirizine  5 mg Oral Daily    traZODone  150 mg Oral Nightly    insulin lispro  6 Units Subcutaneous BID    insulin glargine  12 Units Subcutaneous Nightly    ferrous sulfate  325 mg Oral BID WC    pantoprazole  40 mg Oral BID    gabapentin  100 mg Oral BID    levothyroxine  100 mcg Oral Daily    mometasone-formoterol  2 puff Inhalation BID    sodium chloride flush  10 mL Intravenous 2 times per day    enoxaparin  30 mg Subcutaneous Daily    insulin lispro  0-6 Units Subcutaneous TID WC    insulin lispro  0-3 Units Subcutaneous Nightly    oxazepam  15 mg Oral TID     PRN Meds: oxyCODONE-acetaminophen, oxyCODONE-acetaminophen, prochlorperazine, nitroGLYCERIN, sodium chloride flush, magnesium hydroxide, glucose, dextrose, glucagon (rDNA), dextrose      Intake/Output Summary (Last 24 hours) at 4/24/2019 [address] [facility] filed at 4/24/2019 0954  Gross per 24 hour   Intake 1080 ml   Output 1075 ml   Net 5 ml       Physical Exam Performed:    BP (!) 135/54   Pulse 76   Temp 98.2 °F (36.8 °C) (Oral)   Resp 18   Ht 5' 1\" (1.549 m)   Wt 135 lb 8 oz (61.5 kg)   SpO2 93%   BMI 25.60 kg/m²     General appearance: No apparent distress, appears stated age and cooperative. HEENT: Pupils equal, round, and reactive to light. Conjunctivae/corneas clear. Very hard of hearing. Neck: Supple, with full range of motion. No jugular venous distention. Trachea midline. Respiratory:  Normal respiratory effort. Clear to auscultation, bilaterally without Rales/Wheezes/Rhonchi. Diminished in bases. Cardiovascular: Regular rate and rhythm with normal S1/S2 without murmurs, rubs or gallops. Abdomen: Soft, non-tender, non-distended with normal bowel sounds. Musculoskeletal: No clubbing, cyanosis or edema bilaterally. LUE in sling. Skin: Skin color, texture, turgor normal.  No rashes or lesions. Neurologic:  Neurovascularly intact without any focal sensory/motor deficits. Cranial nerves: II-XII intact, grossly non-focal.  Psychiatric: Alert and oriented, thought content appropriate, limited insight. Capillary Refill: Brisk,< 3 seconds   Peripheral Pulses: +2 palpable, equal bilaterally       Labs:   Recent Labs     04/23/19  0725   WBC 4.6   HGB 12.2   HCT 36.2        Recent Labs     04/23/19  0725 04/23/19  0919 04/24/19  1054   * 134* 134*   K 4.4 4.1 3.7   CL 91* 90* 92*   CO2 37* 34* 32   BUN 27* 26* 28*   CREATININE 1.5* 1.5* 1.5*   CALCIUM 9.1 8.8 9.1     Recent Labs     04/23/19  0919   AST 18   ALT 12   BILITOT 0.5   ALKPHOS 77     No results for input(s): INR in the last 72 hours. No results for input(s): Dareen Serge in the last 72 hours.     Urinalysis:      Lab Results   Component Value Date    NITRU Negative 01/06/2019    WBCUA 0-2 12/28/2018    BACTERIA 4+ 12/28/2018    RBCUA 0-2 12/28/2018 BLOODU Negative 01/06/2019    SPECGRAV 1.015 01/06/2019    GLUCOSEU Negative 01/06/2019       Radiology:  NM BONE SCAN WHOLE BODY   Preliminary Result   1. No convincing evidence of bony metastatic disease. 2. Intense T6 vertebral body uptake corresponding to a severe compression   deformity on the CT examination. No convincing evidence of pathologic   fracture on the CT exam.   3. Degenerative uptake at each shoulder. CT CHEST WO CONTRAST   Final Result   Multifocal airspace disease bilaterally, age indeterminate. This could   represent chronic atelectasis, however pneumonia should be considered as well      Multiple nodular opacities as described. Given the history, neoplastic   nodule should be considered. Lobular low-density in the right lower cervical region as described. Correlate with ultrasound or follow-up CT with contrast.      Age-indeterminate compression fracture in the thoracic spine. Small hiatal hernia. Wall thickening in the distal esophagus would be   difficult to exclude      Age-indeterminate low-density left adrenal gland enlargement. XR CHEST PORTABLE   Final Result   Mild heart failure with slightly improved pulmonary edema and unchanged left   pleural effusion. No acute osseous abnormality. XR HUMERUS LEFT (MIN 2 VIEWS)   Final Result   Distal humeral fracture, presumably pathologic as it occurs in an area   demonstrating subtle lytic irregularity suggesting underlying bone lesion.                  Assessment/Plan:    Active Hospital Problems    Diagnosis Date Noted    Coronary artery disease involving native heart without angina pectoris [I25.10]      Priority: High    Closed fracture of left humerus [S42.302A] 04/17/2019    Diabetes mellitus ([address] [E11.9] 01/04/2019    Benign essential HTN [I10] 01/04/2019    Acute on chronic diastolic congestive heart failure [facility]) [I50.33] 06/19/2018       \"89 y.o. female who presented to [facility] with subacute onset of pain in left arm for last 3 weeks, also noted gradually worsening shortness of breath on exertion and increasing swelling on lower extremity. Patient denies any accidental fall or injury, fever, chest pain, cough or congestion, abdominal pain, nausea or vomiting. In ER workup patient was found possible pathological fracture on left lower end of the humerus and CHF. \"      LUE fracture, possibly pathologic due to unknown malignancy  - analgesia. - ortho decided the risks of surgery outweigh the benefits. F/u in clinic for repeat Xray  - oncology consulted.    - PT/OT, patient and family want [facility]    Acute hypoxic respiratory failure  - likely due to atelectasis related to above issue. IS. Mobilize patient as tolerated. Acute on chronic diastolic CHF  - CXR on admission had possible mild CHF. She didn't respond much to diuresis, and developed GERALDINE. Patient said she no longer felt dyspneic. Held diuresis on 4/24.  - carvedilol. No ACE/ARB due to renal instability. GERALDINE  - likely due to diuresis and cardiorenal syndrome. Held diuresis as above. 500 ml IVFs on 4/24.  - baseline Cr is probably normal.    CAD  - aspirin, statin, carvedilol    DM2  - adjusted insulin regimen      DVT Prophylaxis: enoxaparin  Diet: DIET CARB CONTROL; No Added Salt (3-4 GM)  Code Status: Full Code    PT/OT Eval Status: rec'd SNF    Dispo - perhaps medically ready for discharge 4/25 if Cr improves. She wants SNF.       Erendira Mathew MD

## 2019-04-24 NOTE — PLAN OF CARE
Patient's EF (Ejection Fraction) is greater than 40%    Patient has a past medical history of Anxiety, CAD (coronary artery disease), Cancer ([address] CHF (congestive heart failure) ([facility] Depression, Diabetes mellitus ([facility] Glaucoma, Hyperlipidemia, Hypertension, Macular degeneration, Thyroid disease, and TIA (transient ischemic attack). Comorbidities reviewed and education provided. Patient and family's stated goal of care: reduce shortness of breath, increase activity tolerance, better understand heart failure and disease management, be more comfortable and reduce lower extremity edema prior to discharge    Patient's current functional capacity:  Marked limitation of physical activity. Comfortable at rest. Less than ordinary activity causes fatigue, palpitation, or dyspnea. Pt resting in bed at this time on room air. Pt denies shortness of breath. Pt with nonpitting lower extremity edema.  Patient's weights and intake/output reviewed:    Patient Vitals for the past 96 hrs (Last 3 readings):   Weight   04/23/19 0613 138 lb 11.2 oz (62.9 kg)   04/22/19 0634 137 lb 14.4 oz (62.6 kg)   04/21/19 0627 138 lb 1.6 oz (62.6 kg)       Intake/Output Summary (Last 24 hours) at 4/24/2019 0005  Last data filed at 4/23/2019 2339  Gross per 24 hour   Intake 1200 ml   Output 1550 ml   Net -350 ml         >>For CHF and Comorbidity documentation on Education Time and Topics, please see Education Tab

## 2019-04-24 NOTE — PROGRESS NOTES
Nutrition Assessment (Low Risk)    Type and Reason for Visit: Initial(LOS assessment)    Nutrition Recommendations:   · Continue carb control, SCOUT diet  · Monitor po intakes, nutrition adequacy, weights, pertinent labs, BMs, education needs    Nutrition Assessment:  Patient assessed for nutritional risk. Deemed to be at low risk at this time. Will continue to monitor for changes in status. Pt adequately nourished AEB pt report of good appetite and stable weight PTA. Po intakes % per EMR. Weight appears to have trended down per EMR, possibly 2/2 fluid. Pt denied having any nutrition-related questions/needs or need for education. Will continue current diet. Malnutrition Assessment:  · Malnutrition Status:  At risk for malnutrition    Nutrition Risk Level   Risk Level: Low    Nutrition Diagnosis:   · Problem: No nutrition diagnosis at this time    Nutrition Intervention:  Food and/or Delivery: Continue current diet  Nutrition Education/Counseling/Coordination of Care:  Continued Inpatient Monitoring      Electronically signed by Larissa Meigs, RD, LD on 4/24/19 at 11:37 AM    Contact Number: Office: [phone]; [address]: [phone]

## 2019-04-25 VITALS
BODY MASS INDEX: 27.51 KG/M2 | HEIGHT: 61 IN | OXYGEN SATURATION: 93 % | RESPIRATION RATE: 17 BRPM | TEMPERATURE: 98.6 F | SYSTOLIC BLOOD PRESSURE: 124 MMHG | HEART RATE: 79 BPM | WEIGHT: 145.72 LBS | DIASTOLIC BLOOD PRESSURE: 56 MMHG

## 2019-04-25 LAB
ANION GAP SERPL CALCULATED.3IONS-SCNC: 12 MMOL/L (ref 3–16)
BUN BLDV-MCNC: 25 MG/DL (ref 7–20)
CALCIUM SERPL-MCNC: 9 MG/DL (ref 8.3–10.6)
CHLORIDE BLD-SCNC: 92 MMOL/L (ref 99–110)
CO2: 29 MMOL/L (ref 21–32)
CREAT SERPL-MCNC: 1.3 MG/DL (ref 0.6–1.2)
GFR AFRICAN AMERICAN: 46
GFR NON-AFRICAN AMERICAN: 38
GLUCOSE BLD-MCNC: 115 MG/DL (ref 70–99)
GLUCOSE BLD-MCNC: 141 MG/DL (ref 70–99)
GLUCOSE BLD-MCNC: 144 MG/DL (ref 70–99)
GLUCOSE BLD-MCNC: 191 MG/DL (ref 70–99)
PERFORMED ON: ABNORMAL
POTASSIUM SERPL-SCNC: 4.3 MMOL/L (ref 3.5–5.1)
SODIUM BLD-SCNC: 133 MMOL/L (ref 136–145)

## 2019-04-25 PROCEDURE — 6370000000 HC RX 637 (ALT 250 FOR IP): Performed by: INTERNAL MEDICINE

## 2019-04-25 PROCEDURE — 97116 GAIT TRAINING THERAPY: CPT

## 2019-04-25 PROCEDURE — 97530 THERAPEUTIC ACTIVITIES: CPT

## 2019-04-25 PROCEDURE — 94640 AIRWAY INHALATION TREATMENT: CPT

## 2019-04-25 PROCEDURE — 2580000003 HC RX 258: Performed by: INTERNAL MEDICINE

## 2019-04-25 PROCEDURE — 6360000002 HC RX W HCPCS: Performed by: INTERNAL MEDICINE

## 2019-04-25 PROCEDURE — 80048 BASIC METABOLIC PNL TOTAL CA: CPT

## 2019-04-25 PROCEDURE — 36415 COLL VENOUS BLD VENIPUNCTURE: CPT

## 2019-04-25 RX ORDER — OXYCODONE AND ACETAMINOPHEN 7.5; 325 MG/1; MG/1
1 TABLET ORAL EVERY 4 HOURS PRN
Qty: 30 TABLET | Refills: 0 | Status: SHIPPED | OUTPATIENT
Start: 2019-04-25 | End: 2019-05-02

## 2019-04-25 RX ORDER — OXYCODONE AND ACETAMINOPHEN 7.5; 325 MG/1; MG/1
1 TABLET ORAL EVERY 4 HOURS PRN
Qty: 30 TABLET | Refills: 0 | Status: SHIPPED | OUTPATIENT
Start: 2019-04-25 | End: 2019-04-25 | Stop reason: SDUPTHER

## 2019-04-25 RX ORDER — DOCUSATE SODIUM 100 MG/1
100 CAPSULE, LIQUID FILLED ORAL 2 TIMES DAILY
Qty: 60 CAPSULE | Refills: 0
Start: 2019-04-25 | End: 2019-05-25

## 2019-04-25 RX ADMIN — INSULIN LISPRO 1 UNITS: 100 INJECTION, SOLUTION INTRAVENOUS; SUBCUTANEOUS at 12:40

## 2019-04-25 RX ADMIN — PANTOPRAZOLE SODIUM 40 MG: 40 TABLET, DELAYED RELEASE ORAL at 08:38

## 2019-04-25 RX ADMIN — CITALOPRAM HYDROBROMIDE 20 MG: 20 TABLET ORAL at 08:38

## 2019-04-25 RX ADMIN — OXYCODONE AND ACETAMINOPHEN 2 TABLET: 5; 325 TABLET ORAL at 11:01

## 2019-04-25 RX ADMIN — OXAZEPAM 15 MG: 15 CAPSULE ORAL at 15:17

## 2019-04-25 RX ADMIN — CETIRIZINE HYDROCHLORIDE 5 MG: 5 TABLET ORAL at 08:38

## 2019-04-25 RX ADMIN — DORZOLAMIDE HYDROCHLORIDE 1 DROP: 20 SOLUTION/ DROPS OPHTHALMIC at 09:12

## 2019-04-25 RX ADMIN — GABAPENTIN 100 MG: 100 CAPSULE ORAL at 08:38

## 2019-04-25 RX ADMIN — ASPIRIN 325 MG: 325 TABLET, COATED ORAL at 08:38

## 2019-04-25 RX ADMIN — FERROUS SULFATE TAB 325 MG (65 MG ELEMENTAL FE) 325 MG: 325 (65 FE) TAB at 08:38

## 2019-04-25 RX ADMIN — Medication 10 ML: at 08:39

## 2019-04-25 RX ADMIN — ENOXAPARIN SODIUM 30 MG: 30 INJECTION SUBCUTANEOUS at 08:38

## 2019-04-25 RX ADMIN — OXYCODONE AND ACETAMINOPHEN 2 TABLET: 5; 325 TABLET ORAL at 02:06

## 2019-04-25 RX ADMIN — Medication 2 PUFF: at 08:28

## 2019-04-25 RX ADMIN — OXYCODONE AND ACETAMINOPHEN 2 TABLET: 5; 325 TABLET ORAL at 16:38

## 2019-04-25 RX ADMIN — LEVOTHYROXINE SODIUM 100 MCG: 100 TABLET ORAL at 06:43

## 2019-04-25 ASSESSMENT — PAIN SCALES - GENERAL
PAINLEVEL_OUTOF10: 9
PAINLEVEL_OUTOF10: 10
PAINLEVEL_OUTOF10: 9
PAINLEVEL_OUTOF10: 8
PAINLEVEL_OUTOF10: 10
PAINLEVEL_OUTOF10: 10

## 2019-04-25 ASSESSMENT — PAIN DESCRIPTION - LOCATION
LOCATION: ARM
LOCATION: ARM

## 2019-04-25 ASSESSMENT — PAIN DESCRIPTION - DESCRIPTORS: DESCRIPTORS: SHARP;SHOOTING;DISCOMFORT

## 2019-04-25 ASSESSMENT — PAIN DESCRIPTION - PROGRESSION
CLINICAL_PROGRESSION: NOT CHANGED

## 2019-04-25 ASSESSMENT — PAIN DESCRIPTION - ONSET: ONSET: ON-GOING

## 2019-04-25 ASSESSMENT — PAIN DESCRIPTION - PAIN TYPE
TYPE: ACUTE PAIN
TYPE: ACUTE PAIN

## 2019-04-25 ASSESSMENT — PAIN DESCRIPTION - ORIENTATION
ORIENTATION: LEFT
ORIENTATION: LEFT

## 2019-04-25 ASSESSMENT — PAIN DESCRIPTION - FREQUENCY: FREQUENCY: CONTINUOUS

## 2019-04-25 NOTE — PROGRESS NOTES
Report given to James Polk. She is to take over care the rest of shift.  Electronically signed by Balwinder Garzon RN on 4/25/2019 at 3:39 PM

## 2019-04-25 NOTE — DISCHARGE SUMMARY
Hospital Medicine Discharge Summary    Patient ID: Servando Muck      Patient's PCP: Martin Garcia    Admit Date: 4/17/2019     Discharge Date:   04/25/19     Admitting Physician: Vincent Nascimento MD     Discharge Physician: Kenya Corbett MD     Discharge Diagnoses: Active Hospital Problems    Diagnosis Date Noted    Coronary artery disease involving native heart without angina pectoris [I25.10]      Priority: High    Closed fracture of left humerus [S42.302A] 04/17/2019    Diabetes mellitus ([facility] [address] [E11.9] 01/04/2019    Benign essential HTN [I10] 01/04/2019    Acute on chronic diastolic congestive heart failure ([facility] [address] [facility] [I50.33] 06/19/2018       The patient was seen and examined on day of discharge and this discharge summary is in conjunction with any daily progress note from day of discharge. Hospital Course:  \"89 y.o. female who presented to [facility] with subacute onset of pain in left arm for last 3 weeks, also noted gradually worsening shortness of breath on exertion and increasing swelling on lower extremity.  Patient denies any accidental fall or injury, fever, chest pain, cough or congestion, abdominal pain, nausea or vomiting.  In ER workup patient was found possible pathological fracture on left lower end of the humerus and CHF. \"        LUE fracture, possibly pathologic due to unknown malignancy  - analgesia. - ortho decided the risks of surgery outweigh the benefits. F/u in clinic for repeat Xray  - oncology consulted. CT and bone scan here ultimately did not suggest malignancy. An EGD can be considered in the future to evaluate the incidental finding of esophageal thickening.    - PT/OT, patient and family want [facility]     Acute hypoxic respiratory failure  - likely due to atelectasis related to above issue. IS. Mobilize patient as tolerated.      Acute on chronic diastolic CHF  - CXR on admission had possible mild CHF.   She didn't respond much to IV diuresis, and developed GERALDINE. Patient said she no longer felt dyspneic. Held diuresis on 4/24. Eventually had to give a little IVFs because the patient wasn't drinking much. Ultimately resumed her usual dose of oral diuretic. - carvedilol. No ACE/ARB due to renal instability.      GERALDINE  - likely due to diuresis and cardiorenal syndrome. Held diuresis as above. 500 ml IVFs on 4/24.  - baseline Cr is probably normal.     CAD  - aspirin, statin, carvedilol     DM2  - adjusted insulin regimen        Physical Exam Performed:     BP (!) 124/56   Pulse 79   Temp 98.6 °F (37 °C) (Oral)   Resp 17   Ht 5' 1\" (1.549 m)   Wt 145 lb 11.6 oz (66.1 kg)   SpO2 93%   BMI 27.53 kg/m²       General appearance: No apparent distress, appears stated age and cooperative. HEENT: Pupils equal, round, and reactive to light. Conjunctivae/corneas clear. Very hard of hearing. Neck: Supple, with full range of motion. No jugular venous distention. Trachea midline. Respiratory:  Normal respiratory effort. Clear to auscultation, bilaterally without Rales/Wheezes/Rhonchi. Diminished in bases. Cardiovascular: Regular rate and rhythm with normal S1/S2 without murmurs, rubs or gallops. Abdomen: Soft, non-tender, non-distended with normal bowel sounds. Musculoskeletal: No clubbing, cyanosis or edema bilaterally. LUE in sling. Skin: Skin color, texture, turgor normal.  No rashes or lesions. Neurologic:  Neurovascularly intact without any focal sensory/motor deficits. Cranial nerves: II-XII intact, grossly non-focal.  Psychiatric: Alert and oriented, thought content appropriate, limited insight. Capillary Refill: Brisk,< 3 seconds   Peripheral Pulses: +2 palpable, equal bilaterally         Labs:  For convenience and continuity at follow-up the following most recent labs are provided:      CBC:    Lab Results   Component Value Date    WBC 4.6 04/23/2019    HGB 12.2 04/23/2019    HCT 36.2 04/23/2019     04/23/2019 Renal:    Lab Results   Component Value Date     04/25/2019    K 4.3 04/25/2019    K 4.4 04/23/2019    CL 92 04/25/2019    CO2 29 04/25/2019    BUN 25 04/25/2019    CREATININE 1.3 04/25/2019    CALCIUM 9.0 04/25/2019    PHOS 3.4 01/09/2019         Significant Diagnostic Studies    Radiology:   NM BONE SCAN WHOLE BODY   Final Result   1. No convincing evidence of bony metastatic disease. 2. Intense T6 vertebral body uptake corresponding to a severe compression   deformity on the CT examination. No convincing evidence of pathologic   fracture on the CT exam.   3. Degenerative uptake at each shoulder. CT CHEST WO CONTRAST   Final Result   Multifocal airspace disease bilaterally, age indeterminate. This could   represent chronic atelectasis, however pneumonia should be considered as well      Multiple nodular opacities as described. Given the history, neoplastic   nodule should be considered. Lobular low-density in the right lower cervical region as described. Correlate with ultrasound or follow-up CT with contrast.      Age-indeterminate compression fracture in the thoracic spine. Small hiatal hernia. Wall thickening in the distal esophagus would be   difficult to exclude      Age-indeterminate low-density left adrenal gland enlargement. XR CHEST PORTABLE   Final Result   Mild heart failure with slightly improved pulmonary edema and unchanged left   pleural effusion. No acute osseous abnormality. XR HUMERUS LEFT (MIN 2 VIEWS)   Final Result   Distal humeral fracture, presumably pathologic as it occurs in an area   demonstrating subtle lytic irregularity suggesting underlying bone lesion.                 Consults:     IP CONSULT TO CASE MANAGEMENT  IP CONSULT TO HOSPITALIST  IP CONSULT TO HEART FAILURE NURSE/COORDINATOR  IP CONSULT TO DIETITIAN  IP CONSULT TO ORTHOPEDIC SURGERY  IP CONSULT TO PALLIATIVE CARE  IP CONSULT TO ONCOLOGY    Disposition:  SNF     Condition at Discharge: Stable    Discharge Instructions/Follow-up:  Follow up with PCP within 1-2 weeks. Check a renal panel in 1 week to make sure she isn't on too strong of a diuretic. Code Status:  Full Code     Activity: activity as tolerated    Diet: diabetic diet      Discharge Medications:     Current Discharge Medication List           Details   oxyCODONE-acetaminophen (PERCOCET) 7.5-325 MG per tablet Take 1 tablet by mouth every 4 hours as needed for Pain for up to 7 days. Intended supply: 30 days  Qty: 30 tablet, Refills: 0    Comments: Reduce doses taken as pain becomes manageable  Associated Diagnoses: Other closed nondisplaced fracture of distal end of left humerus, initial encounter      docusate sodium (COLACE) 100 MG capsule Take 1 capsule by mouth 2 times daily  Qty: 60 capsule, Refills: 0              Details   insulin lispro (HUMALOG) 100 UNIT/ML injection vial Inject 6 Units into the skin 2 times daily      oxazepam (SERAX) 15 MG capsule Take 15 mg by mouth 3 times daily as needed for Sleep or Anxiety. Insulin Detemir (LEVEMIR SC) Inject 12 Units into the skin nightly      torsemide (DEMADEX) 20 MG tablet Take 1 tablet by mouth daily  Qty: 30 tablet, Refills: 3      lidocaine (LIDODERM) 5 % Place 2 patches onto the skin daily 12 hours on, 12 hours off. Qty: 30 patch, Refills: 0      gabapentin (NEURONTIN) 100 MG capsule Take 1 capsule by mouth 2 times daily for 30 days. Eddie Eyal: 60 capsule, Refills: 0      mometasone-formoterol (DULERA) 100-5 MCG/ACT inhaler Inhale 2 puffs into the lungs 2 times daily as needed      dorzolamide (TRUSOPT) 2 % ophthalmic solution Place 1 drop into both eyes 2 times daily      calcitRIOL (ROCALTROL) 0.25 MCG capsule Take 0.25 mcg by mouth See Admin Instructions Takes only on tuesday and friday      traZODone (DESYREL) 50 MG tablet Take 150 mg by mouth nightly       esomeprazole Magnesium (NEXIUM) 40 MG PACK Take 20 mg by mouth 2 times daily       citalopram

## 2019-04-25 NOTE — PROGRESS NOTES
Physical Therapy  Facility/Department: [facility] A2 CARD TELEMETRY  Daily Treatment Note  NAME: Brandi Laws  : 1928  MRN: [id number]    Date of Service: 2019    Discharge Recommendations:  Subacute/Skilled Nursing Facility   PT Equipment Recommendations  Equipment Needed: No    Patient Diagnosis(es): The primary encounter diagnosis was Other closed nondisplaced fracture of distal end of left humerus, initial encounter. Diagnoses of Fall, initial encounter and Acute on chronic congestive heart failure, unspecified heart failure type [facility]) were also pertinent to this visit. has a past medical history of Anxiety, CAD (coronary artery disease), Cancer ([facility] [address] CHF (congestive heart failure) ([facility] [address] Depression, Diabetes mellitus [facility] Glaucoma, Hyperlipidemia, Hypertension, Macular degeneration, Thyroid disease, and TIA (transient ischemic attack). has a past surgical history that includes Cholecystectomy; Cardiac surgery; Thyroidectomy; Spine surgery; and Coronary artery bypass graft. Restrictions  Restrictions/Precautions  Restrictions/Precautions: Weight Bearing, Fall Risk  Required Braces or Orthoses?: Yes  Upper Extremity Weight Bearing Restrictions  Right Upper Extremity Weight Bearing: Weight Bearing As Tolerated  Left Upper Extremity Weight Bearing: Non Weight Bearing  Required Braces or Orthoses  Left Upper Extremity Brace/Splint: Sling  Left Upper Extremity Brace/Splint: L long arm splint  Position Activity Restriction  Other position/activity restrictions: High fall risk, Up as tolerated  Subjective   General  Chart Reviewed: Yes  Response To Previous Treatment: Patient with no complaints from previous session.   Family / Caregiver Present: Yes  Referring Practitioner: Dr. Fran Marsh, DO  Subjective  Subjective: pt agreeable to therapy, wants to go to the bathroom  General Comment  Comments: Pt resting in bed on approach; RN cleared pt for therapy  Pain Screening  Patient Currently in Pain: Yes  Pain Assessment  Pain Assessment: 0-10  Pain Level: 10  Pain Type: Acute pain  Pain Location: Arm  Pain Orientation: Left  Non-Pharmaceutical Pain Intervention(s): Ambulation/Increased Activity;Repositioned; Therapeutic presence  Vital Signs  Patient Currently in Pain: Yes       Orientation  Orientation  Overall Orientation Status: Within Functional Limits       Objective   Bed mobility  Supine to Sit: Unable to assess(pt up in chair upon antry and at EOS)  Sit to Supine: Unable to assess  Scooting: Stand by assistance(to scoot back in chair)  Transfers  Sit to Stand: (from chair and from toilet)  Stand to sit: Contact guard assistance  Ambulation  Ambulation?: Yes  Ambulation 1  Surface: level tile  Device: Hand-Held Assist  Assistance: Minimal assistance; Moderate assistance  Quality of Gait: Moderately unsteady with one episode of LOB during minimal gait distance, min to mod A required  Distance: 15 ft x to to and from bathroom  Comments: pt stood at toilet X 3 -5 min for hygiene with SBA to CGA     Balance  Posture: Fair  Sitting - Static: Good  Sitting - Dynamic: Good;-  Standing - Static: Fair  Standing - Dynamic: Fair;-  Exercises  Gluteal Sets: X 10 B with c/o pain (not rated)  Hip Flexion: X 10 BLE  Knee Long Arc Quad: X 10 BLE  Ankle Pumps: X 10 BLE                         Assessment   Body structures, Functions, Activity limitations: Decreased functional mobility ; Decreased endurance;Decreased strength;Decreased balance;Decreased sensation;Decreased safe awareness; Increased Pain  Assessment: Pt amb with min to mod A of 1 around bed HHA, continued to work on strengthening ex. Cont with skilled therapy to promote indep with mobility. Recommend  SNF at D/C.   Treatment Diagnosis: decreased (I) with mobility  Specific instructions for Next Treatment: progress mobility as tolerated  Prognosis: Good  Patient Education: Role of PT, safety with mobility, POC, d/c recs, sling, positioning, importance of mobility; pt verbalized understanding  REQUIRES PT FOLLOW UP: Yes  Activity Tolerance  Activity Tolerance: Patient limited by pain       AM-PAC Score     AM-PAC Inpatient Mobility without Stair Climbing Raw Score : 13  AM-PAC Inpatient without Stair Climbing T-Scale Score : 38.96  Mobility Inpatient CMS 0-100% Score: 58.44  Mobility Inpatient without Stair CMS G-Code Modifier : CK       Goals  Short term goals  Time Frame for Short term goals: 1 week (4/23/19)  Short term goal 1: Pt will be mod A x 1 for bed mobility. - GOAL MET 4/22; updated: Pt will be SBA for bed mobility  Short term goal 2: Pt will be SBA for sit<>stand and bed<>chair transfers. Short term goal 3: Pt will ambulate 50 ft with LRAD and SBA. Short term goal 4: 4/20: Pt will participate in 12-15 reps of BLE exercises to promote strength and activity tolerance. - GOAL MET 4/24  Patient Goals   Patient goals : \"to get better\"    Plan    Plan  Times per week: 3-5x/wk  Times per day: Daily  Specific instructions for Next Treatment: progress mobility as tolerated  Current Treatment Recommendations: Strengthening, Gait Training, ROM, Balance Training, Neuromuscular Re-education, Functional Mobility Training, Endurance Training, Transfer Training, Safety Education & Training, Home Exercise Program  Safety Devices  Type of devices: All fall risk precautions in place, Call light within reach, Chair alarm in place, Gait belt, Left in chair, Nurse notified     Therapy Time   Individual Concurrent Group Co-treatment   Time In 99 [id number]         Time Out 0820         Minutes 31              If pt is discharged prior to next therapy session, this note will serve as discharge summary.     Emmy Lau, PT

## 2019-04-25 NOTE — PROGRESS NOTES
Patient discharged Maxene Phlegm with [employer] transporting. Discharge instructions explained and given to patient along with prescriptions. IV removed. No complications. Telebox removed and returned. Patient belongings gone over and accounted for. Patient to taken to ambulance via stretcher by Sumaya Valera.

## 2019-04-26 LAB — CA 27-29: 59 U/ML (ref 0–38)

## 2019-04-26 NOTE — CARE COORDINATION
[facility] 4/26 0900  Writer aware pt has DC'ed to [facility]. Writer made CRE with [facility] aware of pt's dispo so CRE can f/u with pt during stay at [facility], should [facility] be needed. Pt was active with [facility] PTA.      Luci Burrows RN CTN with [facility] [address]  NBK:[phone]

## 2019-06-23 ENCOUNTER — APPOINTMENT (OUTPATIENT)
Dept: GENERAL RADIOLOGY | Age: 84
DRG: 291 | End: 2019-06-23
Payer: MEDICARE

## 2019-06-23 ENCOUNTER — HOSPITAL ENCOUNTER (INPATIENT)
Age: 84
LOS: 2 days | Discharge: NURSING, REHAB OR LONG-TERM CARE | DRG: 291 | End: 2019-06-26
Attending: EMERGENCY MEDICINE | Admitting: INTERNAL MEDICINE
Payer: MEDICARE

## 2019-06-23 DIAGNOSIS — F41.1 GAD (GENERALIZED ANXIETY DISORDER): ICD-10-CM

## 2019-06-23 DIAGNOSIS — J18.9 HCAP (HEALTHCARE-ASSOCIATED PNEUMONIA): ICD-10-CM

## 2019-06-23 DIAGNOSIS — I50.9 ACUTE ON CHRONIC CONGESTIVE HEART FAILURE, UNSPECIFIED HEART FAILURE TYPE (HCC): Primary | ICD-10-CM

## 2019-06-23 DIAGNOSIS — S42.495A OTHER CLOSED NONDISPLACED FRACTURE OF DISTAL END OF LEFT HUMERUS, INITIAL ENCOUNTER: ICD-10-CM

## 2019-06-23 DIAGNOSIS — R09.02 HYPOXIA: ICD-10-CM

## 2019-06-23 LAB
A/G RATIO: 0.9 (ref 1.1–2.2)
ALBUMIN SERPL-MCNC: 2.9 G/DL (ref 3.4–5)
ALP BLD-CCNC: 92 U/L (ref 40–129)
ALT SERPL-CCNC: 10 U/L (ref 10–40)
ANION GAP SERPL CALCULATED.3IONS-SCNC: 11 MMOL/L (ref 3–16)
AST SERPL-CCNC: 13 U/L (ref 15–37)
BASOPHILS ABSOLUTE: 0.1 K/UL (ref 0–0.2)
BASOPHILS RELATIVE PERCENT: 0.7 %
BILIRUB SERPL-MCNC: 0.5 MG/DL (ref 0–1)
BUN BLDV-MCNC: 22 MG/DL (ref 7–20)
CALCIUM SERPL-MCNC: 8.9 MG/DL (ref 8.3–10.6)
CHLORIDE BLD-SCNC: 93 MMOL/L (ref 99–110)
CO2: 28 MMOL/L (ref 21–32)
CREAT SERPL-MCNC: 1.2 MG/DL (ref 0.6–1.2)
EOSINOPHILS ABSOLUTE: 0 K/UL (ref 0–0.6)
EOSINOPHILS RELATIVE PERCENT: 0 %
GFR AFRICAN AMERICAN: 51
GFR NON-AFRICAN AMERICAN: 42
GLOBULIN: 3.3 G/DL
GLUCOSE BLD-MCNC: 309 MG/DL (ref 70–99)
HCT VFR BLD CALC: 34.3 % (ref 36–48)
HEMOGLOBIN: 11.4 G/DL (ref 12–16)
LYMPHOCYTES ABSOLUTE: 0.7 K/UL (ref 1–5.1)
LYMPHOCYTES RELATIVE PERCENT: 4.5 %
MCH RBC QN AUTO: 31.7 PG (ref 26–34)
MCHC RBC AUTO-ENTMCNC: 33.2 G/DL (ref 31–36)
MCV RBC AUTO: 95.4 FL (ref 80–100)
MONOCYTES ABSOLUTE: 0.6 K/UL (ref 0–1.3)
MONOCYTES RELATIVE PERCENT: 4.2 %
NEUTROPHILS ABSOLUTE: 13.2 K/UL (ref 1.7–7.7)
NEUTROPHILS RELATIVE PERCENT: 90.6 %
PDW BLD-RTO: 14.4 % (ref 12.4–15.4)
PLATELET # BLD: 343 K/UL (ref 135–450)
PMV BLD AUTO: 7.1 FL (ref 5–10.5)
POTASSIUM SERPL-SCNC: 4.2 MMOL/L (ref 3.5–5.1)
RBC # BLD: 3.6 M/UL (ref 4–5.2)
SODIUM BLD-SCNC: 132 MMOL/L (ref 136–145)
TOTAL PROTEIN: 6.2 G/DL (ref 6.4–8.2)
WBC # BLD: 14.6 K/UL (ref 4–11)

## 2019-06-23 PROCEDURE — 83880 ASSAY OF NATRIURETIC PEPTIDE: CPT

## 2019-06-23 PROCEDURE — 84145 PROCALCITONIN (PCT): CPT

## 2019-06-23 PROCEDURE — 71046 X-RAY EXAM CHEST 2 VIEWS: CPT

## 2019-06-23 PROCEDURE — 80053 COMPREHEN METABOLIC PANEL: CPT

## 2019-06-23 PROCEDURE — 99285 EMERGENCY DEPT VISIT HI MDM: CPT

## 2019-06-23 PROCEDURE — 85025 COMPLETE CBC W/AUTO DIFF WBC: CPT

## 2019-06-23 RX ORDER — FLUTICASONE FUROATE AND VILANTEROL 100; 25 UG/1; UG/1
POWDER RESPIRATORY (INHALATION) DAILY
COMMUNITY

## 2019-06-23 RX ORDER — OMEPRAZOLE 20 MG/1
20 CAPSULE, DELAYED RELEASE ORAL DAILY
COMMUNITY

## 2019-06-23 RX ORDER — ASPIRIN 81 MG
TABLET, DELAYED RELEASE (ENTERIC COATED) ORAL 2 TIMES DAILY
COMMUNITY

## 2019-06-23 RX ORDER — NICOTINE POLACRILEX 4 MG
15 LOZENGE BUCCAL SEE ADMIN INSTRUCTIONS
COMMUNITY

## 2019-06-23 RX ORDER — OXYCODONE AND ACETAMINOPHEN 7.5; 325 MG/1; MG/1
1 TABLET ORAL EVERY 4 HOURS PRN
Status: ON HOLD | COMMUNITY
End: 2019-06-26 | Stop reason: SDUPTHER

## 2019-06-23 RX ORDER — TIMOLOL MALEATE/LATANOPROST/PF 0.5-0.005%
DROPS OPHTHALMIC (EYE) NIGHTLY
COMMUNITY

## 2019-06-23 RX ORDER — ASPIRIN 81 MG/1
81 TABLET, CHEWABLE ORAL DAILY
COMMUNITY

## 2019-06-23 RX ORDER — GUAIFENESIN 400 MG/1
600 TABLET ORAL 2 TIMES DAILY PRN
COMMUNITY

## 2019-06-24 PROBLEM — I50.43 CHF (CONGESTIVE HEART FAILURE), NYHA CLASS III, ACUTE ON CHRONIC, COMBINED (HCC): Status: ACTIVE | Noted: 2019-06-24

## 2019-06-24 LAB
ANION GAP SERPL CALCULATED.3IONS-SCNC: 12 MMOL/L (ref 3–16)
BASE EXCESS ARTERIAL: 5.5 MMOL/L (ref -3–3)
BUN BLDV-MCNC: 20 MG/DL (ref 7–20)
CALCIUM SERPL-MCNC: 8.8 MG/DL (ref 8.3–10.6)
CARBOXYHEMOGLOBIN ARTERIAL: 1.2 % (ref 0–1.5)
CHLORIDE BLD-SCNC: 94 MMOL/L (ref 99–110)
CO2: 28 MMOL/L (ref 21–32)
CREAT SERPL-MCNC: 1.1 MG/DL (ref 0.6–1.2)
EKG ATRIAL RATE: 81 BPM
EKG DIAGNOSIS: NORMAL
EKG P AXIS: 20 DEGREES
EKG P-R INTERVAL: 164 MS
EKG Q-T INTERVAL: 432 MS
EKG QRS DURATION: 96 MS
EKG QTC CALCULATION (BAZETT): 501 MS
EKG R AXIS: 56 DEGREES
EKG T AXIS: 21 DEGREES
EKG VENTRICULAR RATE: 81 BPM
GFR AFRICAN AMERICAN: 56
GFR NON-AFRICAN AMERICAN: 47
GLUCOSE BLD-MCNC: 133 MG/DL (ref 70–99)
GLUCOSE BLD-MCNC: 137 MG/DL (ref 70–99)
GLUCOSE BLD-MCNC: 168 MG/DL (ref 70–99)
GLUCOSE BLD-MCNC: 176 MG/DL (ref 70–99)
GLUCOSE BLD-MCNC: 184 MG/DL (ref 70–99)
GLUCOSE BLD-MCNC: 251 MG/DL (ref 70–99)
GLUCOSE BLD-MCNC: 69 MG/DL (ref 70–99)
HCO3 ARTERIAL: 30.6 MMOL/L (ref 21–29)
HEMOGLOBIN, ART, EXTENDED: 11.1 G/DL (ref 12–16)
LACTIC ACID: 0.9 MMOL/L (ref 0.4–2)
MAGNESIUM: 1.7 MG/DL (ref 1.8–2.4)
METHEMOGLOBIN ARTERIAL: 0 %
O2 CONTENT ARTERIAL: 15 ML/DL
O2 SAT, ARTERIAL: 94.7 %
O2 THERAPY: ABNORMAL
PCO2 ARTERIAL: 47 MMHG (ref 35–45)
PERFORMED ON: ABNORMAL
PH ARTERIAL: 7.43 (ref 7.35–7.45)
PO2 ARTERIAL: 71.6 MMHG (ref 75–108)
POTASSIUM SERPL-SCNC: 3.7 MMOL/L (ref 3.5–5.1)
PRO-BNP: 3365 PG/ML (ref 0–449)
PROCALCITONIN: 0.16 NG/ML (ref 0–0.15)
SODIUM BLD-SCNC: 134 MMOL/L (ref 136–145)
TCO2 ARTERIAL: 32 MMOL/L
TROPONIN: 0.05 NG/ML
TROPONIN: 0.05 NG/ML
TROPONIN: 0.06 NG/ML

## 2019-06-24 PROCEDURE — 96372 THER/PROPH/DIAG INJ SC/IM: CPT

## 2019-06-24 PROCEDURE — 80048 BASIC METABOLIC PNL TOTAL CA: CPT

## 2019-06-24 PROCEDURE — 93005 ELECTROCARDIOGRAM TRACING: CPT | Performed by: PHYSICIAN ASSISTANT

## 2019-06-24 PROCEDURE — 2700000000 HC OXYGEN THERAPY PER DAY

## 2019-06-24 PROCEDURE — 6370000000 HC RX 637 (ALT 250 FOR IP): Performed by: INTERNAL MEDICINE

## 2019-06-24 PROCEDURE — 87040 BLOOD CULTURE FOR BACTERIA: CPT

## 2019-06-24 PROCEDURE — 6370000000 HC RX 637 (ALT 250 FOR IP): Performed by: PHYSICIAN ASSISTANT

## 2019-06-24 PROCEDURE — 83735 ASSAY OF MAGNESIUM: CPT

## 2019-06-24 PROCEDURE — 83605 ASSAY OF LACTIC ACID: CPT

## 2019-06-24 PROCEDURE — 2580000003 HC RX 258: Performed by: EMERGENCY MEDICINE

## 2019-06-24 PROCEDURE — 96374 THER/PROPH/DIAG INJ IV PUSH: CPT

## 2019-06-24 PROCEDURE — 2580000003 HC RX 258: Performed by: INTERNAL MEDICINE

## 2019-06-24 PROCEDURE — 6360000002 HC RX W HCPCS: Performed by: INTERNAL MEDICINE

## 2019-06-24 PROCEDURE — 2060000000 HC ICU INTERMEDIATE R&B

## 2019-06-24 PROCEDURE — 36415 COLL VENOUS BLD VENIPUNCTURE: CPT

## 2019-06-24 PROCEDURE — 6360000002 HC RX W HCPCS: Performed by: PHYSICIAN ASSISTANT

## 2019-06-24 PROCEDURE — 6370000000 HC RX 637 (ALT 250 FOR IP): Performed by: EMERGENCY MEDICINE

## 2019-06-24 PROCEDURE — 84484 ASSAY OF TROPONIN QUANT: CPT

## 2019-06-24 PROCEDURE — 6360000002 HC RX W HCPCS: Performed by: EMERGENCY MEDICINE

## 2019-06-24 PROCEDURE — 93010 ELECTROCARDIOGRAM REPORT: CPT | Performed by: INTERNAL MEDICINE

## 2019-06-24 PROCEDURE — 94761 N-INVAS EAR/PLS OXIMETRY MLT: CPT

## 2019-06-24 PROCEDURE — 82803 BLOOD GASES ANY COMBINATION: CPT

## 2019-06-24 RX ORDER — LATANOPROST 50 UG/ML
1 SOLUTION/ DROPS OPHTHALMIC NIGHTLY
Status: DISCONTINUED | OUTPATIENT
Start: 2019-06-24 | End: 2019-06-26 | Stop reason: HOSPADM

## 2019-06-24 RX ORDER — DEXTROSE MONOHYDRATE 25 G/50ML
12.5 INJECTION, SOLUTION INTRAVENOUS PRN
Status: DISCONTINUED | OUTPATIENT
Start: 2019-06-24 | End: 2019-06-26 | Stop reason: HOSPADM

## 2019-06-24 RX ORDER — LEVOTHYROXINE SODIUM 0.1 MG/1
100 TABLET ORAL DAILY
Status: DISCONTINUED | OUTPATIENT
Start: 2019-06-24 | End: 2019-06-26 | Stop reason: HOSPADM

## 2019-06-24 RX ORDER — POTASSIUM CHLORIDE 20 MEQ/1
40 TABLET, EXTENDED RELEASE ORAL PRN
Status: DISCONTINUED | OUTPATIENT
Start: 2019-06-24 | End: 2019-06-26 | Stop reason: HOSPADM

## 2019-06-24 RX ORDER — TORSEMIDE 20 MG/1
40 TABLET ORAL DAILY
Status: DISCONTINUED | OUTPATIENT
Start: 2019-06-24 | End: 2019-06-24

## 2019-06-24 RX ORDER — OXYCODONE AND ACETAMINOPHEN 7.5; 325 MG/1; MG/1
1 TABLET ORAL EVERY 4 HOURS PRN
Status: DISCONTINUED | OUTPATIENT
Start: 2019-06-24 | End: 2019-06-26 | Stop reason: HOSPADM

## 2019-06-24 RX ORDER — OXAZEPAM 15 MG/1
15 CAPSULE ORAL 3 TIMES DAILY PRN
Status: DISCONTINUED | OUTPATIENT
Start: 2019-06-24 | End: 2019-06-26 | Stop reason: HOSPADM

## 2019-06-24 RX ORDER — PANTOPRAZOLE SODIUM 40 MG/1
40 TABLET, DELAYED RELEASE ORAL
Status: DISCONTINUED | OUTPATIENT
Start: 2019-06-24 | End: 2019-06-26 | Stop reason: HOSPADM

## 2019-06-24 RX ORDER — FUROSEMIDE 10 MG/ML
40 INJECTION INTRAMUSCULAR; INTRAVENOUS ONCE
Status: COMPLETED | OUTPATIENT
Start: 2019-06-24 | End: 2019-06-24

## 2019-06-24 RX ORDER — NICOTINE POLACRILEX 4 MG
15 LOZENGE BUCCAL PRN
Status: DISCONTINUED | OUTPATIENT
Start: 2019-06-24 | End: 2019-06-26 | Stop reason: HOSPADM

## 2019-06-24 RX ORDER — GABAPENTIN 100 MG/1
100 CAPSULE ORAL 2 TIMES DAILY
Status: DISCONTINUED | OUTPATIENT
Start: 2019-06-24 | End: 2019-06-26 | Stop reason: HOSPADM

## 2019-06-24 RX ORDER — POTASSIUM CHLORIDE 7.45 MG/ML
10 INJECTION INTRAVENOUS PRN
Status: DISCONTINUED | OUTPATIENT
Start: 2019-06-24 | End: 2019-06-26 | Stop reason: HOSPADM

## 2019-06-24 RX ORDER — DEXTROSE MONOHYDRATE 50 MG/ML
100 INJECTION, SOLUTION INTRAVENOUS PRN
Status: DISCONTINUED | OUTPATIENT
Start: 2019-06-24 | End: 2019-06-26 | Stop reason: HOSPADM

## 2019-06-24 RX ORDER — INSULIN GLARGINE 100 [IU]/ML
6 INJECTION, SOLUTION SUBCUTANEOUS NIGHTLY
Status: DISCONTINUED | OUTPATIENT
Start: 2019-06-24 | End: 2019-06-26 | Stop reason: HOSPADM

## 2019-06-24 RX ORDER — CITALOPRAM 20 MG/1
20 TABLET ORAL DAILY
Status: DISCONTINUED | OUTPATIENT
Start: 2019-06-25 | End: 2019-06-26 | Stop reason: HOSPADM

## 2019-06-24 RX ORDER — GABAPENTIN 100 MG/1
100 CAPSULE ORAL 2 TIMES DAILY
COMMUNITY

## 2019-06-24 RX ORDER — TORSEMIDE 20 MG/1
40 TABLET ORAL DAILY
Status: ON HOLD | COMMUNITY
End: 2019-06-26 | Stop reason: HOSPADM

## 2019-06-24 RX ORDER — MAGNESIUM SULFATE 1 G/100ML
1 INJECTION INTRAVENOUS ONCE
Status: COMPLETED | OUTPATIENT
Start: 2019-06-24 | End: 2019-06-24

## 2019-06-24 RX ORDER — ONDANSETRON 2 MG/ML
4 INJECTION INTRAMUSCULAR; INTRAVENOUS EVERY 6 HOURS PRN
Status: DISCONTINUED | OUTPATIENT
Start: 2019-06-24 | End: 2019-06-26 | Stop reason: HOSPADM

## 2019-06-24 RX ORDER — ATORVASTATIN CALCIUM 40 MG/1
80 TABLET, FILM COATED ORAL NIGHTLY
Status: DISCONTINUED | OUTPATIENT
Start: 2019-06-24 | End: 2019-06-26 | Stop reason: HOSPADM

## 2019-06-24 RX ORDER — SODIUM CHLORIDE 0.9 % (FLUSH) 0.9 %
10 SYRINGE (ML) INJECTION PRN
Status: DISCONTINUED | OUTPATIENT
Start: 2019-06-24 | End: 2019-06-26 | Stop reason: HOSPADM

## 2019-06-24 RX ORDER — FUROSEMIDE 10 MG/ML
40 INJECTION INTRAMUSCULAR; INTRAVENOUS 2 TIMES DAILY
Status: DISCONTINUED | OUTPATIENT
Start: 2019-06-24 | End: 2019-06-26 | Stop reason: HOSPADM

## 2019-06-24 RX ORDER — OXYCODONE HYDROCHLORIDE AND ACETAMINOPHEN 5; 325 MG/1; MG/1
1 TABLET ORAL ONCE
Status: COMPLETED | OUTPATIENT
Start: 2019-06-24 | End: 2019-06-24

## 2019-06-24 RX ORDER — SODIUM CHLORIDE 0.9 % (FLUSH) 0.9 %
10 SYRINGE (ML) INJECTION EVERY 12 HOURS SCHEDULED
Status: DISCONTINUED | OUTPATIENT
Start: 2019-06-24 | End: 2019-06-26 | Stop reason: HOSPADM

## 2019-06-24 RX ORDER — ASPIRIN 81 MG/1
81 TABLET, CHEWABLE ORAL DAILY
Status: DISCONTINUED | OUTPATIENT
Start: 2019-06-24 | End: 2019-06-26 | Stop reason: HOSPADM

## 2019-06-24 RX ORDER — LORAZEPAM 1 MG/1
1 TABLET ORAL EVERY 8 HOURS
Status: DISCONTINUED | OUTPATIENT
Start: 2019-06-24 | End: 2019-06-24

## 2019-06-24 RX ORDER — 0.9 % SODIUM CHLORIDE 0.9 %
500 INTRAVENOUS SOLUTION INTRAVENOUS ONCE
Status: DISCONTINUED | OUTPATIENT
Start: 2019-06-24 | End: 2019-06-24

## 2019-06-24 RX ADMIN — INSULIN HUMAN 5 UNITS: 100 INJECTION, SOLUTION PARENTERAL at 01:04

## 2019-06-24 RX ADMIN — OXYCODONE HYDROCHLORIDE AND ACETAMINOPHEN 1 TABLET: 7.5; 325 TABLET ORAL at 16:59

## 2019-06-24 RX ADMIN — GABAPENTIN 100 MG: 100 CAPSULE ORAL at 11:03

## 2019-06-24 RX ADMIN — Medication 10 ML: at 09:24

## 2019-06-24 RX ADMIN — FUROSEMIDE 40 MG: 10 INJECTION, SOLUTION INTRAMUSCULAR; INTRAVENOUS at 22:33

## 2019-06-24 RX ADMIN — ASPIRIN 81 MG 81 MG: 81 TABLET ORAL at 09:24

## 2019-06-24 RX ADMIN — LATANOPROST 1 DROP: 50 SOLUTION OPHTHALMIC at 22:49

## 2019-06-24 RX ADMIN — INSULIN LISPRO 1 UNITS: 100 INJECTION, SOLUTION INTRAVENOUS; SUBCUTANEOUS at 17:23

## 2019-06-24 RX ADMIN — OXAZEPAM 15 MG: 15 CAPSULE ORAL at 15:37

## 2019-06-24 RX ADMIN — Medication 10 ML: at 22:41

## 2019-06-24 RX ADMIN — OXYCODONE HYDROCHLORIDE AND ACETAMINOPHEN 1 TABLET: 7.5; 325 TABLET ORAL at 06:07

## 2019-06-24 RX ADMIN — FUROSEMIDE 40 MG: 10 INJECTION, SOLUTION INTRAMUSCULAR; INTRAVENOUS at 09:24

## 2019-06-24 RX ADMIN — Medication 10 ML: at 13:58

## 2019-06-24 RX ADMIN — ONDANSETRON 4 MG: 2 INJECTION INTRAMUSCULAR; INTRAVENOUS at 13:58

## 2019-06-24 RX ADMIN — PANTOPRAZOLE SODIUM 40 MG: 40 TABLET, DELAYED RELEASE ORAL at 15:37

## 2019-06-24 RX ADMIN — MAGNESIUM SULFATE HEPTAHYDRATE 1 G: 1 INJECTION, SOLUTION INTRAVENOUS at 11:03

## 2019-06-24 RX ADMIN — GABAPENTIN 100 MG: 100 CAPSULE ORAL at 22:33

## 2019-06-24 RX ADMIN — VANCOMYCIN HYDROCHLORIDE 1000 MG: 1 INJECTION, POWDER, LYOPHILIZED, FOR SOLUTION INTRAVENOUS at 03:34

## 2019-06-24 RX ADMIN — OXYCODONE HYDROCHLORIDE AND ACETAMINOPHEN 1 TABLET: 5; 325 TABLET ORAL at 01:04

## 2019-06-24 RX ADMIN — INSULIN GLARGINE 6 UNITS: 100 INJECTION, SOLUTION SUBCUTANEOUS at 22:33

## 2019-06-24 RX ADMIN — OXYCODONE HYDROCHLORIDE AND ACETAMINOPHEN 1 TABLET: 7.5; 325 TABLET ORAL at 11:03

## 2019-06-24 RX ADMIN — FUROSEMIDE 40 MG: 10 INJECTION, SOLUTION INTRAMUSCULAR; INTRAVENOUS at 01:28

## 2019-06-24 RX ADMIN — MEROPENEM 1 G: 1 INJECTION, POWDER, FOR SOLUTION INTRAVENOUS at 03:34

## 2019-06-24 RX ADMIN — INSULIN LISPRO 1 UNITS: 100 INJECTION, SOLUTION INTRAVENOUS; SUBCUTANEOUS at 22:34

## 2019-06-24 RX ADMIN — OXAZEPAM 15 MG: 15 CAPSULE ORAL at 23:57

## 2019-06-24 RX ADMIN — OXYCODONE HYDROCHLORIDE AND ACETAMINOPHEN 1 TABLET: 7.5; 325 TABLET ORAL at 22:33

## 2019-06-24 RX ADMIN — LEVOTHYROXINE SODIUM 100 MCG: 0.1 TABLET ORAL at 09:24

## 2019-06-24 RX ADMIN — ENOXAPARIN SODIUM 30 MG: 100 INJECTION SUBCUTANEOUS at 09:24

## 2019-06-24 RX ADMIN — ATORVASTATIN CALCIUM 80 MG: 40 TABLET, FILM COATED ORAL at 22:32

## 2019-06-24 ASSESSMENT — PAIN SCALES - GENERAL
PAINLEVEL_OUTOF10: 8
PAINLEVEL_OUTOF10: 7
PAINLEVEL_OUTOF10: 3
PAINLEVEL_OUTOF10: 4
PAINLEVEL_OUTOF10: 6
PAINLEVEL_OUTOF10: 4
PAINLEVEL_OUTOF10: 5
PAINLEVEL_OUTOF10: 10
PAINLEVEL_OUTOF10: 2

## 2019-06-24 NOTE — FLOWSHEET NOTE
06/24/19 0915   Vital Signs   Temp 98 °F (36.7 °C)   Temp Source Oral   Pulse 83   Heart Rate Source Monitor   Resp 16   BP (!) 170/72   BP Location Right upper arm   BP Upper/Lower Upper   Level of Consciousness 0   MEWS Score 1   Patient Currently in Pain Denies   Oxygen Therapy   SpO2 96 %   O2 Flow Rate (L/min) 3 L/min  (decreased to 2 L)   Assessment completed and documented BP elevated states she takes atenolol call placed to [facility]. SR with frequent PAC's, currently on 3L but decreased to 2 L. Cough present.

## 2019-06-24 NOTE — ED NOTES
Bed: 04  Expected date:   Expected time:   Means of arrival:   Comments:  Gian Simpson  06/23/19 7117

## 2019-06-24 NOTE — ED NOTES
Dr. Leland Woodward returning call for Rajat Ferrera @ [address] [place] [address]  06/24/19 1262

## 2019-06-24 NOTE — PROGRESS NOTES
Patient c/o heartburn along with nausea, zofran given. Message sent to Dr. Masood Hadley regarding reordering home medication ompeprazole, serax and daughter states patient is a DNR.

## 2019-06-24 NOTE — CARE COORDINATION
Case Management Assessment  Initial Evaluation    Date/Time of Evaluation: 6/24/2019 4:34 PM  Assessment Completed by: Anyi Davis    Patient Name: Lexi Mccarty  YOB: 1928  Diagnosis: CHF (congestive heart failure), NYHA class III, acute on chronic, combined ([facility] [address] [I50.43]  Date / Time: 6/23/2019 10:44 PM  Admission status/Date: 6/24/19 Inpt 01:45  Chart Reviewed: Yes      Patient Interviewed: Yes   Family Interviewed:  No      Hospitalization in the last 30 days:  No    Contacts  :  Jessica Lakhani   Relationship to Patient:  child  Phone Number:  [phone]  Alternate Contact:     Relationship to Patient:     Phone Number:    Met with: pt    Current  [address] Medicare  Precert required for SNF : Y, N        3 night stay required - Y, N    ADLS  Support Systems: Home Care Staff, Children  Transportation: EMS transportation    Meal Preparation: per facility    Housing  Home Environment: [facility] LTC  Steps:   Plans to Return to Present Housing: Yes  Other Identified Issues:       Has Home O2 in place on admit:  Per facility  Informed of need to bring portable home O2 tank on day of discharge for nursing to connect prior to leaving:   Not Indicated  Verbalized agreement/Understanding:   Not Indicated      DISCHARGE PLAN:  Reviewed chart and met with pt and family. Pt is LTC at [facility] and paln is to rturn. Spoke with Jaleesa at [address] [place] [address] who states pt is not offical bed hold but is LTC and can return at [place]. Per Esperanza November pt has pending medicaid. Will follow for any additional needs. Explained Case Management role/services.

## 2019-06-24 NOTE — PROGRESS NOTES
Patient's EF (Ejection Fraction) is greater than 40%    Patient's weights and intake/output reviewed:    Patient's Last Weight: 134 lbs obtained by standing scale. Today's weight is noted to be 9 lbs less than last documented weight. Intake/Output Summary (Last 24 hours) at 6/24/2019 1418  Last data filed at 6/24/2019 1319  Gross per 24 hour   Intake 240 ml   Output 200 ml   Net 40 ml       Patient's current functional capacity:  Slight limitation of physical activity. Comfortable at rest. Ordinary physical activity results in fatigue, palpitation, dyspnea. Pt up in chair at this time on 2 L O2. Pt denies shortness of breath. Pt with nonpitting lower extremity edema. Patient and family's stated goal of care: reduce shortness of breath, increase activity tolerance, better understand heart failure and disease management, be more comfortable and reduce lower extremity edema prior to discharge        Patient has a past medical history of Anxiety, CAD (coronary artery disease), Cancer ([address] Cerebral artery occlusion with cerebral infarction ([address] CHF (congestive heart failure) ([address] Depression, Diabetes mellitus ([address] Glaucoma, Hypertension, Macular degeneration, Thyroid disease, and TIA (transient ischemic attack). Comorbidities reviewed and education provided.     >>For CHF and Comorbidity documentation on Education Time and Topics, please see Education Tab

## 2019-06-24 NOTE — PROGRESS NOTES
Handoff report and transfer of care given at bedside to [address] [place] [address]. Patient in stable condition, denies needs/concerns at this time. Call light within reach.

## 2019-06-24 NOTE — PROGRESS NOTES
Pt requesting pain medication for pain in back and left arm rating 10/10. PRN  pain medication given, see MAR. SR up x2, Call light and bedside table in easy reach. Denies any other needs at this time.

## 2019-06-24 NOTE — ED PROVIDER NOTES
Emergency Department Provider Note     [facility]: [facility] ED: 6/23/2019    I independently performed a history and physical on [address]. All diagnostic, treatment, and disposition decisions were made by myself in conjunction with the mid-level provider. Briefly, this is a 80 y.o. female here for shortness of breath    ED Triage Vitals [06/23/19 2253]   BP Temp Temp Source Pulse Resp SpO2 Height Weight   (!) 140/109 99.5 °F (37.5 °C) Oral 83 16 91 % 5' 1\" (1.549 m) 145 lb (65.8 kg)      Elderly female, on O2 nasal cannula, rales throughout, no tachypnea, able to speak in full sentences, no wheezes, occasionally irregular rhythm, normal rate in 70s  Patient seen and examined. EKG  The Ekg interpreted by me in the absence of a cardiologist shows. Sinus rhythm with occasional PACs, rate 81, RI interval 164, QTc 501, borderline prolonged, normal axis, no ST segment or T wave changes indicative of acute ischemia    Xr Chest Standard (2 Vw)    Result Date: 6/23/2019  EXAMINATION: TWO XRAY VIEWS OF THE CHEST 6/23/2019 11:28 pm COMPARISON: 04/17/2019 HISTORY: ORDERING SYSTEM PROVIDED HISTORY: shortness of breath TECHNOLOGIST PROVIDED HISTORY: Reason for exam:->shortness of breath Ordering Physician Provided Reason for Exam: shortness of breath Acuity: Acute Type of Exam: Initial FINDINGS: Pulmonary edema is identified with bibasilar airspace disease. Left pleural effusion is again noted. The heart size is at the upper limits of normal. Sternal wires and mediastinal clips are again seen. There is no discernible pneumothorax. Findings suggest congestive heart failure.        Results for orders placed or performed during the hospital encounter of 06/23/19   CBC auto differential   Result Value Ref Range    WBC 14.6 (H) 4.0 - 11.0 K/uL    RBC 3.60 (L) 4.00 - 5.20 M/uL    Hemoglobin 11.4 (L) 12.0 - 16.0 g/dL    Hematocrit 34.3 (L) 36.0 - 48.0 %    MCV 95.4 80.0 - 100.0 fL    MCH 31.7 26.0 - 34.0 pg MCHC 33.2 31.0 - 36.0 g/dL    RDW 14.4 12.4 - 15.4 %    Platelets 809 214 - 148 K/uL    MPV 7.1 5.0 - 10.5 fL    Neutrophils % 90.6 %    Lymphocytes % 4.5 %    Monocytes % 4.2 %    Eosinophils % 0.0 %    Basophils % 0.7 %    Neutrophils # 13.2 (H) 1.7 - 7.7 K/uL    Lymphocytes # 0.7 (L) 1.0 - 5.1 K/uL    Monocytes # 0.6 0.0 - 1.3 K/uL    Eosinophils # 0.0 0.0 - 0.6 K/uL    Basophils # 0.1 0.0 - 0.2 K/uL   Comprehensive metabolic panel   Result Value Ref Range    Sodium 132 (L) 136 - 145 mmol/L    Potassium 4.2 3.5 - 5.1 mmol/L    Chloride 93 (L) 99 - 110 mmol/L    CO2 28 21 - 32 mmol/L    Anion Gap 11 3 - 16    Glucose 309 (H) 70 - 99 mg/dL    BUN 22 (H) 7 - 20 mg/dL    CREATININE 1.2 0.6 - 1.2 mg/dL    GFR Non-African American 42 (A) >60    GFR  51 (A) >60    Calcium 8.9 8.3 - 10.6 mg/dL    Total Protein 6.2 (L) 6.4 - 8.2 g/dL    Alb 2.9 (L) 3.4 - 5.0 g/dL    Albumin/Globulin Ratio 0.9 (L) 1.1 - 2.2    Total Bilirubin 0.5 0.0 - 1.0 mg/dL    Alkaline Phosphatase 92 40 - 129 U/L    ALT 10 10 - 40 U/L    AST 13 (L) 15 - 37 U/L    Globulin 3.3 g/dL   Lactic Acid, Plasma   Result Value Ref Range    Lactic Acid 0.9 0.4 - 2.0 mmol/L   Blood Gas, Arterial   Result Value Ref Range    pH, Arterial 7.431 7.350 - 7.450    pCO2, Arterial 47.0 (H) 35.0 - 45.0 mmHg    pO2, Arterial 71.6 (L) 75.0 - 108.0 mmHg    HCO3, Arterial 30.6 (H) 21.0 - 29.0 mmol/L    Base Excess, Arterial 5.5 (H) -3.0 - 3.0 mmol/L    Hemoglobin, Art, Extended 11.1 (L) 12.0 - 16.0 g/dL    O2 Sat, Arterial 94.7 >92 %    Carboxyhgb, Arterial 1.2 0.0 - 1.5 %    Methemoglobin, Arterial 0.0 <1.5 %    TCO2, Arterial 32.0 Not Established mmol/L    O2 Content, Arterial 15 Not Established mL/dL    O2 Therapy Unknown    Brain Natriuretic Peptide   Result Value Ref Range    Pro-BNP 3,365 (H) 0 - 449 pg/mL   Procalcitonin   Result Value Ref Range    Procalcitonin 0.16 (H) 0.00 - 0.15 ng/mL         For further details of Solitario Pettitt's emergency DO  06/24/19 1925

## 2019-06-24 NOTE — ED PROVIDER NOTES
[address] ED  EMERGENCY DEPARTMENT ENCOUNTER        Pt Name: Favian Hart  MRN: [id number]  [place] 2/25/1928  Date of evaluation: 6/23/2019  Provider: JORGE Duggan  PCP: Daniel Lewis    This patient was seen and evaluated by the attending physician Ruddy Dupont, [address]       Chief Complaint   Patient presents with    Shortness of Breath     patient was brought in by EMS from [facility]. EMS states the nurses were concerned about patient O2 level that they were in the high 80s. EMS states that patient was on O2 when they arrived at 2L and O2 satuaration was in the 90s. EMS states patient does not normally wear O2       HISTORY OF PRESENT ILLNESS   (Location/Symptom, Timing/Onset, Context/Setting, Quality, Duration, Modifying Factors, Severity)  Note limiting factors. Favian Hart is a 80 y.o. female for  [facility]. Onset was 2 weeks ago. Duration has been for two weeks. Context includes patient has significant past medical history of heart failure. She has been staying at [facility] for the last 2 months ever since she was discovered to have a left humerus fracture. About 2 weeks ago she developed a cough while staying at the [place], its productive but she notes that it is clear and colorless. Over the past several days she has been more fatigued which is not her normal self, she has been sleeping all day. She developed shortness of breath about 3 days ago and was placed on 2 L of oxygen to bring her O2 sat up to acceptable levels. She is not normally on oxygen. Today, she had to be brought up an additional liter, she is now on 3 L of oxygen as her SPO2 was down in the high 80s. Her family is with her today and they report that she is a bit more altered than her normal.  Patient denies any chest pain, she does endorse shortness of breath and cough.   She feels that her lower extremities are bit more swollen than normal.  She denies any abdominal pain nausea vomiting back pain. She denies headaches. . Alleviating factors include nothing. Aggravating factors include attempted ambulation. Pain is 0/10. Nothing has been used for pain today. Nursing Notes were all reviewed and agreed with or any disagreements were addressed  in the HPI. REVIEW OF SYSTEMS    (2-9 systems for level 4, 10 or more for level 5)     Review of Systems    Positives and Pertinent negatives as per HPI. Except as noted abovein the ROS, all other systems were reviewed and negative.        PAST MEDICAL HISTORY     Past Medical History:   Diagnosis Date    Anxiety     CAD (coronary artery disease)     Cancer ([facility]     breast    CHF (congestive heart failure) (HCC)     Depression     Diabetes mellitus ([facility]     Glaucoma     Hyperlipidemia     Hypertension     Macular degeneration     Thyroid disease     TIA (transient ischemic attack)          SURGICAL HISTORY     Past Surgical History:   Procedure Laterality Date    CARDIAC SURGERY      CHOLECYSTECTOMY      CORONARY ARTERY BYPASS GRAFT      2 way     SPINE SURGERY      THYROIDECTOMY           CURRENTMEDICATIONS       Previous Medications    ASPIRIN 81 MG CHEWABLE TABLET    Take 81 mg by mouth daily    ATORVASTATIN (LIPITOR) 80 MG TABLET    Take 80 mg by mouth nightly     BIMATOPROST (LUMIGAN) 0.01 % SOLN OPHTHALMIC DROPS    Place 1 drop into both eyes nightly    CALCITRIOL (ROCALTROL) 0.25 MCG CAPSULE    Take 0.25 mcg by mouth See Admin Instructions Takes only on tuesday and friday    CITALOPRAM (CELEXA) 20 MG TABLET    Take 20 mg by mouth daily    DESLORATADINE (CLARINEX) 5 MG TABLET    Take 5 mg by mouth daily    DOCUSATE SODIUM 100 MG TABS    Take by mouth    DORZOLAMIDE (TRUSOPT) 2 % OPHTHALMIC SOLUTION    Place 1 drop into both eyes 2 times daily    ESOMEPRAZOLE MAGNESIUM (NEXIUM) 40 MG PACK    Take 20 mg by mouth 2 times daily     FERROUS SULFATE 325 (65 FE) MG TABLET    Take 325 mg by mouth 2 times daily (with meals)     FLUTICASONE-VILANTEROL (BREO ELLIPTA) 100-25 MCG/INH AEPB INHALER    Inhale into the lungs daily    GABAPENTIN (NEURONTIN) 100 MG CAPSULE    Take 1 capsule by mouth 2 times daily for 30 days. Sarahkala [place] GLUCOSE (GLUTOSE) 40 % GEL    Take 15 g by mouth See Admin Instructions    GUAIFENESIN 400 MG TABLET    Take 600 mg by mouth 4 times daily as needed for Cough    INSULIN DETEMIR (LEVEMIR SC)    Inject 12 Units into the skin nightly    INSULIN LISPRO (HUMALOG) 100 UNIT/ML INJECTION VIAL    Inject 6 Units into the skin 2 times daily    LATANOPROST-TIMOLOL MALEATE 0.005-0.5 % SOLN    Apply to eye    LEVOTHYROXINE (SYNTHROID) 100 MCG TABLET    Take 100 mcg by mouth daily    LIDOCAINE (LIDODERM) 5 %    Place 2 patches onto the skin daily 12 hours on, 12 hours off. LORATADINE ALLERGY RELIEF PO    Take 10 mg by mouth    MOMETASONE-FORMOTEROL (DULERA) 100-5 MCG/ACT INHALER    Inhale 2 puffs into the lungs 2 times daily as needed    NITROGLYCERIN (NITROSTAT) 0.4 MG SL TABLET    Place 0.4 mg under the tongue every 5 minutes as needed for Chest pain    OMEPRAZOLE (PRILOSEC) 20 MG DELAYED RELEASE CAPSULE    Take 20 mg by mouth daily    OXAZEPAM (SERAX) 15 MG CAPSULE    Take 15 mg by mouth 3 times daily as needed for Sleep or Anxiety. OXYCODONE-ACETAMINOPHEN (PERCOCET) 7.5-325 MG PER TABLET    Take 1 tablet by mouth every 4 hours as needed for Pain. POTASSIUM CHLORIDE ER PO    Take 20 mEq by mouth    TORSEMIDE (DEMADEX) 20 MG TABLET    Take 1 tablet by mouth daily    TRAZODONE (DESYREL) 50 MG TABLET    Take 150 mg by mouth nightly          ALLERGIES     Aciphex [rabeprazole]; Altace [ramipril]; Codeine; Cozaar [losartan]; Duloxetine; Iodides; Iodine; Sulfa antibiotics; and Ativan [lorazepam]    FAMILYHISTORY     History reviewed. No pertinent family history. SOCIAL HISTORY       Social History     Socioeconomic History    Marital status:       Spouse name: None    Number of children: all lung fields, worse on inspiration. Speaking comfortably in full sentences. Cough not appreciated on exam.  ABDOMEN: Soft. Non-distended. Non-tender. No masses. No organomegaly. No guarding or rebound. EXTREMITIES: Pitting edema to bilateral dorsum feet. All extremities neurovascularly intact. SKIN: Warm and dry. No acute rashes. NEUROLOGICAL: Alert and oriented to person and place and time however struggles to answer some questions, most likely secondary to hearing. CN's 2-12 intact. No gross facial drooping. PSYCHIATRIC: Normal mood and affect.     DIAGNOSTIC RESULTS   LABS:    Labs Reviewed   CBC WITH AUTO DIFFERENTIAL - Abnormal; Notable for the following components:       Result Value    WBC 14.6 (*)     RBC 3.60 (*)     Hemoglobin 11.4 (*)     Hematocrit 34.3 (*)     Neutrophils # 13.2 (*)     Lymphocytes # 0.7 (*)     All other components within normal limits    Narrative:     Performed at:  [facility],  [place] [facility]   Phone [phone]   COMPREHENSIVE METABOLIC PANEL - Abnormal; Notable for the following components:    Sodium 132 (*)     Chloride 93 (*)     Glucose 309 (*)     BUN 22 (*)     GFR Non- 42 (*)     GFR  51 (*)     Total Protein 6.2 (*)     Alb 2.9 (*)     Albumin/Globulin Ratio 0.9 (*)     AST 13 (*)     All other components within normal limits    Narrative:     Performed at:  [place] [address],  [place] [facility]   Phone [phone]   BLOOD GAS, ARTERIAL - Abnormal; Notable for the following components:    pCO2, Arterial 47.0 (*)     pO2, Arterial 71.6 (*)     HCO3, Arterial 30.6 (*)     Base Excess, Arterial 5.5 (*)     Hemoglobin, Art, Extended 11.1 (*)     All other components within normal limits    Narrative:     Performed at:  [facility],  [place] [facility]   Phone [phone] BRAIN NATRIURETIC PEPTIDE - Abnormal; Notable for the following components:    Pro-BNP 3,365 (*)     All other components within normal limits    Narrative:     Performed at:  [facility],  [place], [facility]   Phone [phone]   PROCALCITONIN - Abnormal; Notable for the following components:    Procalcitonin 0.16 (*)     All other components within normal limits    Narrative:     Performed at:  [facility],  [place], [facility]   Phone [phone]   CULTURE BLOOD #1   CULTURE BLOOD #2   RESPIRATORY CULTURE   LACTIC ACID, PLASMA    Narrative:     Performed at:  [facility],  [place], [facility]   Phone [phone]       All other labs were within normal range or not returned as of this dictation. EKG: All EKG's are interpreted by the Emergency Department Physician who either signs orCo-signs this chart in the absence of a cardiologist.  Please see their note for interpretation of EKG. RADIOLOGY:   Non-plain film images such as CT, Ultrasound and MRI are read by the radiologist. Plain radiographic images are visualized andpreliminarily interpreted by the  ED Provider with the below findings:        Interpretation perthe Radiologist below, if available at the time of this note:    XR CHEST STANDARD (2 VW)   Final Result   Findings suggest congestive heart failure. Xr Chest Standard (2 Vw)    Result Date: 6/23/2019  EXAMINATION: TWO XRAY VIEWS OF THE CHEST 6/23/2019 11:28 pm COMPARISON: 04/17/2019 HISTORY: ORDERING SYSTEM PROVIDED HISTORY: shortness of breath TECHNOLOGIST PROVIDED HISTORY: Reason for exam:->shortness of breath Ordering Physician Provided Reason for Exam: shortness of breath Acuity: Acute Type of Exam: Initial FINDINGS: Pulmonary edema is identified with bibasilar airspace disease. Left pleural effusion is again noted. information including ED workup, results, treatment, diagnosis has been reviewed and discussed with ED attending physician and directly discussed with Hospitalist who is the admitting physician. Pt will be admitted in stable condition. Pt advised of admission and is in full agreement. FINAL IMPRESSION      1.  Acute on chronic congestive heart failure, unspecified heart failure type [facility])          DISPOSITION/PLAN   DISPOSITION Admitted 06/24/2019 01:39:18 AM      PATIENT REFERREDTO:  Gibson Forde            DISCHARGE MEDICATIONS:  New Prescriptions    No medications on file       DISCONTINUED MEDICATIONS:  Discontinued Medications    ASPIRIN 325 MG TABLET    Take 325 mg by mouth daily              (Please note that portions ofthis note were completed with a voice recognition program.  Efforts were made to edit the dictations but occasionally words are mis-transcribed.)    JORGE Duggan (electronically signed)       Rajat Duggan  06/24/19 0225

## 2019-06-24 NOTE — ED NOTES
Perfect serve sent to Dr. BRADY MEDICAL hospitalist @ [address] [place] [address]  06/24/19 7271

## 2019-06-24 NOTE — PROGRESS NOTES
Patient upset states we lost her blanket. Spoke with daughter Aashish Terry she states there is not a blue and white blanket here but at the [facility].

## 2019-06-24 NOTE — PROGRESS NOTES
4 Eyes Skin Assessment     The patient is being assess for   Admission    I agree that 2 RN's have performed a thorough Head to Toe Skin Assessment on the patient. ALL assessment sites listed below have been assessed. Areas assessed by both nurses:   [x]   Head, Face, and Ears   [x]   Shoulders, Back, and Chest, Abdomen  [x]   Arms, Elbows, and Hands   [x]   Coccyx, Sacrum, and Ischium  [x]   Legs, Feet, and Heels        Scattered bruising, scattered  Moles, Left arm fracture    **SHARE this note so that the co-signing nurse is able to place an eSignature**    Co-signer eSignature: Electronically signed by Mukesh Ruiz RN on 6/24/19 at 6:55 AM    Does the Patient have Skin Breakdown?   No          Guerrero Prevention initiated:  Yes   Wound Care Orders initiated:  No      [facility] nurse consulted for Pressure Injury (Stage 3,4, Unstageable, DTI, NWPT, Complex wounds)and New or Established Ostomies:  No      Primary Nurse eSignature: Electronically signed by Shawn Weiss RN on 6/24/19 at 6:30 AM

## 2019-06-24 NOTE — CARE COORDINATION
[address] Transitions Interview     2019    Patient: Kellie Anaya Patient : 1928   MRN: [id number]  Reason for Admission: CHF, recent LUE pathologic fx  RARS: Readmission Risk Score: 21         Spoke with: Radha Thurman (patient)      Readmission Risk  Patient Active Problem List   Diagnosis    Acute on chronic diastolic congestive heart failure ([address]    Pleural effusion    Coronary artery disease involving native heart without angina pectoris    Non-rheumatic mitral regurgitation    Bilateral carotid artery stenosis    Mixed hyperlipidemia    Acute on chronic diastolic CHF (congestive heart failure) ([address]    Diabetes mellitus ([facility]    Benign essential HTN    Closed fracture of left humerus    CHF (congestive heart failure), NYHA class III, acute on chronic, combined [facility])       Inpatient Assessment  Care Transitions Summary    Care Transitions Inpatient Review  Medication Review  Are you able to afford your medications?:  Yes  How often do you have difficulty taking your medications?:  I always take them as prescribed. Housing Review  Who do you live with?:  Alone  Are you an active caregiver in your home?:  No  Social Support  Do you have a ?:  No  Do you have a [facility]?:  No  Durable Medical Equipment  Patient DME:  Wheelchair, [address] chair, Hospital bed, Walker, Other, Straight cane  Other Patient DME:  ramped entrance, rollator, walk in tub  Functional Review  Ability to maintain home (clean home, laundry):  Dependent  Ability to drive and/or has transportation:  Dependent  Ability to do shopping:  Dependent  Hearing and Vision  Visual Impairment:  Visual impairment (Glasses/contacts)  Hearing Impairment:  Hard of hearing  Care Transitions Interventions       Briefly met with patient. She was up in chair reading a magazine and reports she feels badly with nausea. Writer will notified Jadyn ROBISON of patient's c/o nausea.  Writer will follow up after medication to complete interview. Follow Up  No future appointments. Health Maintenance  There are no preventive care reminders to display for this patient.     Naomi Dueñas RN

## 2019-06-24 NOTE — H&P
Historical Provider, MD   LORATADINE ALLERGY RELIEF PO Take 10 mg by mouth   Yes Historical Provider, MD   omeprazole (PRILOSEC) 20 MG delayed release capsule Take 20 mg by mouth daily   Yes Historical Provider, MD   oxyCODONE-acetaminophen (PERCOCET) 7.5-325 MG per tablet Take 1 tablet by mouth every 4 hours as needed for Pain. Yes Historical Provider, MD   POTASSIUM CHLORIDE ER PO Take 20 mEq by mouth   Yes Historical Provider, MD   insulin lispro (HUMALOG) 100 UNIT/ML injection vial Inject 6 Units into the skin 2 times daily   Yes Historical Provider, MD   oxazepam (SERAX) 15 MG capsule Take 15 mg by mouth 3 times daily as needed for Sleep or Anxiety. Yes Historical Provider, MD   torsemide (DEMADEX) 20 MG tablet Take 1 tablet by mouth daily  Patient taking differently: Take 40 mg by mouth daily  1/9/19  Yes Andre Perez MD   lidocaine (LIDODERM) 5 % Place 2 patches onto the skin daily 12 hours on, 12 hours off. 12/6/18  Yes Evelina Arce DO   gabapentin (NEURONTIN) 100 MG capsule Take 1 capsule by mouth 2 times daily for 30 days. .  Patient taking differently: Take 100 mg by mouth 2 times daily. Unsure of dose but takes 3 times a day.  6/25/18 6/23/19 Yes Kyree Murrieta MD   dorzolamide (TRUSOPT) 2 % ophthalmic solution Place 1 drop into both eyes 2 times daily   Yes Historical Provider, MD   calcitRIOL (ROCALTROL) 0.25 MCG capsule Take 0.25 mcg by mouth See Admin Instructions Takes only on tuesday and friday   Yes Historical Provider, MD   traZODone (DESYREL) 50 MG tablet Take 150 mg by mouth nightly    Yes Historical Provider, MD   esomeprazole Magnesium (NEXIUM) 40 MG PACK Take 20 mg by mouth 2 times daily    Yes Historical Provider, MD   citalopram (CELEXA) 20 MG tablet Take 20 mg by mouth daily   Yes Historical Provider, MD   atorvastatin (LIPITOR) 80 MG tablet Take 80 mg by mouth nightly    Yes Historical Provider, MD   levothyroxine (SYNTHROID) 100 MCG tablet Take 100 mcg by mouth daily   Yes fields, worse on inspiration. Speaking comfortably in full sentences. Cough not appreciated on exam.  Heart: Regular rate and rhythm with Normal S1/S2 without murmurs, rubs or gallops, point of maximum impulse non-displaced  Abdomen: Soft, non-tender or non-distended without rigidity or guarding and positive bowel sounds all four quadrants. Extremities: No clubbing, cyanosis, or edema bilaterally. Full range of motion without deformity and normal gait intact. Skin: Skin color, texture, turgor normal.  No rashes or lesions. Neurologic: Alert and oriented X 3, neurovascularly intact with sensory/motor intact upper extremities/lower extremities, bilaterally. Cranial nerves: II-XII intact, grossly non-focal.  Mental status: Alert, oriented, thought content appropriate. Capillary Refill: Acceptable  < 3 seconds  Peripheral Pulses: +3 Easily felt, not easily obliterated with pressure      CXR:  I have reviewed the CXR EKG:  I have reviewed the EKG   CBC   Recent Labs     06/23/19  2300   WBC 14.6*   HGB 11.4*   HCT 34.3*         RENAL  Recent Labs     06/23/19  2300 06/24/19  0339   * 134*   K 4.2 3.7   CL 93* 94*   CO2 28 28   BUN 22* 20   CREATININE 1.2 1.1     LFT'S  Recent Labs     06/23/19  2300   AST 13*   ALT 10   BILITOT 0.5   ALKPHOS 92     COAG  No results for input(s): INR in the last 72 hours.   CARDIAC ENZYMES  Recent Labs     06/24/19  0339   TROPONINI 0.05*       U/A:    Lab Results   Component Value Date    COLORU Yellow 01/06/2019    WBCUA 0-2 12/28/2018    RBCUA 0-2 12/28/2018    MUCUS 2+ 06/15/2017    BACTERIA 4+ 12/28/2018    CLARITYU Clear 01/06/2019    SPECGRAV 1.015 01/06/2019    LEUKOCYTESUR Negative 01/06/2019    BLOODU Negative 01/06/2019    GLUCOSEU Negative 01/06/2019    AMORPHOUS 2+ 12/28/2018       ABG    Lab Results   Component Value Date    MDC4LHO 30.6 06/24/2019    BEART 5.5 06/24/2019    E3QTFTVS 94.7 06/24/2019    PHART 7.431 06/24/2019    LAA8OFN 47.0 06/24/2019 PO2ART 71.6 06/24/2019    UEI0XTK 32.0 06/24/2019           Active Hospital Problems    Diagnosis Date Noted    CHF (congestive heart failure), NYHA class III, acute on chronic, combined [facility] [I50.43] 06/24/2019         PHYSICIANS CERTIFICATION:    I certify that Brant Queen is expected to be hospitalized for more than 2 midnights based on the following assessment and plan:      ASSESSMENT/PLAN:  htn  hld  Cad    Tele  Serial trop  Diuresis  Oxygen   Monitor and correct electrolytes  Resume home meds      DVT Prophylaxis: lovenox   Diet: DIET CARB CONTROL;  Code Status: Full Code      Dispo - inpt. Samy Noonan MD    Thank you Kari Villalobos for the opportunity to be involved in this patient's care. If you have any questions or concerns please feel free to contact me at [phone].

## 2019-06-25 PROBLEM — I50.31 ACUTE DIASTOLIC CONGESTIVE HEART FAILURE (HCC): Status: ACTIVE | Noted: 2019-01-04

## 2019-06-25 LAB
ANION GAP SERPL CALCULATED.3IONS-SCNC: 10 MMOL/L (ref 3–16)
BASOPHILS ABSOLUTE: 0 K/UL (ref 0–0.2)
BASOPHILS RELATIVE PERCENT: 0.3 %
BUN BLDV-MCNC: 21 MG/DL (ref 7–20)
CALCIUM SERPL-MCNC: 8.8 MG/DL (ref 8.3–10.6)
CHLORIDE BLD-SCNC: 92 MMOL/L (ref 99–110)
CO2: 30 MMOL/L (ref 21–32)
CREAT SERPL-MCNC: 1.2 MG/DL (ref 0.6–1.2)
EKG ATRIAL RATE: 83 BPM
EKG DIAGNOSIS: NORMAL
EKG P AXIS: 16 DEGREES
EKG P-R INTERVAL: 172 MS
EKG Q-T INTERVAL: 414 MS
EKG QRS DURATION: 96 MS
EKG QTC CALCULATION (BAZETT): 486 MS
EKG R AXIS: 39 DEGREES
EKG T AXIS: 23 DEGREES
EKG VENTRICULAR RATE: 83 BPM
EOSINOPHILS ABSOLUTE: 0 K/UL (ref 0–0.6)
EOSINOPHILS RELATIVE PERCENT: 0.4 %
GFR AFRICAN AMERICAN: 51
GFR NON-AFRICAN AMERICAN: 42
GLUCOSE BLD-MCNC: 108 MG/DL (ref 70–99)
GLUCOSE BLD-MCNC: 142 MG/DL (ref 70–99)
GLUCOSE BLD-MCNC: 182 MG/DL (ref 70–99)
GLUCOSE BLD-MCNC: 192 MG/DL (ref 70–99)
GLUCOSE BLD-MCNC: 268 MG/DL (ref 70–99)
HCT VFR BLD CALC: 33.3 % (ref 36–48)
HEMOGLOBIN: 11.2 G/DL (ref 12–16)
LYMPHOCYTES ABSOLUTE: 0.9 K/UL (ref 1–5.1)
LYMPHOCYTES RELATIVE PERCENT: 8.9 %
MAGNESIUM: 2 MG/DL (ref 1.8–2.4)
MCH RBC QN AUTO: 31.3 PG (ref 26–34)
MCHC RBC AUTO-ENTMCNC: 33.5 G/DL (ref 31–36)
MCV RBC AUTO: 93.4 FL (ref 80–100)
MONOCYTES ABSOLUTE: 0.5 K/UL (ref 0–1.3)
MONOCYTES RELATIVE PERCENT: 5 %
NEUTROPHILS ABSOLUTE: 8.6 K/UL (ref 1.7–7.7)
NEUTROPHILS RELATIVE PERCENT: 85.4 %
PDW BLD-RTO: 14.2 % (ref 12.4–15.4)
PERFORMED ON: ABNORMAL
PLATELET # BLD: 331 K/UL (ref 135–450)
PMV BLD AUTO: 6.9 FL (ref 5–10.5)
POTASSIUM SERPL-SCNC: 4.1 MMOL/L (ref 3.5–5.1)
RBC # BLD: 3.57 M/UL (ref 4–5.2)
SODIUM BLD-SCNC: 132 MMOL/L (ref 136–145)
TSH REFLEX FT4: 2.91 UIU/ML (ref 0.27–4.2)
WBC # BLD: 10.1 K/UL (ref 4–11)

## 2019-06-25 PROCEDURE — 6370000000 HC RX 637 (ALT 250 FOR IP): Performed by: INTERNAL MEDICINE

## 2019-06-25 PROCEDURE — 36415 COLL VENOUS BLD VENIPUNCTURE: CPT

## 2019-06-25 PROCEDURE — 99232 SBSQ HOSP IP/OBS MODERATE 35: CPT | Performed by: INTERNAL MEDICINE

## 2019-06-25 PROCEDURE — 97535 SELF CARE MNGMENT TRAINING: CPT

## 2019-06-25 PROCEDURE — 97116 GAIT TRAINING THERAPY: CPT

## 2019-06-25 PROCEDURE — 99223 1ST HOSP IP/OBS HIGH 75: CPT | Performed by: INTERNAL MEDICINE

## 2019-06-25 PROCEDURE — 85025 COMPLETE CBC W/AUTO DIFF WBC: CPT

## 2019-06-25 PROCEDURE — 6370000000 HC RX 637 (ALT 250 FOR IP): Performed by: PHYSICIAN ASSISTANT

## 2019-06-25 PROCEDURE — 83735 ASSAY OF MAGNESIUM: CPT

## 2019-06-25 PROCEDURE — 1200000000 HC SEMI PRIVATE

## 2019-06-25 PROCEDURE — 93005 ELECTROCARDIOGRAM TRACING: CPT | Performed by: PHYSICIAN ASSISTANT

## 2019-06-25 PROCEDURE — 80048 BASIC METABOLIC PNL TOTAL CA: CPT

## 2019-06-25 PROCEDURE — 97530 THERAPEUTIC ACTIVITIES: CPT

## 2019-06-25 PROCEDURE — 97162 PT EVAL MOD COMPLEX 30 MIN: CPT

## 2019-06-25 PROCEDURE — 93010 ELECTROCARDIOGRAM REPORT: CPT | Performed by: INTERNAL MEDICINE

## 2019-06-25 PROCEDURE — 2580000003 HC RX 258: Performed by: INTERNAL MEDICINE

## 2019-06-25 PROCEDURE — 84443 ASSAY THYROID STIM HORMONE: CPT

## 2019-06-25 PROCEDURE — 97165 OT EVAL LOW COMPLEX 30 MIN: CPT

## 2019-06-25 PROCEDURE — 6360000002 HC RX W HCPCS: Performed by: INTERNAL MEDICINE

## 2019-06-25 RX ORDER — NITROGLYCERIN 0.4 MG/1
0.4 TABLET SUBLINGUAL EVERY 5 MIN PRN
Status: DISCONTINUED | OUTPATIENT
Start: 2019-06-25 | End: 2019-06-26 | Stop reason: HOSPADM

## 2019-06-25 RX ORDER — ZOLPIDEM TARTRATE 5 MG/1
5 TABLET ORAL ONCE
Status: COMPLETED | OUTPATIENT
Start: 2019-06-25 | End: 2019-06-25

## 2019-06-25 RX ORDER — ISOSORBIDE MONONITRATE 30 MG/1
30 TABLET, EXTENDED RELEASE ORAL DAILY
Status: DISCONTINUED | OUTPATIENT
Start: 2019-06-25 | End: 2019-06-26 | Stop reason: HOSPADM

## 2019-06-25 RX ORDER — SPIRONOLACTONE 25 MG/1
12.5 TABLET ORAL DAILY
Status: DISCONTINUED | OUTPATIENT
Start: 2019-06-25 | End: 2019-06-26 | Stop reason: HOSPADM

## 2019-06-25 RX ADMIN — OXYCODONE HYDROCHLORIDE AND ACETAMINOPHEN 1 TABLET: 7.5; 325 TABLET ORAL at 17:13

## 2019-06-25 RX ADMIN — INSULIN LISPRO 1 UNITS: 100 INJECTION, SOLUTION INTRAVENOUS; SUBCUTANEOUS at 22:32

## 2019-06-25 RX ADMIN — SPIRONOLACTONE 12.5 MG: 25 TABLET ORAL at 13:40

## 2019-06-25 RX ADMIN — PANTOPRAZOLE SODIUM 40 MG: 40 TABLET, DELAYED RELEASE ORAL at 06:44

## 2019-06-25 RX ADMIN — INSULIN LISPRO 3 UNITS: 100 INJECTION, SOLUTION INTRAVENOUS; SUBCUTANEOUS at 11:48

## 2019-06-25 RX ADMIN — OXYCODONE HYDROCHLORIDE AND ACETAMINOPHEN 1 TABLET: 7.5; 325 TABLET ORAL at 23:35

## 2019-06-25 RX ADMIN — LEVOTHYROXINE SODIUM 100 MCG: 0.1 TABLET ORAL at 06:44

## 2019-06-25 RX ADMIN — Medication 10 ML: at 22:12

## 2019-06-25 RX ADMIN — ATORVASTATIN CALCIUM 80 MG: 40 TABLET, FILM COATED ORAL at 22:12

## 2019-06-25 RX ADMIN — INSULIN LISPRO 1 UNITS: 100 INJECTION, SOLUTION INTRAVENOUS; SUBCUTANEOUS at 08:41

## 2019-06-25 RX ADMIN — OXYCODONE HYDROCHLORIDE AND ACETAMINOPHEN 1 TABLET: 7.5; 325 TABLET ORAL at 08:48

## 2019-06-25 RX ADMIN — Medication 10 ML: at 22:13

## 2019-06-25 RX ADMIN — ZOLPIDEM TARTRATE 5 MG: 5 TABLET, FILM COATED ORAL at 02:01

## 2019-06-25 RX ADMIN — NITROGLYCERIN 0.4 MG: 0.4 TABLET SUBLINGUAL at 22:15

## 2019-06-25 RX ADMIN — FUROSEMIDE 40 MG: 10 INJECTION, SOLUTION INTRAMUSCULAR; INTRAVENOUS at 22:12

## 2019-06-25 RX ADMIN — GABAPENTIN 100 MG: 100 CAPSULE ORAL at 22:12

## 2019-06-25 RX ADMIN — FUROSEMIDE 40 MG: 10 INJECTION, SOLUTION INTRAMUSCULAR; INTRAVENOUS at 08:40

## 2019-06-25 RX ADMIN — OXAZEPAM 15 MG: 15 CAPSULE ORAL at 23:51

## 2019-06-25 RX ADMIN — OXAZEPAM 15 MG: 15 CAPSULE ORAL at 08:40

## 2019-06-25 RX ADMIN — INSULIN GLARGINE 6 UNITS: 100 INJECTION, SOLUTION SUBCUTANEOUS at 22:32

## 2019-06-25 RX ADMIN — ENOXAPARIN SODIUM 30 MG: 100 INJECTION SUBCUTANEOUS at 08:40

## 2019-06-25 RX ADMIN — ISOSORBIDE MONONITRATE 30 MG: 30 TABLET ORAL at 13:40

## 2019-06-25 RX ADMIN — OXYCODONE HYDROCHLORIDE AND ACETAMINOPHEN 1 TABLET: 7.5; 325 TABLET ORAL at 13:40

## 2019-06-25 RX ADMIN — NITROGLYCERIN 0.4 MG: 0.4 TABLET SUBLINGUAL at 14:37

## 2019-06-25 RX ADMIN — Medication 10 ML: at 08:40

## 2019-06-25 RX ADMIN — LATANOPROST 1 DROP: 50 SOLUTION OPHTHALMIC at 22:32

## 2019-06-25 RX ADMIN — CITALOPRAM HYDROBROMIDE 20 MG: 20 TABLET ORAL at 08:40

## 2019-06-25 RX ADMIN — GABAPENTIN 100 MG: 100 CAPSULE ORAL at 08:40

## 2019-06-25 RX ADMIN — OXAZEPAM 15 MG: 15 CAPSULE ORAL at 16:29

## 2019-06-25 RX ADMIN — ASPIRIN 81 MG 81 MG: 81 TABLET ORAL at 08:40

## 2019-06-25 ASSESSMENT — PAIN DESCRIPTION - PROGRESSION
CLINICAL_PROGRESSION: NOT CHANGED

## 2019-06-25 ASSESSMENT — PAIN DESCRIPTION - ONSET
ONSET: ON-GOING
ONSET: SUDDEN
ONSET: ON-GOING

## 2019-06-25 ASSESSMENT — PAIN DESCRIPTION - FREQUENCY
FREQUENCY: CONTINUOUS

## 2019-06-25 ASSESSMENT — PAIN DESCRIPTION - ORIENTATION
ORIENTATION: LEFT
ORIENTATION: MID
ORIENTATION: LEFT

## 2019-06-25 ASSESSMENT — PAIN DESCRIPTION - LOCATION
LOCATION: ARM
LOCATION: CHEST
LOCATION: ARM

## 2019-06-25 ASSESSMENT — PAIN DESCRIPTION - PAIN TYPE
TYPE: CHRONIC PAIN
TYPE: ACUTE PAIN
TYPE: CHRONIC PAIN

## 2019-06-25 ASSESSMENT — PAIN - FUNCTIONAL ASSESSMENT
PAIN_FUNCTIONAL_ASSESSMENT: PREVENTS OR INTERFERES WITH MANY ACTIVE NOT PASSIVE ACTIVITIES
PAIN_FUNCTIONAL_ASSESSMENT: ACTIVITIES ARE NOT PREVENTED
PAIN_FUNCTIONAL_ASSESSMENT: PREVENTS OR INTERFERES WITH MANY ACTIVE NOT PASSIVE ACTIVITIES
PAIN_FUNCTIONAL_ASSESSMENT: PREVENTS OR INTERFERES WITH MANY ACTIVE NOT PASSIVE ACTIVITIES

## 2019-06-25 ASSESSMENT — PAIN DESCRIPTION - DESCRIPTORS
DESCRIPTORS: ACHING;CONSTANT;DISCOMFORT
DESCRIPTORS: PRESSURE
DESCRIPTORS: ACHING;CONSTANT;DISCOMFORT
DESCRIPTORS: ACHING
DESCRIPTORS: ACHING;CONSTANT;DISCOMFORT

## 2019-06-25 ASSESSMENT — PAIN SCALES - GENERAL
PAINLEVEL_OUTOF10: 9
PAINLEVEL_OUTOF10: 3
PAINLEVEL_OUTOF10: 7
PAINLEVEL_OUTOF10: 3
PAINLEVEL_OUTOF10: 2
PAINLEVEL_OUTOF10: 8
PAINLEVEL_OUTOF10: 9

## 2019-06-25 NOTE — PROGRESS NOTES
Bedside report given to [facility], RN. Patient in stable condition. Care transferred. CHEMA Souza, RN.

## 2019-06-25 NOTE — CONSULTS
6/25/19 1048 consult Dr. Clara Whittaker.  Eva Ramírez [facility]/MT

## 2019-06-25 NOTE — PROGRESS NOTES
Inpatient Occupational Therapy  Evaluation and Treatment    Unit: PCU  Date:  6/25/2019  Patient Name:    Favian Hart  Admitting diagnosis:  CHF (congestive heart failure), NYHA class III, acute on chronic, combined [facility] [I50.43]  Admit Date:  6/23/2019  Precautions/Restrictions/WB Status/ Lines/ Wounds/ Oxygen: fall risk, IV and bed/chair alarm, L distal humerus fx (4/17) with NWB and long arm splint. 2L of O2    Treatment Time:  11:15-11:50  Treatment Number: 1   Billable Treatment Time: 23 minutes   Total Treatment Time:  35 minutes    Patient Goals for Therapy:  \" to walk \"      Discharge Recommendations: SNF  DME needs for discharge: defer to facility       Therapy recommendations for staff:   Assist of 1 with use of Single point cane for all ambulation to/from bathroom      Home Health S4 Level Recommendation:  NA  AM-PAC Score: 17    Preadmission Environment    Pt. Is staying at [facility] for re[facility]. Patient is unsure of discharge plans from the [place] when appropriate. Patient does not plan to live alone. Equipment owned:  [employer] Faster, cane, wheelchair     Preadmission Status / PLOF:  History of falls   No  Pt. Able to drive   No  Pt Fully independent with ADL's  No (patient can use to the toilet independently. Patient was independent prior to humerus fx in April)   Pt. Required assistance from family for:  Bathing, dressing,  Cooking, Cleaning and Laundry   Pt. Is receiving OT/PT at SNF   Pt. Has PRN assisr for transfers and gait and walked with: Cane in hallways    Pain  Yes  Rating:mild  Location:shoulder, back, and hips   Pain Medicine Status: Denies need      Cognition    A&O Person , Place  and Situation. Disoriented to date   Able to follow 2 step commands    Subjective  Patient lying supine in bed with no family present  Pt agreeable to this OT eval & tx.      Upper Extremity ROM:    Impaired R shoulder flexion to ~45 degrees  Impaired L UE due to humerus fx and placement of long splint    Upper Extremity Strength:    BUE strength impaired but not formally assessed w/ MMT    Upper Extremity Sensation    WFL    Upper Extremity Proprioception:   WFL    Coordination and Tone  Impaired    Balance  Functional Sitting Balance:  WFL  Functional Standing Balance:Diminished    Bed mobility:    Supine to sit:   Not Tested  Sit to supine:   Not Tested  Scooting to head of bed:   Not Tested  Scooting in sitting:  SBA  Rolling:   Not Tested  Bridging:   Not Tested    Transfers:    Sit to stand: Mod A  Stand to sit:  Min A  Bed to [facility]:  Not Tested  Bed to chair:   Not Tested  Standard toilet: Not Tested     Patient completed functional mobility in room with CGA and use of cane. Activity Tolerance   Pt completed therapy session with No nausea, dizziness, pain or SOB noted w/activity  SpO2: >91% on 2L of O2  HR:   BP:     Dressing:      UE: Mod A  LE:    Mod A    Bathing:    UE:  Not Tested  LE:  Not Tested    Eating:   Min A    Toileting:  Not Tested    Positioning Needs:   Up in chair, call light and needs in reach. Alarm Set    Exercise / Activities Initiated:   N/A    Patient/Family Education:   Role of OT  Energy conservation techniques    Assessment of Deficits: Pt seen for Occupational therapy evaluation in acute care setting. Pt demonstrated decreased Activity Tolerance, ADL's, Balance, Bathing, Bed Mobility, Dressing, ROM, Safety Awareness, Strength and Transfers. Pt functioning below baseline and will likely benefit from skilled occupational therapy services to maximize safety and independence. Goal(s) : To be met in 3 Visits:  1). Bed to toilet/BSC: CGA    To be met in 5 Visits:  1). Supine to Sit: CGA  2). Upper Body Bathing:  CGA  3). Lower Body Bathing:  Min A  4). Upper Body Dressing: CGA  5). Lower Body Dressing: Min A  6). Pt to yusra UE exs x 15 reps    Rehabilitation Potential:  Fair for goals listed above.     Strengths for achieving goals include: Pt motivated, PLOF and Pt cooperative  Barriers to achieving goals include:  Complexity of condition     Plan: To be seen 5 x/wk while in acute care setting for therapeutic exercises, bed mobility, transfers, dressing, bathing, family/patient education with adaptive equipment, breathing technique instruction.        Gerber Ramos, OTR/L #[id number]    If patient discharges from this facility prior to next visit, this note will serve as the Discharge Summary

## 2019-06-25 NOTE — PROGRESS NOTES
Patient's EF (Ejection Fraction) is greater than 40%    Patient's weights and intake/output reviewed:    Patient's Last Weight: 134 lbs obtained by bed scale. Today's weight is noted to be 0 lb more than last documented weight. Intake/Output Summary (Last 24 hours) at 6/25/2019 0801  Last data filed at 6/24/2019 2325  Gross per 24 hour   Intake 360 ml   Output 1100 ml   Net -740 ml       Patient's current functional capacity:  Unable to carry on any physical activity without discomfort. Symptoms of heart failure at rest.    Pt resting in bed at this time on 3 L O2. Pt denies shortness of breath. Pt with nonpitting lower extremity edema. Patient and family's stated goal of care: reduce shortness of breath, increase activity tolerance, better understand heart failure and disease management, be more comfortable and reduce lower extremity edema prior to discharge        Patient has a past medical history of Anxiety, CAD (coronary artery disease), Cancer ([address] Cerebral artery occlusion with cerebral infarction ([address] CHF (congestive heart failure) ([address] Depression, Diabetes mellitus ([address] Glaucoma, Hypertension, Macular degeneration, Thyroid disease, and TIA (transient ischemic attack). Comorbidities reviewed and education provided.     >>For CHF and Comorbidity documentation on Education Time and Topics, please see Education Tab

## 2019-06-25 NOTE — PROGRESS NOTES
Shift assessment complete- see flow sheet. Patient is A/Ox4, VSS. Patient is awake and up in chair. Patient with complaints of chronic pain to LUE- relieved by PRN pain percocet. Patient denies any further needs. Call light explained and in reach. Chair alarm set. Will continue to monitor. EUGENIA GuoN, RN.

## 2019-06-25 NOTE — PROGRESS NOTES
Progress Note    Admit Date:  6/23/2019    Subjective:  Ms. Natalia Santana is confused. Objective:   Patient Vitals for the past 4 hrs:   BP Temp Temp src Pulse Resp SpO2   06/25/19 0826 121/89 98.3 °F (36.8 °C) Oral 92 18 92 %            Intake/Output Summary (Last 24 hours) at 6/25/2019 1025  Last data filed at 6/25/2019 0840  Gross per 24 hour   Intake 490 ml   Output 1100 ml   Net -610 ml       Physical Exam:    Gen: No distress. Alert. Eyes: PERRL. No sclera icterus. No conjunctival injection. ENT: No discharge. Pharynx clear. Neck: No JVD. Trachea midline. Resp: No accessory muscle use. + minimal crackles. No wheezes. No rhonchi. CV: Regular rate. Regular rhythm. No murmur. No rub. + trace edema. GI: Non-tender. Non-distended. Normal bowel sounds. Skin: Warm and dry. No nodule on exposed extremities. No rash on exposed extremities. M/S: No cyanosis. No joint deformity. No clubbing. LUE in splint   Neuro: Awake. Grossly nonfocal    Psych: Oriented x1 . No anxiety or agitation. ANI Marina have reviewed the chart on [facility] and personally interviewed and examined patient, reviewed the data (labs and imaging) and after discussion with my PA formulated the plan. Agree with note with the following edits. Physical exam:    /89   Pulse 92   Temp 98.3 °F (36.8 °C) (Oral)   Resp 18   Ht 5' 2\" (1.575 m)   Wt 134 lb 1.6 oz (60.8 kg)   SpO2 92%   BMI 24.53 kg/m²     Gen: No distress. Alert. Eyes: PERRL. No sclera icterus. No conjunctival injection. ENT: No discharge. Pharynx clear. Neck: Trachea midline. Normal thyroid. Resp: No accessory muscle use. Bibasilar  crackles. No wheezes. No rhonchi. No dullness on percussion. CV: Regular rate. Regular rhythm. No murmur or rub. Trace  edema. GI: Non-tender. Non-distended. No masses. No organomegaly. Normal bowel sounds. No hernia. Skin: Warm and dry. No nodule on exposed extremities.  No rash on exposed extremities. Lymph: No cervical LAD. No supraclavicular LAD. M/S: No cyanosis. No joint deformity. No clubbing. Neuro: Awake. Moves all 4 extremities, non focal  Psych: Oriented x 2. No anxiety or agitation. INDRA Matthews.        Scheduled Meds:   aspirin  81 mg Oral Daily    atorvastatin  80 mg Oral Nightly    levothyroxine  100 mcg Oral Daily    sodium chloride flush  10 mL Intravenous 2 times per day    enoxaparin  30 mg Subcutaneous Daily    furosemide  40 mg Intravenous BID    gabapentin  100 mg Oral BID    insulin glargine  6 Units Subcutaneous Nightly    latanoprost  1 drop Both Eyes Nightly    citalopram  20 mg Oral Daily    insulin lispro  0-6 Units Subcutaneous TID WC    insulin lispro  0-3 Units Subcutaneous Nightly    pantoprazole  40 mg Oral QAM AC       Continuous Infusions:   dextrose         PRN Meds:  sodium chloride flush, ondansetron, potassium chloride **OR** potassium alternative oral replacement **OR** potassium chloride, oxyCODONE-acetaminophen, glucose, dextrose, glucagon (rDNA), dextrose, oxazepam      Data:  CBC:   Recent Labs     06/23/19  2300 06/25/19  0506   WBC 14.6* 10.1   HGB 11.4* 11.2*   HCT 34.3* 33.3*   MCV 95.4 93.4    331     BMP:   Recent Labs     06/23/19  2300 06/24/19  0339 06/25/19  0506   * 134* 132*   K 4.2 3.7 4.1   CL 93* 94* 92*   CO2 28 28 30   BUN 22* 20 21*   CREATININE 1.2 1.1 1.2     LIVER PROFILE:   Recent Labs     06/23/19  2300   AST 13*   ALT 10   BILITOT 0.5   ALKPHOS 92     PT/INR: No results for input(s): PROTIME, INR in the last 72 hours. CULTURES  Blood: NGTD     RADIOLOGY  XR CHEST STANDARD (2 VW)   Final Result   Findings suggest congestive heart failure. Echo 4/17/19   Technically difficult examination.  Pt could not lay back and left arm   immobilized secondary to fracture.   Left ventricular systolic function is normal with ejection fraction   estimated at 55%.   No regional wall motion abnormalities.   Grade I diastolic dysfunction with normal left ventricular filling pressure.   The left atrium is moderately dilated.   The right atrium is mildly dilated. Assessment/Plan:    #Acute diastolic CHF  - home dose of demadex is 40 mg daily  - IV lasix 40 mg BID   - I/o  - cardio c/s     #Elevated troponin   - flat elevation, no ischemic EKG changes, unreliable historian. No prior elevation. C/s cardio      #Acute hypoxic respiratory failure   - related to CHF, wean O2 as able     #CKD3  - stable     #CAD  - per EMR  - cont home ASA and statin     #DM2  - SSI    #LUE fracture   - in splint     #Dementia  - pleasantly confused- seems to be at baseline  - supportive care    DVT Prophylaxis: Lovenox   Diet: DIET CARB CONTROL; Low Sodium (2 GM); Daily Fluid Restriction: 2000 ml  Code Status: DNR-TYRONE Kim PA-C  6/25/2019 10:27 AM      1. Acute diastolic congestive heart failure. Placed on IV Lasix. Follow I's and O's closely. daily weights and renal function monitoring. 2.  Acute hypoxic respiratory failure. Related to CHF. Wean oxygen if possible. INDRA Matthews.

## 2019-06-25 NOTE — PROGRESS NOTES
Inpatient Physical Therapy Evaluation and Treatment    Unit: PCU  Date:  6/25/2019  Patient Name:    Servando Lopez  Admitting diagnosis:  CHF (congestive heart failure), NYHA class III, acute on chronic, combined ([facility] [I50.43]  Admit Date:  6/23/2019  Precautions/Restrictions/WB Status/ Lines/ Wounds/ Oxygen: fall risk, IV, bed/chair alarm, supplemental o2 (2L), WB restrictions (NWB LUE in sling; this was confirmed with pt's nurse at [address] [place] [address] by MORENITA PATEL)  and telemetry    Treatment Time:  1115-1150  Treatment Number:  1   Timed Code Treatment Minutes: 25 minutes  Total Treatment Minutes:  35  minutes    Patient Goals for Therapy: \" to go home \"          Discharge Recommendations: SNF  DME needs for discharge: defer to facility     Would benefit from consult to ortho or other appropriate service for recommendation on when to remove brace, increase weight-bearing if able. Will mention this to internal medicine team.   Follow up: pt is to remain NWB through LUE in sling indefinitely. Pt is followed by Dr. Gab Bell while at [address] [place] [address]. Therapy recommendations for staff:   Assist of 1 with use of Single point cane for all transfers or [facility]    Home Health S4 Level Recommendation:  NA  AM-PAC Mobility Score    AM-PAC Inpatient Mobility Raw Score : 17       Preadmission Environment    Pt. Is staying at [facility] for rehab. Patient is unsure of discharge plans from the [place] when appropriate. Patient does not plan to live alone. Equipment owned:      [address], cane, wheelchair      Preadmission Status / PLOF:  History of falls             No  Pt. Able to drive          No  Pt Fully independent with ADL's         No (patient can use the toilet independently. Patient was independent prior to humerus fx in April)   Pt. Required assistance from family for:  Bathing, dressing,  Cooking, Cleaning and Laundry   Pt. Is receiving OT/PT at SNF (recently was discharged from their service)   Pt. POC, importance of continued activity, safety awareness, transfer techniques and calling for assist with mobility. Assessment  Pt seen for Physical Therapy evaluation in acute care setting. Pt demonstrated decreased Activity tolerance, Balance, Safety and Strength and decreased independence with Ambulation, Bed Mobility  and Transfers. Goals : To be met in 3 visits:  1). Independent with LE Ex x 10 reps    To be met in 6 visits:  1). Supine to/from sit: Modified Independent  2). Sit to/from stand: SBA  3). Bed to chair: SBA and with use of RW  4). Gait: Ambulate 150 ft   with  CGA  and use of LRAD  5). Tolerate B LE exercises 3 sets of 10-15 reps    Rehabilitation Potential: Good  Strengths for achieving goals include:   Pt motivated, PLOF and Pt cooperative  Barriers to achieving goals include:    Pain     Plan    To be seen 5x / week  while in acute care setting for therapeutic exercises, bed mobility, transfers, progressive gait training, balance training, and family/patient education. Fannie Gilliland, PT, DPT #[id number]     If patient discharges from this facility prior to next visit, this note will serve as the Discharge Summary.

## 2019-06-25 NOTE — CONSULTS
[facility]  [phone]        Reason for Consultation/Chief Complaint: \"I have been having shortness of breath . \"    History of Present Illness:  Arvin Kennedy is a 80 y.o. patient who presented to the hospital with complaints of  Acute onset of shortness of breath. Symptoms started 3 days ago. Patient is very hard of hearing inspite of hearing aids. I could talk to her directly speaking in to her ear. She has chest heaviness off and on that goes away with nitro. Currently not feeling this. Symptoms of shortness of breath  worse with exertion. She has prior history of CHF CAD DM HBP TIA and breast cancer. Prior history of CABG  . I have been asked to provide consultation regarding further management and testing. Past Medical History:   has a past medical history of Anxiety, CAD (coronary artery disease), Cancer ([address] Cerebral artery occlusion with cerebral infarction ([address] CHF (congestive heart failure) ([address] Depression, Diabetes mellitus ([address] Glaucoma, Hypertension, Macular degeneration, Thyroid disease, and TIA (transient ischemic attack). Surgical History:   has a past surgical history that includes Cholecystectomy; Cardiac surgery; Thyroidectomy; Spine surgery; Coronary artery bypass graft; and Hysterectomy. Social History:   reports that she quit smoking about 20 years ago. She has never used smokeless tobacco. She reports that she does not drink alcohol or use drugs. Family History:  family history is not on file. Home Medications:  Were reviewed and are listed in nursing record. and/or listed below  Prior to Admission medications    Medication Sig Start Date End Date Taking? Authorizing Provider   gabapentin (NEURONTIN) 100 MG capsule Take 100 mg by mouth 2 times daily.    Yes Historical Provider, MD   insulin glargine (BASAGLAR KWIKPEN) 100 UNIT/ML injection pen Inject 6 Units into the skin nightly   Yes Historical Provider, MD riveraemide [facility]) 20 MG tablet Take 40 mg by mouth daily   Yes Historical Provider, MD   aspirin 81 MG chewable tablet Take 81 mg by mouth daily   Yes Historical Provider, MD   fluticasone-vilanterol (BREO ELLIPTA) 100-25 MCG/INH AEPB inhaler Inhale into the lungs daily   Yes Historical Provider, MD   Docusate Sodium 100 MG TABS Take by mouth 2 times daily    Yes Historical Provider, MD   glucose (GLUTOSE) 40 % GEL Take 15 g by mouth See Admin Instructions   Yes Historical Provider, MD   guaiFENesin 400 MG tablet Take 600 mg by mouth 2 times daily as needed for Cough    Yes Historical Provider, MD   Latanoprost-Timolol Maleate 0.005-0.5 % SOLN Apply to eye nightly    Yes Historical Provider, MD   LORATADINE ALLERGY RELIEF PO Take 10 mg by mouth   Yes Historical Provider, MD   omeprazole (PRILOSEC) 20 MG delayed release capsule Take 20 mg by mouth daily   Yes Historical Provider, MD   oxyCODONE-acetaminophen (PERCOCET) 7.5-325 MG per tablet Take 1 tablet by mouth every 4 hours as needed for Pain. Yes Historical Provider, MD   POTASSIUM CHLORIDE ER PO Take 20 mEq by mouth   Yes Historical Provider, MD   insulin lispro (HUMALOG) 100 UNIT/ML injection vial Inject 6 Units into the skin 2 times daily   Yes Historical Provider, MD   oxazepam (SERAX) 15 MG capsule Take 15 mg by mouth 3 times daily as needed for Sleep or Anxiety.    Yes Historical Provider, MD   lidocaine (LIDODERM) 5 % Place 2 patches onto the skin daily 12 hours on, 12 hours off. 12/6/18  Yes Marisela Sullivan DO   dorzolamide (TRUSOPT) 2 % ophthalmic solution Place 1 drop into both eyes 2 times daily   Yes Historical Provider, MD   calcitRIOL (ROCALTROL) 0.25 MCG capsule Take 0.25 mcg by mouth See Admin Instructions Takes only on tuesday and friday   Yes Historical Provider, MD   traZODone (DESYREL) 50 MG tablet Take 150 mg by mouth nightly    Yes Historical Provider, MD   citalopram (CELEXA) 20 MG tablet Take 20 mg by mouth daily   Yes Historical Provider, MD   atorvastatin (LIPITOR) 80 MG tablet Take 80 mg by mouth nightly    Yes Historical Provider, MD   levothyroxine (SYNTHROID) 100 MCG tablet Take 100 mcg by mouth daily   Yes Historical Provider, MD   nitroGLYCERIN (NITROSTAT) 0.4 MG SL tablet Place 0.4 mg under the tongue every 5 minutes as needed for Chest pain   Yes Historical Provider, MD   ferrous sulfate 325 (65 FE) MG tablet Take 325 mg by mouth 2 times daily (with meals)    Yes Historical Provider, MD   mometasone-formoterol (DULERA) 100-5 MCG/ACT inhaler Inhale 2 puffs into the lungs 2 times daily as needed    Historical Provider, MD   desloratadine (CLARINEX) 5 MG tablet Take 5 mg by mouth daily    Historical Provider, MD   bimatoprost (LUMIGAN) 0.01 % SOLN ophthalmic drops Place 1 drop into both eyes nightly    Historical Provider, MD        Allergies:  Aciphex [rabeprazole]; Altace [ramipril]; Codeine; Cozaar [losartan]; Duloxetine; Iodides; Iodine; Sulfa antibiotics; and Ativan [lorazepam]     Review of Systems: 12 point ROS negative in all areas as listed below except as in [place]  Constitutional, EENT, Cardiovascular, pulmonary, GI, , Musculoskeletal, skin, neurological, hematological, endocrine, Psychiatric    Physical Examination:    Vitals:    06/25/19 0826   BP: 121/89   Pulse: 92   Resp: 18   Temp: 98.3 °F (36.8 °C)   SpO2: 92%    Weight: 134 lb 1.6 oz (60.8 kg)         General Appearance:  Alert, cooperative, no distress, appears stated age   Head:  Normocephalic, without obvious abnormality, atraumatic   Eyes:  PERRL, conjunctiva/corneas clear       Nose: Nares normal, no drainage or sinus tenderness   Throat: Lips, mucosa, and tongue normal   Neck: Supple, symmetrical, trachea midline, no adenopathy, thyroid: not enlarged, symmetric, no tenderness/mass/nodules, no carotid bruit, +ve  JVD       Lungs:   Crackles at lower half of lung fields to auscultation bilaterally, respirations unlabored she is on nasal O2.    Chest Wall:  No tenderness or deformity Heart:  Regular rate and rhythm, S1, S2 normal, no murmur, rub or gallop   Abdomen:   Soft, non-tender, bowel sounds active all four quadrants,  no masses, no organomegaly           Extremities: Extremities normal, atraumatic, no cyanosis , 2 + bilateral lower 1/3 legs  edema   Pulses: Neg in feet   Skin: Skin color, texture, turgor normal, no rashes or lesions   Pysch: Normal mood and affect   Neurologic: Normal gross motor and sensory exam.         Labs  CBC:   Lab Results   Component Value Date    WBC 10.1 06/25/2019    RBC 3.57 06/25/2019    HGB 11.2 06/25/2019    HCT 33.3 06/25/2019    MCV 93.4 06/25/2019    RDW 14.2 06/25/2019     06/25/2019     CMP:    Lab Results   Component Value Date     06/25/2019    K 4.1 06/25/2019    K 4.4 04/23/2019    CL 92 06/25/2019    CO2 30 06/25/2019    BUN 21 06/25/2019    CREATININE 1.2 06/25/2019    GFRAA 51 06/25/2019    AGRATIO 0.9 06/23/2019    LABGLOM 42 06/25/2019    GLUCOSE 192 06/25/2019    PROT 6.2 06/23/2019    CALCIUM 8.8 06/25/2019    BILITOT 0.5 06/23/2019    ALKPHOS 92 06/23/2019    AST 13 06/23/2019    ALT 10 06/23/2019     PT/INR:  No results found for: PTINR  Lab Results   Component Value Date    TROPONINI 0.05 (H) 06/24/2019     Trop 0.05, 0.06, 0.05  EKG:  I have reviewed EKG with the following interpretation:  Impression:   Diagnosis   Final 06/24/2019 12:29 AM 14   Sinus rhythm with occasional Premature atrial complexesPossible Inferior infarct , age undeterminedAbnormal ECGNo significant change was foundWhen compared with ECG of4.17.19Confirmed by TIFFANIE GAMEZ MD ([id number]) on 6/24/2019 11:47:28 AM     Chest xray 6.23.19     FINDINGS:   Pulmonary edema is identified with bibasilar airspace disease.  Left pleural   effusion is again noted.  The heart size is at the upper limits of normal.   Sternal wires and mediastinal clips are again seen.  There is no discernible   pneumothorax.         Echo 4.17.19   Summary   Technically difficult examination. Pt could not lay back and left arm   immobilized secondary to fracture.   Left ventricular systolic function is normal with ejection fraction   estimated at 55%.   No regional wall motion abnormalities.   Grade I diastolic dysfunction with normal left ventricular filling pressure.   The left atrium is moderately dilated.   The right atrium is mildly dilated.     Assessment  Acute on chronic diastolic CHF  CAD with stable angina  Hypertension  TIA      Plan:    I had the opportunity to review the clinical symptoms and presentation of Sherrill Angulo. I recommend that the patient undergo diuresis  Will check thyroid function  Main optimal BP  Monitor urinary out put and renal function  I will start her on oral imdur to reduce preload and help with angina  Salt and fluid management  CHF education started by me  Secondary prevention to include antiplatelets statins     I will address the patient's cardiac risk factors and adjusted pharmacologic treatment as needed. In addition, I have reinforced the need for patient directed risk factor modification. Tobacco use was discussed with the patient and educated on the negative effects. I have asked the patient to not utilize these agents. NA    Thank you for allowing to us to participate in the care or [address] [place] [address]. Further evaluation will be based upon the patient's clinical course and testing results. All questions and concerns were addressed to the patient/family. Alternatives to my treatment were discussed. The note was completed using EMR. Every effort was made to ensure accuracy; however, inadvertent computerized transcription errors may be present.

## 2019-06-25 NOTE — FLOWSHEET NOTE
06/25/19 1440   Vital Signs   Temp 97.5 °F (36.4 °C)   Temp Source Oral   Pulse 81   Heart Rate Source Monitor   Resp 18   BP (!) 99/59   BP Location Right upper arm   BP Upper/Lower Upper   Level of Consciousness 0   MEWS Score 2   Patient Currently in Pain Yes   Pain Assessment   Pain Assessment 0-10   Pain Level 3   Patient's Stated Pain Goal No pain   Pain Type Acute pain   Pain Location Chest   Pain Orientation Mid   Pain Radiating Towards back/shoulder blades   Pain Descriptors Pressure   Pain Frequency Continuous   Pain Onset Sudden   Clinical Progression Not changed   Functional Pain Assessment Activities are not prevented   Non-Pharmaceutical Pain Intervention(s) Rest   Oxygen Therapy   SpO2 94 %   O2 Device Nasal cannula   O2 Flow Rate (L/min) 2 L/min       Patient with chest pressure as stated above, patient's daughter requesting that SL nitro be given- given per [facility] order. PA notified. STAT EKG ordered. Will continue to monitor. EUGENIA GonzalezN, RN.

## 2019-06-25 NOTE — PROGRESS NOTES
Assisted patient to bed and comfort measures provided. She appears anxious. Active listening and calming measures provided. She denies any further needs. Call light within reach.

## 2019-06-25 NOTE — PROGRESS NOTES
Shift assessment complete as charted. Patient sitting up in chair with unlabored breathing. She is alert and oriented at this time. Meds administered as per MAR. VSS. Says she spoke w her family regarding blanket, she appears less worried at this time. Comfort measures provided and call light within reach.

## 2019-06-26 VITALS
TEMPERATURE: 97.5 F | RESPIRATION RATE: 18 BRPM | DIASTOLIC BLOOD PRESSURE: 65 MMHG | OXYGEN SATURATION: 94 % | SYSTOLIC BLOOD PRESSURE: 140 MMHG | WEIGHT: 128.5 LBS | BODY MASS INDEX: 23.65 KG/M2 | HEART RATE: 86 BPM | HEIGHT: 62 IN

## 2019-06-26 LAB
ANION GAP SERPL CALCULATED.3IONS-SCNC: 11 MMOL/L (ref 3–16)
BUN BLDV-MCNC: 20 MG/DL (ref 7–20)
CALCIUM SERPL-MCNC: 8.8 MG/DL (ref 8.3–10.6)
CHLORIDE BLD-SCNC: 88 MMOL/L (ref 99–110)
CO2: 33 MMOL/L (ref 21–32)
CREAT SERPL-MCNC: 1.1 MG/DL (ref 0.6–1.2)
GFR AFRICAN AMERICAN: 56
GFR NON-AFRICAN AMERICAN: 47
GLUCOSE BLD-MCNC: 118 MG/DL (ref 70–99)
GLUCOSE BLD-MCNC: 118 MG/DL (ref 70–99)
GLUCOSE BLD-MCNC: 120 MG/DL (ref 70–99)
GLUCOSE BLD-MCNC: 233 MG/DL (ref 70–99)
MAGNESIUM: 2 MG/DL (ref 1.8–2.4)
PERFORMED ON: ABNORMAL
POTASSIUM SERPL-SCNC: 4.1 MMOL/L (ref 3.5–5.1)
SODIUM BLD-SCNC: 132 MMOL/L (ref 136–145)

## 2019-06-26 PROCEDURE — 6370000000 HC RX 637 (ALT 250 FOR IP): Performed by: INTERNAL MEDICINE

## 2019-06-26 PROCEDURE — 99232 SBSQ HOSP IP/OBS MODERATE 35: CPT | Performed by: NURSE PRACTITIONER

## 2019-06-26 PROCEDURE — 6370000000 HC RX 637 (ALT 250 FOR IP): Performed by: PHYSICIAN ASSISTANT

## 2019-06-26 PROCEDURE — 80048 BASIC METABOLIC PNL TOTAL CA: CPT

## 2019-06-26 PROCEDURE — 6360000002 HC RX W HCPCS: Performed by: INTERNAL MEDICINE

## 2019-06-26 PROCEDURE — 83735 ASSAY OF MAGNESIUM: CPT

## 2019-06-26 PROCEDURE — 2580000003 HC RX 258: Performed by: INTERNAL MEDICINE

## 2019-06-26 PROCEDURE — 36415 COLL VENOUS BLD VENIPUNCTURE: CPT

## 2019-06-26 PROCEDURE — 99239 HOSP IP/OBS DSCHRG MGMT >30: CPT | Performed by: INTERNAL MEDICINE

## 2019-06-26 RX ORDER — TORSEMIDE 20 MG/1
40 TABLET ORAL DAILY
Qty: 1 TABLET | Refills: 0
Start: 2019-06-26

## 2019-06-26 RX ORDER — ISOSORBIDE MONONITRATE 30 MG/1
30 TABLET, EXTENDED RELEASE ORAL DAILY
Qty: 30 TABLET | Refills: 0
Start: 2019-06-27

## 2019-06-26 RX ORDER — OXYCODONE AND ACETAMINOPHEN 7.5; 325 MG/1; MG/1
1 TABLET ORAL EVERY 4 HOURS PRN
Qty: 10 TABLET | Refills: 0 | Status: SHIPPED | OUTPATIENT
Start: 2019-06-26 | End: 2019-06-28

## 2019-06-26 RX ORDER — OXAZEPAM 15 MG/1
15 CAPSULE ORAL 3 TIMES DAILY PRN
Qty: 6 CAPSULE | Refills: 0 | Status: SHIPPED | OUTPATIENT
Start: 2019-06-26 | End: 2019-06-28

## 2019-06-26 RX ORDER — SPIRONOLACTONE 25 MG/1
12.5 TABLET ORAL DAILY
Qty: 30 TABLET | Refills: 0
Start: 2019-06-27

## 2019-06-26 RX ADMIN — FUROSEMIDE 40 MG: 10 INJECTION, SOLUTION INTRAMUSCULAR; INTRAVENOUS at 09:10

## 2019-06-26 RX ADMIN — ASPIRIN 81 MG 81 MG: 81 TABLET ORAL at 09:10

## 2019-06-26 RX ADMIN — GABAPENTIN 100 MG: 100 CAPSULE ORAL at 09:10

## 2019-06-26 RX ADMIN — OXYCODONE HYDROCHLORIDE AND ACETAMINOPHEN 1 TABLET: 7.5; 325 TABLET ORAL at 06:26

## 2019-06-26 RX ADMIN — ENOXAPARIN SODIUM 30 MG: 100 INJECTION SUBCUTANEOUS at 09:10

## 2019-06-26 RX ADMIN — PANTOPRAZOLE SODIUM 40 MG: 40 TABLET, DELAYED RELEASE ORAL at 06:20

## 2019-06-26 RX ADMIN — SPIRONOLACTONE 12.5 MG: 25 TABLET ORAL at 09:09

## 2019-06-26 RX ADMIN — ISOSORBIDE MONONITRATE 30 MG: 30 TABLET ORAL at 09:09

## 2019-06-26 RX ADMIN — OXYCODONE HYDROCHLORIDE AND ACETAMINOPHEN 1 TABLET: 7.5; 325 TABLET ORAL at 11:18

## 2019-06-26 RX ADMIN — INSULIN LISPRO 2 UNITS: 100 INJECTION, SOLUTION INTRAVENOUS; SUBCUTANEOUS at 11:39

## 2019-06-26 RX ADMIN — OXAZEPAM 15 MG: 15 CAPSULE ORAL at 09:16

## 2019-06-26 RX ADMIN — Medication 10 ML: at 09:10

## 2019-06-26 RX ADMIN — OXYCODONE HYDROCHLORIDE AND ACETAMINOPHEN 1 TABLET: 7.5; 325 TABLET ORAL at 15:23

## 2019-06-26 RX ADMIN — CITALOPRAM HYDROBROMIDE 20 MG: 20 TABLET ORAL at 09:10

## 2019-06-26 RX ADMIN — LEVOTHYROXINE SODIUM 100 MCG: 0.1 TABLET ORAL at 06:20

## 2019-06-26 ASSESSMENT — PAIN - FUNCTIONAL ASSESSMENT
PAIN_FUNCTIONAL_ASSESSMENT: PREVENTS OR INTERFERES WITH MANY ACTIVE NOT PASSIVE ACTIVITIES
PAIN_FUNCTIONAL_ASSESSMENT: PREVENTS OR INTERFERES WITH MANY ACTIVE NOT PASSIVE ACTIVITIES

## 2019-06-26 ASSESSMENT — PAIN DESCRIPTION - DESCRIPTORS
DESCRIPTORS: ACHING;CONSTANT;DISCOMFORT
DESCRIPTORS: ACHING;CONSTANT;NAGGING
DESCRIPTORS: ACHING;CONSTANT;DISCOMFORT

## 2019-06-26 ASSESSMENT — PAIN SCALES - GENERAL
PAINLEVEL_OUTOF10: 7
PAINLEVEL_OUTOF10: 7
PAINLEVEL_OUTOF10: 2
PAINLEVEL_OUTOF10: 6

## 2019-06-26 ASSESSMENT — ENCOUNTER SYMPTOMS
BACK PAIN: 1
SHORTNESS OF BREATH: 0
CHEST TIGHTNESS: 0
COUGH: 0

## 2019-06-26 ASSESSMENT — PAIN DESCRIPTION - FREQUENCY
FREQUENCY: CONTINUOUS

## 2019-06-26 ASSESSMENT — PAIN DESCRIPTION - PAIN TYPE
TYPE: CHRONIC PAIN

## 2019-06-26 ASSESSMENT — PAIN DESCRIPTION - LOCATION
LOCATION: ARM

## 2019-06-26 ASSESSMENT — PAIN DESCRIPTION - ONSET
ONSET: ON-GOING

## 2019-06-26 ASSESSMENT — PAIN DESCRIPTION - PROGRESSION
CLINICAL_PROGRESSION: NOT CHANGED
CLINICAL_PROGRESSION: NOT CHANGED

## 2019-06-26 ASSESSMENT — PAIN DESCRIPTION - ORIENTATION
ORIENTATION: LEFT

## 2019-06-26 NOTE — PROGRESS NOTES
oxyCODONE-acetaminophen, glucose, dextrose, glucagon (rDNA), dextrose, oxazepam      Data:  CBC:   Recent Labs     06/23/19  2300 06/25/19  0506   WBC 14.6* 10.1   HGB 11.4* 11.2*   HCT 34.3* 33.3*   MCV 95.4 93.4    331     BMP:   Recent Labs     06/24/19  0339 06/25/19  0506 06/26/19  0459   * 132* 132*   K 3.7 4.1 4.1   CL 94* 92* 88*   CO2 28 30 33*   BUN 20 21* 20   CREATININE 1.1 1.2 1.1     LIVER PROFILE:   Recent Labs     06/23/19  2300   AST 13*   ALT 10   BILITOT 0.5   ALKPHOS 92     PT/INR: No results for input(s): PROTIME, INR in the last 72 hours. CULTURES  Blood: NGTD     RADIOLOGY  XR CHEST STANDARD (2 VW)   Final Result   Findings suggest congestive heart failure. Echo 4/17/19   Technically difficult examination. Pt could not lay back and left arm   immobilized secondary to fracture.   Left ventricular systolic function is normal with ejection fraction   estimated at 55%.   No regional wall motion abnormalities.   Grade I diastolic dysfunction with normal left ventricular filling pressure.   The left atrium is moderately dilated.   The right atrium is mildly dilated. Assessment/Plan:    #Acute diastolic CHF  - home dose of demadex is 40 mg daily  - IV lasix 40 mg BID   - I/o  - cardio c/s   Aldactone added   Good diuresis    #Elevated troponin   - flat elevation, no ischemic EKG changes, unreliable historian. No prior elevation. C/s cardio. No new recommendations     #Acute hypoxic respiratory failure   Short od breath requiring 3 L of O2  - related to CHF, wean O2 as able     #CKD3  - stable     #CAD  - per EMR  - cont home ASA and statin     #DM2  - SSI    #LUE fracture   - in splint     #Dementia  - pleasantly confused- seems to be at baseline  - supportive care    DVT Prophylaxis: Lovenox   Diet: DIET CARB CONTROL; Low Sodium (2 GM); Daily Fluid Restriction: 2000 ml  Code Status: DNR-CCA     D/c to [facility]     FIONA Matthews

## 2019-06-26 NOTE — PROGRESS NOTES
Patient up in chair, states has a fullness in her chest, has at home off and on, asks for nitro, lung sounds crackles bilaterally, patient states the lasix will not help, asking for nitro again, states it is not heart related, but insists it helps, states had one earlier today and it helped, VS complete, one nitro given, lasix also given, will reassess BP, asking also for pain medication and anxiety medication. RN states will hold off on that for now, not due at this time.

## 2019-06-26 NOTE — PROGRESS NOTES
Shift assessment complete- see flow sheet. Patient is A/Ox4. VSS. Patient is awake and up in chair. Patient is currently on 2 L NC. SpO2 WNL. Patient with complaints of dyspnea on exertion. Lung sounds diminished with crackles. Patient with c/o chronic pain in her L arm that is relieved with PRN pain medication. Patient denies any further needs. Call light explained and in reach. Chair alarm set. Will continue to monitor. CHEMA Moya, RN.

## 2019-06-26 NOTE — PLAN OF CARE
Patient's EF (Ejection Fraction) is greater than 40%    Patient's weights and intake/output reviewed:    Patient's Last Weight: 128 lbs obtained by standing scale. Today's weight is noted to be 8 lbsless than last documented weight. Intake/Output Summary (Last 24 hours) at 6/26/2019 0943  Last data filed at 6/26/2019 0910  Gross per 24 hour   Intake 670 ml   Output 1125 ml   Net -455 ml       Patient's current functional capacity:  Slight limitation of physical activity. Comfortable at rest. Ordinary physical activity results in fatigue, palpitation, dyspnea. Pt up in chair at this time on 2 L O2. Pt with complaints of shortness of breath. Pt without lower extremity edema. Patient and family's stated goal of care: reduce shortness of breath, increase activity tolerance, better understand heart failure and disease management, be more comfortable and reduce lower extremity edema prior to discharge        Patient has a past medical history of Anxiety, CAD (coronary artery disease), Cancer ([address] Cerebral artery occlusion with cerebral infarction ([address] CHF (congestive heart failure) ([address] Depression, Diabetes mellitus ([address] Glaucoma, Hypertension, Macular degeneration, Thyroid disease, and TIA (transient ischemic attack). Comorbidities reviewed and education provided.     >>For CHF and Comorbidity documentation on Education Time and Topics, please see Education Tab
Patient's EF (Ejection Fraction) is greater than 40%    Patient's weights and intake/output reviewed:    Patient's Last Weight: 134 lbs obtained by bed scale. Today's weight is noted to be 0 lbs difference  than last documented weight. Intake/Output Summary (Last 24 hours) at 6/25/2019 1026  Last data filed at 6/25/2019 0840  Gross per 24 hour   Intake 490 ml   Output 1100 ml   Net -610 ml       Patient's current functional capacity:  Slight limitation of physical activity. Comfortable at rest. Ordinary physical activity results in fatigue, palpitation, dyspnea. Pt up in chair at this time on 2 L O2. Pt denies shortness of breath. Pt with nonpitting lower extremity edema. Patient and family's stated goal of care: reduce shortness of breath, increase activity tolerance, better understand heart failure and disease management, be more comfortable and reduce lower extremity edema prior to discharge        Patient has a past medical history of Anxiety, CAD (coronary artery disease), Cancer ([address] Cerebral artery occlusion with cerebral infarction ([address] CHF (congestive heart failure) ([address] Depression, Diabetes mellitus ([address] Glaucoma, Hypertension, Macular degeneration, Thyroid disease, and TIA (transient ischemic attack). Comorbidities reviewed and education provided.     >>For CHF and Comorbidity documentation on Education Time and Topics, please see Education Tab
Problem: Falls - Risk of:  Goal: Will remain free from falls  Description  Will remain free from falls  Outcome: Ongoing  Goal: Absence of physical injury  Description  Absence of physical injury  Outcome: Ongoing     Problem: Risk for Impaired Skin Integrity  Goal: Tissue integrity - skin and mucous membranes  Description  Structural intactness and normal physiological function of skin and  mucous membranes.   Outcome: Ongoing     Problem: Infection:  Goal: Will remain free from infection  Description  Will remain free from infection  Outcome: Ongoing     Problem: Safety:  Goal: Free from accidental physical injury  Description  Free from accidental physical injury  Outcome: Ongoing  Goal: Free from intentional harm  Description  Free from intentional harm  Outcome: Ongoing     Problem: Daily Care:  Goal: Daily care needs are met  Description  Daily care needs are met  Outcome: Ongoing     Problem: Pain:  Goal: Patient's pain/discomfort is manageable  Description  Patient's pain/discomfort is manageable  Outcome: Ongoing  Goal: Pain level will decrease  Description  Pain level will decrease  Outcome: Ongoing  Goal: Control of acute pain  Description  Control of acute pain  Outcome: Ongoing  Goal: Control of chronic pain  Description  Control of chronic pain  Outcome: Ongoing     Problem: Skin Integrity:  Goal: Skin integrity will stabilize  Description  Skin integrity will stabilize  Outcome: Ongoing     Problem: Discharge Planning:  Goal: Patients continuum of care needs are met  Description  Patients continuum of care needs are met  Outcome: Ongoing     Problem: OXYGENATION/RESPIRATORY FUNCTION  Goal: Patient will maintain patent airway  Outcome: Ongoing  Goal: Patient will achieve/maintain normal respiratory rate/effort  Description  Respiratory rate and effort will be within normal limits for the patient  Outcome: Ongoing     Problem: HEMODYNAMIC STATUS  Goal: Patient has stable vital signs and fluid
Problem: Falls - Risk of:  Goal: Will remain free from falls  Description  Will remain free from falls  Outcome: Ongoing  Goal: Absence of physical injury  Description  Absence of physical injury  Outcome: Ongoing     Problem: Risk for Impaired Skin Integrity  Goal: Tissue integrity - skin and mucous membranes  Description  Structural intactness and normal physiological function of skin and  mucous membranes. Outcome: Ongoing     Problem: Infection:  Goal: Will remain free from infection  Description  Will remain free from infection  Outcome: Ongoing     Problem: Safety:  Goal: Free from accidental physical injury  Description  Free from accidental physical injury  Outcome: Ongoing  Goal: Free from intentional harm  Description  Free from intentional harm  Outcome: Ongoing     Problem: Daily Care:  Goal: Daily care needs are met  Description  Daily care needs are met  Outcome: Ongoing     Problem: Pain:  Goal: Patient's pain/discomfort is manageable  Description  Patient's pain/discomfort is manageable  Outcome: Ongoing     Problem: Skin Integrity:  Goal: Skin integrity will stabilize  Description  Skin integrity will stabilize  Outcome: Ongoing     Problem: Discharge Planning:  Goal: Patients continuum of care needs are met  Description  Patients continuum of care needs are met  Outcome: Ongoing     Problem: OXYGENATION/RESPIRATORY FUNCTION  Goal: Patient will maintain patent airway  Outcome: Ongoing  Goal: Patient will achieve/maintain normal respiratory rate/effort  Description  Respiratory rate and effort will be within normal limits for the patient  Outcome: Ongoing  Intervention: ASSESS OXYGENATION AS ORDERED  Note:   Pt SpO2 oxygenation is 95% on 3L via Nasal canula.       Problem: HEMODYNAMIC STATUS  Goal: Patient has stable vital signs and fluid balance  Outcome: Ongoing     Problem: FLUID AND ELECTROLYTE IMBALANCE  Goal: Fluid and electrolyte balance are achieved/maintained  Outcome: Ongoing     Problem:
balance  Outcome: Ongoing     Problem: FLUID AND ELECTROLYTE IMBALANCE  Goal: Fluid and electrolyte balance are achieved/maintained  Outcome: Ongoing     Problem: ACTIVITY INTOLERANCE/IMPAIRED MOBILITY  Goal: Mobility/activity is maintained at optimum level for patient  Outcome: Ongoing

## 2019-06-26 NOTE — FLOWSHEET NOTE
06/26/19 0430   Vital Signs   Temp 97.8 °F (36.6 °C)   Temp Source Oral   Pulse 71   Heart Rate Source Monitor   Resp 16   BP (!) 158/76   BP Location Right lower arm   BP Upper/Lower Lower   Level of Consciousness 0   MEWS Score 1   Oxygen Therapy   SpO2 97 %   Pulse Oximeter Device Mode Intermittent   O2 Flow Rate (L/min) 2 L/min   Rested well, shallow breathing, denies needs, call light in reach, patient goes back to sleep quickly. Will continue to monitor, call light in reach.

## 2019-06-26 NOTE — PROGRESS NOTES
[address]  Cardiology  Progress Note    Admission date:  2019    Reason for follow up visit: dCF    HPI/CC: Elgin Smith is a 80 y.o. female who presented to [facility] with complaints of shortness of breath for 3 days that progressively got worse. She was having some chest heaviness along with the shortness of breath. She has a prior history of CHF, CAD, CABG, DM, HTN, TIA, and breast cancer. Subjective: She stated she is feeling better and would like to go back to the [place] today. Sitting up in chair, edema improved. Denies chest pain, shortness of breath, palpitations and dizziness. Weight down 6 lbs today. Vitals:  Blood pressure (!) 140/65, pulse 86, temperature 97.5 °F (36.4 °C), temperature source Oral, resp. rate 18, height 5' 2\" (1.575 m), weight 128 lb 8 oz (58.3 kg), SpO2 94 %.   Temp  Av.4 °F (36.3 °C)  Min: 97 °F (36.1 °C)  Max: 97.8 °F (36.6 °C)  Pulse  Av.5  Min: 71  Max: 89  BP  Min: 121/71  Max: 158/76  SpO2  Av.5 %  Min: 94 %  Max: 97 %    24 hour I/O    Intake/Output Summary (Last 24 hours) at 2019 1524  Last data filed at 2019 1507  Gross per 24 hour   Intake 850 ml   Output 1125 ml   Net -275 ml     Current Facility-Administered Medications   Medication Dose Route Frequency Provider Last Rate Last Dose    spironolactone (ALDACTONE) tablet 12.5 mg  12.5 mg Oral Daily Shakir Doyle MD   12.5 mg at 19 0280    isosorbide mononitrate (IMDUR) extended release tablet 30 mg  30 mg Oral Daily Shakir Doyle MD   30 mg at 19 0909    nitroGLYCERIN (NITROSTAT) SL tablet 0.4 mg  0.4 mg Sublingual Q5 Min PRN Shakir Doyle MD   0.4 mg at 19 2215    aspirin chewable tablet 81 mg  81 mg Oral Daily Sophie Burk MD   81 mg at 19 0910    atorvastatin (LIPITOR) tablet 80 mg  80 mg Oral Nightly Obed Mancera MD   80 mg at 19 221    levothyroxine (SYNTHROID) tablet 100 mcg  100 mcg Oral Daily Sophie Burk MD   100 mcg at 06/26/19 0620    sodium chloride flush 0.9 % injection 10 mL  10 mL Intravenous 2 times per day Snehal Neil MD   10 mL at 06/26/19 0910    sodium chloride flush 0.9 % injection 10 mL  10 mL Intravenous PRN Snehal Neil MD   10 mL at 06/25/19 2212    ondansetron (ZOFRAN) injection 4 mg  4 mg Intravenous Q6H PRN Snehal Neil MD   4 mg at 06/24/19 1358    enoxaparin (LOVENOX) injection 30 mg  30 mg Subcutaneous Daily Sophie Kaba MD   30 mg at 06/26/19 0910    potassium chloride (KLOR-CON M) extended release tablet 40 mEq  40 mEq Oral PRN Sophie Kaba MD        Or    potassium bicarb-citric acid (EFFER-K) effervescent tablet 40 mEq  40 mEq Oral PRN Sophie Kaba MD        Or    potassium chloride 10 mEq/100 mL IVPB (Peripheral Line)  10 mEq Intravenous PRN Sophie Kaba MD        furosemide (LASIX) injection 40 mg  40 mg Intravenous BID Snehal Neil MD   40 mg at 06/26/19 0910    oxyCODONE-acetaminophen (PERCOCET) 7.5-325 MG per tablet 1 tablet  1 tablet Oral Q4H PRN Snehal Neil MD   1 tablet at 06/26/19 1523    gabapentin (NEURONTIN) capsule 100 mg  100 mg Oral BID Mily Hassan PA-C   100 mg at 06/26/19 0910    insulin glargine (LANTUS) injection vial 6 Units  6 Units Subcutaneous Nightly Mily Hassan PA-C   6 Units at 06/25/19 2232    latanoprost (XALATAN) 0.005 % ophthalmic solution 1 drop  1 drop Both Eyes Nightly Mily Hassan PA-C   1 drop at 06/25/19 2232    citalopram (CELEXA) tablet 20 mg  20 mg Oral Daily Suzanne Hassan PA-C   20 mg at 06/26/19 0910    glucose (GLUTOSE) 40 % oral gel 15 g  15 g Oral PRN Suzanne Hassan PA-C        dextrose 50 % IV solution  12.5 g Intravenous PRN Mily Hassan PA-C        glucagon (rDNA) injection 1 mg  1 mg Intramuscular PRN Suzanne Hassan PA-C        dextrose 5 % solution  100 mL/hr Intravenous PRN Suzanne Hassan PA-C        insulin lispro (HUMALOG) injection vial 0-6 Units  0-6 Units Subcutaneous TID WC Suzanne Martines ANA Hassan   2 Units at 06/26/19 1139    insulin lispro (HUMALOG) injection vial 0-3 Units  0-3 Units Subcutaneous Nightly Richar June ANA Hassan   1 Units at 06/25/19 2232    pantoprazole (PROTONIX) tablet 40 mg  40 mg Oral QAM AC Garth Prado MD   40 mg at 06/26/19 0620    oxazepam (SERAX) capsule 15 mg  15 mg Oral TID PRN Garth Prado MD   15 mg at 06/26/19 0916       Review of Systems   Constitutional: Negative for fatigue. Respiratory: Negative for cough, chest tightness and shortness of breath. Cardiovascular: Negative for chest pain, palpitations and leg swelling. Musculoskeletal: Positive for arthralgias (Left arm/shoulder) and back pain. Neurological: Positive for weakness. Negative for dizziness, syncope and light-headedness. Objective:     Telemetry monitor: SR with PAC's    Physical Exam:  Constitutional:  Comfortable and alert, NAD, appears stated age  Eyes: PERRL, sclera nonicteric  Neck:  Supple, no masses, no thyroidmegaly, no JVD  Skin:  Warm and dry; no rash or lesions  Heart:  Regular, normal apex, S1 and S2 normal, no M/G/R  Lungs:  Normal respiratory effort; faint crackles bibasilar; no wheezing/rhonchi  Abdomen: soft, non tender, + bowel sounds  Extremities:  Trace edema BLE no cyanosis; no clubbing, left shoulder/arm brace  Neuro: alert and oriented, moves legs and arms equally, normal mood and affect      Data Reviewed:  EKG 6/25/2019:  Sinus rhythm with Premature atrial complexesInferior infarct   (cited on or before 04-JAN-2019)  Abnormal ECGWhen compared with ECG of 24-JUN-2019 00:29  No significant change was foundConfirmed by Linda Sue MD, TIFFANIE ([id number]) on 6/25/2019 9:03:33 PM     Chest xray 6/23/19      FINDINGS:   Pulmonary edema is identified with bibasilar airspace disease. Left pleural   effusion is again noted.   The heart size is at the upper limits of normal.   Sternal wires and mediastinal PT/ INR No results found for: INR, PROTIME  PTT No results found for: PTT   Lab Results   Component Value Date    MG 2.00 06/26/2019    No results found for: TSH    All labs and imaging reviewed today    Assessment/Plan:  Acute on Chronic dCHF compensated    -Weights 134-->134-->128   -Pt incontinent at times hard to get accurate I/O   -Start back on home dose of torsemide 40mg daily   -continue Aldactone 12.5 mg, will need BMP in 1 week    -TSH normal   -continue daily weight, low sodium diet, 2000 ml fluid restriction  CAD: stable   -denies chest pain   -continue Imdur   -continue Lipitor and aspirin   HTN: stable  Hx TIA  DM: A1C- 7.5 (4/19)  Dementia       ERICKA Johnson-CNP  [facility]  [phone]

## 2019-06-26 NOTE — DISCHARGE SUMMARY
Name:  Bert Em  Room:  /3184-25  MRN:    [id number]    Discharge Summary      This discharge summary is in conjunction with a complete physical exam done on the day of discharge. Discharging Physician: Dr. Na Lacey: 6/23/2019  Discharge:  6/26/19    HPI taken from admission H&P:     The patient is a 80 y.o. female who presents to [facility]) with acute onset and progressive course of sob. Sob started 3 days ago sob worse with exertion no relieving factors sob associated with cough no fever chills cp leg swelling hx of similar episode in past     Diagnoses this Admission and Hospital Course     #Acute diastolic CHF  - home dose of demadex is 40 mg daily  - IV lasix 40 mg BID started- good diuresis  - aldactone added  - seen by cardio. Dc on demadex and aldactone     #Elevated troponin   - flat elevation, no ischemic EKG changes, unreliable historian. No prior elevation. C/s cardio. No new recommendations, other than addition of imdur      #Acute hypoxic respiratory failure   - Short of breath requiring 3 L of O2  - related to CHF, weaned to 2L at discharge      #CKD3  - stable      #CAD  - per EMR  - cont home ASA and statin      #DM2  - resume home regimen      #LUE fracture   - in splint   - seen by PT OT     #Dementia  - pleasantly confused- seems to be at baseline  - supportive care    Procedures (Please Review Full Report for Details)  None     Consults    Cardio       Physical Exam at Discharge:    BP (!) 140/65   Pulse 86   Temp 97.5 °F (36.4 °C) (Oral)   Resp 18   Ht 5' 2\" (1.575 m)   Wt 128 lb 8 oz (58.3 kg)   SpO2 94%   BMI 23.50 kg/m²     Gen: No distress. Alert. Eyes: PERRL. No sclera icterus. No conjunctival injection. ENT: No discharge. Pharynx clear. Neck: No JVD. Trachea midline. Resp: No accessory muscle use. + minimal crackles. No wheezes. No rhonchi. CV: Regular rate. Regular rhythm. No murmur. No rub. + trace edema. GI: Non-tender. Non-distended. Normal bowel sounds. Skin: Warm and dry. No nodule on exposed extremities. No rash on exposed extremities. M/S: No cyanosis. No joint deformity. No clubbing. LUE in splint   Neuro: Awake. Grossly nonfocal    Psych: Oriented x1 . No anxiety or agitation. CBC:   Recent Labs     06/23/19  2300 06/25/19  0506   WBC 14.6* 10.1   HGB 11.4* 11.2*   HCT 34.3* 33.3*   MCV 95.4 93.4    331     BMP:   Recent Labs     06/24/19  0339 06/25/19  0506 06/26/19  0459   * 132* 132*   K 3.7 4.1 4.1   CL 94* 92* 88*   CO2 28 30 33*   BUN 20 21* 20   CREATININE 1.1 1.2 1.1     LIVER PROFILE:   Recent Labs     06/23/19  2300   AST 13*   ALT 10   BILITOT 0.5   ALKPHOS 92     CULTURES  Blood: NGTD    RADIOLOGY  XR CHEST STANDARD (2 VW)   Final Result   Findings suggest congestive heart failure. Discharge Medications     Medication List      START taking these medications    isosorbide mononitrate 30 MG extended release tablet  Commonly known as:  IMDUR  Take 1 tablet by mouth daily  Start taking on:  6/27/2019  Notes to patient:  Isosorbide Mononitrate (Imdur®)  Use: treat heart failure, prevent and treat chest pain. Side effects: headache, flushing, and dizziness. spironolactone 25 MG tablet  Commonly known as:  ALDACTONE  Take 0.5 tablets by mouth daily  Start taking on:  6/27/2019  Notes to patient:  DIURETICS   Use: treat heart failure, fluid retention, lower blood pressure. Side effects: frequent urination, weakness, muscle cramps,   increased sensitivity to light, nausea, and dizziness. CHANGE how you take these medications    aspirin 81 MG chewable tablet  What changed:  Another medication with the same name was removed. Continue taking this medication, and follow the directions you see here. torsemide 20 MG tablet  Commonly known as:  DEMADEX  Take 2 tablets by mouth daily  What changed:    · how much to take  · Another medication with the same name was removed. Continue taking this medication, and follow the directions you see here. CONTINUE taking these medications    atorvastatin 80 MG tablet  Commonly known as:  LIPITOR     BASAGLAR KWIKPEN 100 UNIT/ML injection pen  Generic drug:  insulin glargine     bimatoprost 0.01 % Soln ophthalmic drops  Commonly known as:  LUMIGAN     BREO ELLIPTA 100-25 MCG/INH Aepb inhaler  Generic drug:  fluticasone-vilanterol     citalopram 20 MG tablet  Commonly known as:  CELEXA     CLARINEX 5 MG tablet  Generic drug:  desloratadine     Docusate Sodium 100 MG Tabs     DULERA 100-5 MCG/ACT inhaler  Generic drug:  mometasone-formoterol     ferrous sulfate 325 (65 Fe) MG tablet     gabapentin 100 MG capsule  Commonly known as:  NEURONTIN     glucose 40 % Gel  Commonly known as:  GLUTOSE     guaiFENesin 400 MG tablet     HUMALOG 100 UNIT/ML injection vial  Generic drug:  insulin lispro     Latanoprost-Timolol Maleate 0.005-0.5 % Soln     lidocaine 5 %  Commonly known as:  LIDODERM  Place 2 patches onto the skin daily 12 hours on, 12 hours off. LORATADINE ALLERGY RELIEF PO     nitroGLYCERIN 0.4 MG SL tablet  Commonly known as:  NITROSTAT     omeprazole 20 MG delayed release capsule  Commonly known as:  PRILOSEC     oxazepam 15 MG capsule  Commonly known as:  SERAX  Take 1 capsule by mouth 3 times daily as needed for Sleep or Anxiety for up to 2 days. oxyCODONE-acetaminophen 7.5-325 MG per tablet  Commonly known as:  PERCOCET  Take 1 tablet by mouth every 4 hours as needed for Pain for up to 2 days.      POTASSIUM CHLORIDE ER PO     ROCALTROL 0.25 MCG capsule  Generic drug:  calcitRIOL     SYNTHROID 100 MCG tablet  Generic drug:  levothyroxine     traZODone 50 MG tablet  Commonly known as:  DESYREL     TRUSOPT 2 % ophthalmic solution  Generic drug:  dorzolamide           Where to Get Your Medications      You can get these medications from any pharmacy    Bring a paper prescription for each of these medications  · oxazepam 15 MG capsule  · oxyCODONE-acetaminophen 7.5-325 MG per tablet     Information about where to get these medications is not yet available    Ask your nurse or doctor about these medications  · isosorbide mononitrate 30 MG extended release tablet  · spironolactone 25 MG tablet  · torsemide 20 MG tablet           Discharged in stable condition to LTC    Follow Up: Follow up with PCP in 1 week     Total time spent on discharge is 35 minutes    INDRA Matthews.

## 2019-06-26 NOTE — PROGRESS NOTES
Patient's EF (Ejection Fraction) is greater than 40%    Patient's weights and intake/output reviewed:    Patient's Last Weight: 134 lbs obtained by bed scale. Today's weight is noted to be 134 more than last documented weight. Intake/Output Summary (Last 24 hours) at 6/26/2019 0555  Last data filed at 6/25/2019 2200  Gross per 24 hour   Intake 670 ml   Output 200 ml   Net 470 ml       Patient's current functional capacity:  Slight limitation of physical activity. Comfortable at rest. Ordinary physical activity results in fatigue, palpitation, dyspnea. Pt up in chair at this time on 2 L O2. Pt denies shortness of breath. Pt with nonpitting lower extremity edema. Patient and family's stated goal of care: reduce shortness of breath, increase activity tolerance, better understand heart failure and disease management, be more comfortable and reduce lower extremity edema prior to discharge        Patient has a past medical history of Anxiety, CAD (coronary artery disease), Cancer ([address] Cerebral artery occlusion with cerebral infarction ([address] CHF (congestive heart failure) ([address] Depression, Diabetes mellitus ([address] Glaucoma, Hypertension, Macular degeneration, Thyroid disease, and TIA (transient ischemic attack). Comorbidities reviewed and education provided.     >>For CHF and Comorbidity documentation on Education Time and Topics, please see Education Tab

## 2019-06-26 NOTE — PROGRESS NOTES
EMS here to take patient to [facility].  Patient is stable condition on 2 L NC.

## 2019-06-26 NOTE — PROGRESS NOTES
Discharge instructions reviewed with the [facility] RN. Verbalized understanding of all including prescribed medication, medication side effects/restrictions, and follow up care. Denies questions/concerns. Patient is alert/oriented, stable, and ambulatory at the time of discharge on 2 L NC. Awaiting EMS transport. Daughter is at bedside.

## 2019-06-26 NOTE — CARE COORDINATION
DISCHARGE ORDER  Date/Time 2019 1:57 PM  Completed by: Sweetie Bowser, Case Management    Patient Name: Linda Sylvester    : 1928  Admitting Diagnosis: CHF (congestive heart failure), NYHA class III, acute on chronic, combined ([facility] [I50.43]  Admit Date/Time: 2019 10:44 PM    Noted discharge order. Confirmed discharge plan with patient / family ([facility]): Yes   Discharge Plan: Order for dc noted. Spoke with pt who cont plan to return to [place] [address]. Spoke with [place] at [address] who states can accept today for LTC. Chart reviewed and no other dc needs identified. Discharge orders and Continuity of Care faxed to facility: Yes  Hospital Exemption Notification System complete: No-LTC  Transportation arranged: Yes - [facility]  Pick-up @ 15:45  Patient / Family (varun Wang) aware of  time: Yes   Nursing aware of  time: Yes  Receiving facility aware of  time: Yes  Pre-cert obtained?   N/A

## 2019-06-26 NOTE — DISCHARGE INSTR - COC
Continuity of Care Form    Patient Name: Lexi Mccarty   :  1928  MRN:  [id number]    Admit date:  2019  Discharge date:  19    Code Status Order: DNR-CCA   Advance Directives:     Admitting Physician:  Ariella Nobles MD  PCP: [address]    Discharging Nurse: [facility]/Room#: /2221-04  Discharging Unit Phone Number: [phone]    Emergency Contact:   Extended Emergency Contact Information  Primary Emergency Contact: Bobo Phone: [phone]  Relation: Child  Secondary Emergency Contact: Georgina Herbert Phone: [phone]  Mobile Phone: [phone]  Relation: Child    Past Surgical History:  Past Surgical History:   Procedure Laterality Date    CARDIAC SURGERY      CHOLECYSTECTOMY      CORONARY ARTERY BYPASS GRAFT      2 way     HYSTERECTOMY      SPINE SURGERY      THYROIDECTOMY         Immunization History:   Immunization History   Administered Date(s) Administered    Influenza Virus Vaccine 2009, 2010, 2011, 2012, 2013, 2014, 2015    Td, unspecified formulation 2015       Active Problems:  Patient Active Problem List   Diagnosis Code    Acute on chronic diastolic congestive heart failure (HCC) I50.33    Pleural effusion J90    Coronary artery disease involving native coronary artery of native heart without angina pectoris I25.10    Non-rheumatic mitral regurgitation I34.0    Bilateral carotid artery stenosis I65.23    Mixed hyperlipidemia E78.2    Acute diastolic congestive heart failure ([address] I50.31    Diabetes mellitus ([address] E11.9    Essential hypertension I10    Closed fracture of left humerus S42.302A    CHF (congestive heart failure), NYHA class III, acute on chronic, combined ([address] I50.43    Acute respiratory failure with hypoxia ([address] J96.01       Isolation/Infection:   Isolation          No Isolation            Nurse Assessment:  Last Vital Signs: BP (!) 140/65   Pulse 86   Temp 97.5 °F (36.4 °C) (Oral)   Resp 18   Ht 5' 2\" (1.575 m)   Wt 128 lb 8 oz (58.3 kg)   SpO2 94%   BMI 23.50 kg/m²     Last documented pain score (0-10 scale): Pain Level: 6  Last Weight:   Wt Readings from Last 1 Encounters:   06/26/19 128 lb 8 oz (58.3 kg)     Mental Status:  oriented, alert, coherent, logical, thought processes intact and able to concentrate and follow conversation    IV Access:  - None    Nursing Mobility/ADLs:  Walking   Assisted  Transfer  Assisted  Bathing  Assisted  Dressing  Assisted  Toileting  Assisted  Feeding  [facility] Delivery   whole    Wound Care Documentation and Therapy:        Elimination:  Continence:   · Bowel: Yes  · Bladder: Yes  Urinary Catheter: None   Colostomy/Ileostomy/Ileal Conduit: No       Date of Last BM: 06/24/19    Intake/Output Summary (Last 24 hours) at 6/26/2019 1116  Last data filed at 6/26/2019 1011  Gross per 24 hour   Intake 910 ml   Output 1125 ml   Net -215 ml     I/O last 3 completed shifts: In: 65 [P.O.:840; I.V.:10]  Out: 1125 [Urine:1125]    Safety Concerns: At Risk for Falls    Impairments/Disabilities:      Vision and Hearing    Nutrition Therapy:  Current Nutrition Therapy:   - Oral Diet:  Carb Control 4 carbs/meal (1800kcals/day) and Low Sodium (2gm)    Routes of Feeding: Oral  Liquids: Thin Liquids  Daily Fluid Restriction: yes - amount 2000 mL  Last Modified Barium Swallow with Video (Video Swallowing Test): not done    Treatments at the Time of Hospital Discharge:   Respiratory Treatments: n/a  Oxygen Therapy:  is on oxygen at 2 L/min per nasal cannula.   Ventilator:    - No ventilator support    Rehab Therapies: Physical Therapy and Occupational Therapy  Weight Bearing Status/Restrictions: Non-weight bearing on left arm  Other Medical Equipment (for information only, NOT a DME order):  cane, bedside commode and braces to LUE  Other Treatments: n/a    Patient's personal belongings (please select all that are sent with patient):  Glasses, Dentures upper and lower, clothing and undergarments    RN SIGNATURE:  Electronically signed by Elsy Manzanares RN on 6/26/19 at 11:31 AM    CASE MANAGEMENT/SOCIAL WORK SECTION    Inpatient Status Date: 6/24/19    Readmission Risk Assessment Score:  Readmission Risk              Risk of Unplanned Readmission:        23           Discharging to Facility/ Agency   · Name: [facility]  · Phone: [phone]  · Fax: [phone]      / signature: Electronically signed by Julian Carr RN on 6/26/19 at 1:40 PM    PHYSICIAN SECTION    Prognosis: Fair    Condition at Discharge: Stable    Rehab Potential (if transferring to Rehab): Fair    Recommended Labs or Other Treatments After Discharge: none    Physician Certification: I certify the above information and transfer of Laurie Velasquez  is necessary for the continuing treatment of the diagnosis listed and that she requires [place] for less 30 days.      Update Admission H&P: No change in H&P    PHYSICIAN SIGNATURE:  Electronically signed by Chad Das MD on 6/26/19 at 11:17 AM

## 2019-06-29 LAB
BLOOD CULTURE, ROUTINE: NORMAL
CULTURE, BLOOD 2: NORMAL

## 2021-01-08 NOTE — ED NOTES
Hypertension (running high at night with SOA)    Subjective   Esperanza Pop is a 73 y.o. female is here today for follow-up.    History of Present Illness   Notes increased soa when she goes to bed, wakes her up from sleep.  BP is staying up.  Was over 200 when she went to dialysis yesterday.  She did have a lot more pedal edema, and they took extra fluid out of her.      Current Outpatient Medications:   •  amiodarone (PACERONE) 100 MG tablet, Take 1 tablet by mouth Daily., Disp: 90 tablet, Rfl: 3  •  ammonium lactate (LAC-HYDRIN) 12 % lotion, Apply  topically to the appropriate area as directed As Needed for Dry Skin. (Patient taking differently: Apply 1 application topically to the appropriate area as directed 2 (Two) Times a Day As Needed for Dry Skin.), Disp: 500 g, Rfl: 3  •  aspirin 81 MG EC tablet, Take 1 tablet by mouth Daily. (Patient taking differently: Take 81 mg by mouth Every Other Day.), Disp: , Rfl:   •  atorvastatin (LIPITOR) 80 MG tablet, Take 1 tablet by mouth Every Night. (Patient taking differently: Take 80 mg by mouth Daily.), Disp: 90 tablet, Rfl: 1  •  B Complex-C-Folic Acid (DIALYVITE 800 PO), Take 1 tablet by mouth Daily., Disp: , Rfl:   •  carvedilol (COREG) 25 MG tablet, Take 1 tablet by mouth Every 12 (Twelve) Hours. (Patient taking differently: Take 12.5 mg by mouth Every 12 (Twelve) Hours.), Disp: 60 tablet, Rfl: 5  •  cloNIDine (CATAPRES) 0.2 MG tablet, Take 1 tablet by mouth 3 (Three) Times a Day., Disp: 270 tablet, Rfl: 3  •  clopidogrel (PLAVIX) 75 MG tablet, Take 1 tablet by mouth Daily., Disp: 90 tablet, Rfl: 3  •  desonide (DESOWEN) 0.05 % cream, Apply 0.05 application topically to the appropriate area as directed 2 (Two) Times a Day., Disp: , Rfl: 1  •  dorzolamide-timolol (COSOPT) 22.3-6.8 MG/ML ophthalmic solution, Administer 1 drop to both eyes Daily., Disp: 10 mL, Rfl: 3  •  famotidine (PEPCID) 20 MG tablet, Take 1 tablet by mouth 2 (Two) Times a Day., Disp: 180 tablet, Rfl:  Writer with lab draw attempt X 2. Lab called to come draw second set of cultures.       Eugenio Vincent RN  06/24/19 3504 1  •  Ferrous Sulfate (IRON) 325 (65 Fe) MG tablet, Take 1 tablet by mouth 2 (Two) Times a Day., Disp: , Rfl: 5  •  fluocinonide (LIDEX) 0.05 % external solution, Apply 0.05 application topically to the appropriate area as directed 2 (Two) Times a Day. Scalp, Disp: , Rfl: 2  •  insulin NPH-insulin regular (humuLIN 70/30,novoLIN 70/30) (70-30) 100 UNIT/ML injection, Inject 20 units before dinner and 17 Units before breakfast., Disp: 10 mL, Rfl: 5  •  IRON DEXTRAN IJ, Inject  as directed 3 (Three) Times a Week., Disp: , Rfl:   •  isosorbide mononitrate (IMDUR) 120 MG 24 hr tablet, Take 1 tablet by mouth Daily., Disp: 90 tablet, Rfl: 3  •  latanoprost (XALATAN) 0.005 % ophthalmic solution, Administer 1 drop to both eyes Every Night., Disp: , Rfl:   •  lidocaine-prilocaine (EMLA) 2.5-2.5 % cream, Apply 1 application topically to the appropriate area as directed As Needed (dialysis access)., Disp: , Rfl: 6  •  nitroglycerin (Nitrolingual) 0.4 MG/SPRAY spray, Place 1 spray under the tongue Every 5 (Five) Minutes As Needed for Chest Pain., Disp: 1 each, Rfl: 12  •  torsemide (DEMADEX) 100 MG tablet, Take 100 mg by mouth Daily As Needed (weight gain 4+lb)., Disp: , Rfl:   •  Ferric Citrate 1  MG(Fe) tablet, Take 1 tablet by mouth As Needed., Disp: , Rfl:   •  insulin NPH-insulin regular (humuLIN 70/30,novoLIN 70/30) (70-30) 100 UNIT/ML injection, Inject 20 Units under the skin into the appropriate area as directed Every Night., Disp: , Rfl:   •  rOPINIRole (Requip) 0.5 MG tablet, 1/2 PO HS x 1 week then 1 PO HS. Take 1 hour before bedtime., Disp: 30 tablet, Rfl: 5  •  terazosin (HYTRIN) 2 MG capsule, Take 2 capsules by mouth every night at bedtime., Disp: 180 capsule, Rfl: 1      The following portions of the patient's history were reviewed and updated as appropriate: allergies, current medications, past family history, past medical history, past social history, past surgical history and problem list.    Review of  Systems   Constitutional: Negative.  Negative for chills and fever.   HENT: Negative for ear discharge, ear pain, sinus pressure and sore throat.    Respiratory: Positive for shortness of breath. Negative for cough and chest tightness.         Orthopnea   Cardiovascular: Positive for leg swelling. Negative for chest pain and palpitations.   Gastrointestinal: Negative for diarrhea, nausea and vomiting.   Musculoskeletal: Negative for arthralgias, back pain and myalgias.   Neurological: Negative for dizziness, syncope and headaches.   Psychiatric/Behavioral: Negative for confusion and sleep disturbance.       Objective   BP (!) 202/102   Pulse 75   LMP  (LMP Unknown)   Physical Exam  Pulmonary:      Effort: Pulmonary effort is normal. No respiratory distress.   Neurological:      Mental Status: She is alert and oriented to person, place, and time.      Comments: Speech wnl   Psychiatric:         Judgment: Judgment normal.           Results for orders placed or performed during the hospital encounter of 12/08/20   COVID-19 PCR, [facility], NP SWAB IN [facility] VIRAL TRANSPORT MEDIA 24-30 HR TAT - Swab, Nasopharynx    Specimen: Nasopharynx; Swab   Result Value Ref Range    SARS-CoV-2 CHARISSA Not Detected Not Detected             Assessment/Plan   Diagnoses and all orders for this visit:    Acute on chronic diastolic heart failure (CMS/HCC)  Comments:  start torsemide daily, monitor sxs, and d/w HD, to take more fluid off if possible.    Essential hypertension  Comments:  increase terazosin to 0.4mg hs, continue c;lonidine, and add torsemide daily x 5 days.  d/w HD  Orders:  -     terazosin (HYTRIN) 2 MG capsule; Take 2 capsules by mouth every night at bedtime.    Other orders  -     Discontinue: terazosin (HYTRIN) 2 MG capsule; Take 2 mg by mouth every night at bedtime.    This visit has been rescheduled as a phone visit to comply with patient safety concerns in accordance with CDC recommendations. Total time of discussion  was 23 minutes.               Return in about 1 month (around 2/8/2021) for Recheck.